# Patient Record
Sex: MALE | Race: WHITE | NOT HISPANIC OR LATINO | Employment: FULL TIME | URBAN - METROPOLITAN AREA
[De-identification: names, ages, dates, MRNs, and addresses within clinical notes are randomized per-mention and may not be internally consistent; named-entity substitution may affect disease eponyms.]

---

## 2017-05-05 ENCOUNTER — ALLSCRIPTS OFFICE VISIT (OUTPATIENT)
Dept: OTHER | Facility: OTHER | Age: 47
End: 2017-05-05

## 2017-12-19 ENCOUNTER — ALLSCRIPTS OFFICE VISIT (OUTPATIENT)
Dept: OTHER | Facility: OTHER | Age: 47
End: 2017-12-19

## 2017-12-20 NOTE — PROGRESS NOTES
Assessment  1  Umbilical hernia without obstruction and without gangrene (553 1) (G06 6)    Plan  Umbilical hernia without obstruction and without gangrene    · 1 - Ian Prado MD, Joyce Erazo  (General Surgery) Co-Management  *  Status: Active  Requestedfor: 69XOA7958  Care Summary provided  : Yes    Discussion/Summary    Discussed with patient that if he develops any pain or redness in the area he has to go the emergency room  The patient was counseled regarding diagnostic results,-- instructions for management,-- risk factor reductions,-- prognosis  Chief Complaint  Patient is here with c/o possible hernia      History of Present Illness  HPI: Patient states today concerned that he may have developed a hernia, he has noticed swelling around his umbilicus when he coughs  He denies any fevers chills, or pain in the area  Review of Systems   Constitutional: no fever or chills, feels well, no tiredness, no recent weight loss or weight gain  ENT: no complaints of earache, no loss of hearing, no nosebleeds or nasal discharge, no sore throat or hoarseness  Cardiovascular: no complaints of slow or fast heart rate, no chest pain, no palpitations, no leg claudication or lower extremity edema  Respiratory: no complaints of shortness of breath, no wheezing or cough, no dyspnea on exertion, no orthopnea or PND  Gastrointestinal: as noted in HPI  Genitourinary: no complaints of dysuria or incontinence, no hesitancy, no nocturia, no genital lesion, no inadequacy of penile erection  Musculoskeletal: no complaints of arthralgia, no myalgia, no joint swelling or stiffness, no limb pain or swelling  Active Problems  1  Abdominal pain, LLQ (left lower quadrant) (789 04) (R10 32)   2  Anxiety (300 00) (F41 9)   3  Back pain (724 5) (M54 9)   4  CAD in native artery (414 01) (I25 10)   5  Chest pain (786 50) (R07 9)   6  Conjunctivitis (372 30) (H10 9)   7  Dyspnea (786 09) (R06 00)   8  Fatigue (780 79) (R53 83)   9  Hyperlipemia (272 4) (E78 5)   10  Hypertension, benign (401 1) (I10)   11  Neuropathy of both upper extremities (356 9) (G56 93)   12  Palpitations (785 1) (R00 2)   13  RLS (restless legs syndrome) (333 94) (G25 81)   14  URI (upper respiratory infection) (465 9) (J06 9)    Past Medical History  1  History of Anxiety disorder (300 00) (F41 9)   2  History of Depression (311) (F32 9)   3  History of Hyperlipidemia (272 4) (E78 5)   4  History of Hypertension (401 9) (I10)   5  History of Obstructive sleep apnea (327 23) (G47 33)    Family History  Mother    1  Family history of coronary artery disease (V17 3) (Z82 49)   2  Family history of Hypertension (401 9) (I10)  Father    3  Family history of coronary artery disease (V17 3) (Z82 49)   4  Family history of Hypertension (401 9) (I10)  Brother    5  Family history of coronary artery disease (V17 3) (Z82 49)   6  Family history of Hypertension (401 9) (I10)    Social History   · Never smoker    Surgical History  1  History of Appendectomy   2  History of Knee Surgery    Current Meds   1  AmLODIPine Besylate 5 MG Oral Tablet; take 1 tablet by mouth once daily; Therapy: 62UIL5930 to (Evaluate:07Apr2018)  Requested for: 93GOW1984; Last Rx:59Ioe3698 Ordered   2  Aspirin 81 MG TABS; TAKE 1 TABLET DAILY; Therapy: 29SQR3473 to (Evaluate:19Mar2015); Last Rx:27Xfe1709 Ordered   3  Atorvastatin Calcium 40 MG Oral Tablet; take 1 tablet by mouth once daily; Therapy: 49DXE8563 to (Evaluate:07Apr2018)  Requested for: 09Oct2017; Last Rx:09Oct2017 Ordered   4  Azithromycin 250 MG Oral Tablet; TAKE 2 TABLETS ON DAY 1 THEN TAKE 1 TABLET A DAY FOR 4 DAYS; Therapy: 56FMH6357 to (Last Rx:65Smi7389)  Requested for: 40Zqf4305 Ordered   5  Ciprofloxacin HCl - 500 MG Oral Tablet; 1 bd; Therapy: 47PMQ3368 to (Last FA:08MJZ4674)  Requested for: 05ATS1681 Ordered   6  Metaxalone 400 MG Oral Tablet; TAKE 1 TABLET 3 TIMES DAILY as needed for back pain;  Therapy: 28XSD1491 to (Last Rx: 90ETK5045)  Requested for: 29AVO1252 Ordered   7  MetroNIDAZOLE 500 MG Oral Tablet; 1 tid; Therapy: 90TFL4234 to (Last YJ:54KCL7169)  Requested for: 19VKJ3451 Ordered   8  Venlafaxine HCl ER 75 MG Oral Capsule Extended Release 24 Hour; take 1 capsule by mouth daily; Therapy: 74AZD8847 to (Evaluate:02Jun2018)  Requested for: 20JQT1329; Last Rx:67Jtx6348 Ordered    Allergies  1  No Known Drug Allergies    Vitals   Recorded: 64JHC4754 11:55AM   Heart Rate 195   Systolic 616   Diastolic 78   Height 5 ft 8 in   Weight 228 lb    BMI Calculated 34 67   BSA Calculated 2 16   O2 Saturation 96     Physical Exam   Constitutional  General appearance: No acute distress, well appearing and well nourished  Ears, Nose, Mouth, and Throat  Oropharynx: Normal with no erythema, edema, exudate or lesions  Pulmonary  Auscultation of lungs: Clear to auscultation, equal breath sounds bilaterally, no wheezes, no rales, no rhonci  Cardiovascular  Auscultation of heart: Normal rate and rhythm, normal S1 and S2, without murmurs  Examination of extremities for edema and/or varicosities: Normal    Abdomen  Abdomen: Abnormal  -- reducible umbilical hernia          Signatures   Electronically signed by : Olayinka Freedman DO; Dec 19 2017 12:15PM EST                       (Author)

## 2017-12-26 ENCOUNTER — ALLSCRIPTS OFFICE VISIT (OUTPATIENT)
Dept: OTHER | Facility: OTHER | Age: 47
End: 2017-12-26

## 2017-12-26 DIAGNOSIS — K42.0 UMBILICAL HERNIA WITH OBSTRUCTION, WITHOUT GANGRENE: ICD-10-CM

## 2017-12-27 NOTE — CONSULTS
Assessment   1  Incarcerated umbilical hernia (881 8) (K42 0)    Plan   Incarcerated umbilical hernia    · Schedule Surgery Treatment  Procedure  Status: Hold For - Scheduling  Requested for:    17SSO4285   Ordered; For: Incarcerated umbilical hernia; Ordered By: Tyson De Jesus Performed:  Due: 59KWW7234   · (1) [place]; Status:Active; Requested for:60Ixq9125;    Perform:[facility]; Due:25Sbt6514; Ordered; For:Incarcerated umbilical hernia; Ordered By:Lebron Kumar;   · (1) CBC/ PLT (NO DIFF); Status:Active; Requested for:69Fws6192;    Perform:[facility]; Due:40Qmv1635; Ordered; For:Incarcerated umbilical hernia; Ordered By:Lebron Kumar;    Discussion/Summary   Discussion Summary:    38-year-old male with a past medical history significant for hypertension, hyperlipidemia, morbid obesity, who has an incarcerated umbilical hernia  I advised the patient to undergo laparoscopic umbilical hernia repair with mesh in the near future  I discussed the operative procedure, risks, benefits and alternatives with the patient, he understood and agreed to proceed  Medication SE Review and Pt Understands Tx: The treatment plan was reviewed with the patient/guardian  The patient/guardian understands and agrees with the treatment plan      Chief Complaint   Chief Complaint Free Text Note Form: Consult umbilical hernia      History of Present Illness   HPI: I had the pleasure of seeing Alok Morris in the office today accompanied by his wife for evaluation of umbilical hernia  The patient is a pleasant 38-year-old male started complaining of moderate pain from the bulging umbilicus the last couple weeks because of coughing spell secondary to a bad cold  He also noted that the bulge has been increasing in size  He noted the bulge for many years  He did not seek any medical attention until now  Review of Systems   Complete-Male:      Constitutional: no fever-- and-- no chills  Eyes: no eye pain-- and-- no purulent discharge from the eyes  ENT: no earache,-- no nosebleeds-- and-- no sore throat  Cardiovascular: no chest pain-- and-- no palpitations  Respiratory: no shortness of breath,-- no cough-- and-- no wheezing  Gastrointestinal: no abdominal pain,-- no nausea,-- no vomiting,-- no constipation-- and-- no diarrhea  Genitourinary: no dysuria-- and-- no incontinence  Musculoskeletal: no arthralgias-- and-- no myalgias  Integumentary: no rashes-- and-- no skin lesions  Neurological: no headache-- and-- no convulsions  Psychiatric: no anxiety-- and-- no depression  Hematologic/Lymphatic: no swollen glands-- and-- no tendency for easy bleeding  ROS Reviewed:    ROS reviewed  Active Problems   1  Abdominal pain, LLQ (left lower quadrant) (789 04) (R10 32)   2  Anxiety (300 00) (F41 9)   3  Back pain (724 5) (M54 9)   4  CAD in native artery (414 01) (I25 10)   5  Chest pain (786 50) (R07 9)   6  Conjunctivitis (372 30) (H10 9)   7  Dyspnea (786 09) (R06 00)   8  Fatigue (780 79) (R53 83)   9  Hyperlipemia (272 4) (E78 5)   10  Hypertension, benign (401 1) (I10)   11  Neuropathy of both upper extremities (356 9) (G56 93)   12  Palpitations (785 1) (R00 2)   13  RLS (restless legs syndrome) (333 94) (G25 81)   14  Umbilical hernia without obstruction and without gangrene (553 1) (K42 9)   15  URI (upper respiratory infection) (465 9) (J06 9)    Past Medical History   1  History of Anxiety disorder (300 00) (F41 9)   2  History of Depression (311) (F32 9)   3  History of Hyperlipidemia (272 4) (E78 5)   4  History of Hypertension (401 9) (I10)   5  History of Obstructive sleep apnea (327 23) (G47 33)  Active Problems And Past Medical History Reviewed: The active problems and past medical history were reviewed and updated today  Surgical History   1  History of Appendectomy   2   History of Knee Surgery  Surgical History Reviewed: The surgical history was reviewed and updated today  Family History   Mother    1  Family history of coronary artery disease (V17 3) (Z82 49)   2  Family history of Hypertension (401 9) (I10)  Father    3  Family history of coronary artery disease (V17 3) (Z82 49)   4  Family history of Hypertension (401 9) (I10)  Brother    5  Family history of coronary artery disease (V17 3) (Z82 49)   6  Family history of Hypertension (401 9) (I10)  Family History Reviewed: The family history was reviewed and updated today  Social History    · Never smoker  Social History Reviewed: The social history was reviewed and updated today  The social history was reviewed and is unchanged  Current Meds    1  AmLODIPine Besylate 5 MG Oral Tablet; take 1 tablet by mouth once daily; Therapy: 57SZS9865 to (Evaluate:07Apr2018)  Requested for: 79QXY1598; Last     Rx:52Xkl3514 Ordered   2  Aspirin 81 MG TABS; TAKE 1 TABLET DAILY; Therapy: 74TIL8661 to (Evaluate:19Mar2015); Last Rx:22Pjw2767 Ordered   3  Atorvastatin Calcium 40 MG Oral Tablet; take 1 tablet by mouth once daily; Therapy: 78BNY5125 to (Evaluate:07Apr2018)  Requested for: 09Oct2017; Last     Rx:73Wxw2481 Ordered   4  Azithromycin 250 MG Oral Tablet; TAKE 2 TABLETS ON DAY 1 THEN TAKE 1 TABLET A     DAY FOR 4 DAYS; Therapy: 75ALU3164 to (Last Rx:64Ynj6968)  Requested for: 82Jos7425 Ordered   5  Ciprofloxacin HCl - 500 MG Oral Tablet; 1 bd; Therapy: 12BFM9364 to (Last AE:56LKM5318)  Requested for: 03MQO2798 Ordered   6  Metaxalone 400 MG Oral Tablet; TAKE 1 TABLET 3 TIMES DAILY as needed for back pain; Therapy: 40UNS5953 to (Last Rx:42Hxb7777)  Requested for: 64Cod7937 Ordered   7  MetroNIDAZOLE 500 MG Oral Tablet; 1 tid; Therapy: 32RUI9020 to (Last LJ:43THH6602)  Requested for: 02UCW6609 Ordered   8  Venlafaxine HCl ER 75 MG Oral Capsule Extended Release 24 Hour; take 1 capsule by     mouth daily;      Therapy: 26SHE3241 to (ULKHVURO:99DBA9288)  Requested for: 09OJU1984; Last     Rx:53Rgn7668 Ordered  Medication List Reviewed: The medication list was reviewed and updated today  Allergies   1  No Known Drug Allergies    Vitals   Vital Signs    Recorded: 82RWV8044 11:28AM   Temperature 11 7 F, Oral   Systolic 635, RUE, Sitting   Diastolic 96, RUE, Sitting   Height 5 ft 8 in   Weight 228 lb 2 oz   BMI Calculated 34 69   BSA Calculated 2 16     Physical Exam        Constitutional      General appearance: No acute distress, well appearing and well nourished  Eyes      Conjunctiva and lids: No swelling, erythema, or discharge  Pupils and irises: Equal, round and reactive to light  Sclera non-icteric  Ears, Nose, Mouth, and Throat      External inspection of ears and nose: Normal        Oropharynx: Normal with no erythema, edema, exudate or lesions  Neck      Supple, symmetric, trachea midline, no masses      Pulmonary      Respiratory effort: No increased work of breathing or signs of respiratory distress  Auscultation of lungs: Clear to auscultation, equal breath sounds bilaterally, no wheezes, no rales, no rhonci  Cardiovascular      Auscultation of heart: Normal rate and rhythm, normal S1 and S2, without murmurs  Abdomen      Abdomen: Non-tender, no masses  Liver and spleen: No hepatomegaly or splenomegaly  -- There is an incarcerated umbilical hernia with a defect measured approximately 2 cm  Lymphatic      Palpation of lymph nodes in neck: No lymphadenopathy  Skin      Skin and subcutaneous tissue: Normal without rashes or lesions  Neurologic      Cranial nerves: Cranial nerves 2-12 intact  Sensation: Motor and sensory grossly intact         Psychiatric      Orientation to person, place and time: Normal        Mood and affect: Normal        Signatures    Electronically signed by : Jean-Paul Carter MD; Dec 26 2017 11:46AM EST                       (Author)

## 2018-01-04 RX ORDER — AMLODIPINE BESYLATE 5 MG/1
5 TABLET ORAL DAILY
COMMUNITY
Start: 2015-03-11 | End: 2018-05-02 | Stop reason: SDUPTHER

## 2018-01-04 RX ORDER — VENLAFAXINE HYDROCHLORIDE 75 MG/1
75 CAPSULE, EXTENDED RELEASE ORAL DAILY
COMMUNITY
Start: 2015-06-15 | End: 2018-06-02 | Stop reason: SDUPTHER

## 2018-01-04 RX ORDER — ATORVASTATIN CALCIUM 40 MG/1
40 TABLET, FILM COATED ORAL DAILY
COMMUNITY
Start: 2015-03-11 | End: 2018-05-02 | Stop reason: SDUPTHER

## 2018-01-04 NOTE — PRE-PROCEDURE INSTRUCTIONS
Pre-Surgery Instructions:   Medication Instructions    amLODIPine (NORVASC) 5 mg tablet Patient was instructed by Physician and understands   aspirin 81 MG tablet Patient was instructed by Physician and understands   atorvastatin (LIPITOR) 40 mg tablet Patient was instructed by Physician and understands   venlafaxine (EFFEXOR-XR) 75 mg 24 hr capsule Patient was instructed by Physician and understands

## 2018-01-08 ENCOUNTER — TRANSCRIBE ORDERS (OUTPATIENT)
Dept: ADMINISTRATIVE | Facility: HOSPITAL | Age: 48
End: 2018-01-08

## 2018-01-08 ENCOUNTER — APPOINTMENT (OUTPATIENT)
Dept: LAB | Facility: HOSPITAL | Age: 48
End: 2018-01-08
Attending: SURGERY
Payer: COMMERCIAL

## 2018-01-08 DIAGNOSIS — K42.0 UMBILICAL HERNIA WITH OBSTRUCTION, WITHOUT GANGRENE: ICD-10-CM

## 2018-01-08 LAB
ANION GAP SERPL CALCULATED.3IONS-SCNC: 7 MMOL/L (ref 4–13)
BUN SERPL-MCNC: 15 MG/DL (ref 5–25)
CALCIUM SERPL-MCNC: 8.9 MG/DL (ref 8.3–10.1)
CHLORIDE SERPL-SCNC: 104 MMOL/L (ref 100–108)
CO2 SERPL-SCNC: 27 MMOL/L (ref 21–32)
CREAT SERPL-MCNC: 1.09 MG/DL (ref 0.6–1.3)
ERYTHROCYTE [DISTWIDTH] IN BLOOD BY AUTOMATED COUNT: 12.6 % (ref 11.6–15.1)
GFR SERPL CREATININE-BSD FRML MDRD: 79 ML/MIN/1.73SQ M
GLUCOSE SERPL-MCNC: 110 MG/DL (ref 65–140)
HCT VFR BLD AUTO: 42.5 % (ref 36.5–49.3)
HGB BLD-MCNC: 14 G/DL (ref 12–17)
MCH RBC QN AUTO: 27.3 PG (ref 26.8–34.3)
MCHC RBC AUTO-ENTMCNC: 32.9 G/DL (ref 31.4–37.4)
MCV RBC AUTO: 83 FL (ref 82–98)
PLATELET # BLD AUTO: 235 THOUSANDS/UL (ref 149–390)
PMV BLD AUTO: 9.1 FL (ref 8.9–12.7)
POTASSIUM SERPL-SCNC: 4 MMOL/L (ref 3.5–5.3)
RBC # BLD AUTO: 5.13 MILLION/UL (ref 3.88–5.62)
SODIUM SERPL-SCNC: 138 MMOL/L (ref 136–145)
WBC # BLD AUTO: 5.55 THOUSAND/UL (ref 4.31–10.16)

## 2018-01-08 PROCEDURE — 80048 BASIC METABOLIC PNL TOTAL CA: CPT

## 2018-01-08 PROCEDURE — 36415 COLL VENOUS BLD VENIPUNCTURE: CPT

## 2018-01-08 PROCEDURE — 85027 COMPLETE CBC AUTOMATED: CPT

## 2018-01-09 ENCOUNTER — ANESTHESIA EVENT (OUTPATIENT)
Dept: PERIOP | Facility: HOSPITAL | Age: 48
End: 2018-01-09
Payer: COMMERCIAL

## 2018-01-09 NOTE — ANESTHESIA PREPROCEDURE EVALUATION
Review of Systems/Medical History  Patient summary reviewed  Chart reviewed  No history of anesthetic complications     Cardiovascular  Exercise tolerance: good,  Hyperlipidemia, Hypertension ,    Pulmonary  Sleep apnea CPAP, ,   Comment: Flu 3 weeks ago, all sx resolved     GI/Hepatic    GERD (no meds) ,        Negative  ROS        Endo/Other  Negative endo/other ROS      GYN       Hematology  Negative hematology ROS      Musculoskeletal  Obesity ,        Neurology      Comment: Restless leg syndrome  Peripheral neuropathy BL upper extremities Psychology   Negative psychology ROS        NPO before MN, sips of water with effexor this AM  Physical Exam    Airway    Mallampati score: III  TM Distance: >3 FB  Neck ROM: full     Dental   No notable dental hx     Cardiovascular  Cardiovascular exam normal    Pulmonary  Pulmonary exam normal     Other Findings        Anesthesia Plan  ASA Score- 2     Anesthesia Type- general with ASA Monitors  Additional Monitors:   Airway Plan: ETT  Plan Factors-    Induction- intravenous  Postoperative Plan-     Informed Consent- Anesthetic plan and risks discussed with patient

## 2018-01-10 ENCOUNTER — HOSPITAL ENCOUNTER (OUTPATIENT)
Facility: HOSPITAL | Age: 48
Setting detail: OUTPATIENT SURGERY
Discharge: HOME | End: 2018-01-10
Attending: SURGERY | Admitting: SURGERY
Payer: COMMERCIAL

## 2018-01-10 ENCOUNTER — ANESTHESIA (OUTPATIENT)
Dept: PERIOP | Facility: HOSPITAL | Age: 48
End: 2018-01-10
Payer: COMMERCIAL

## 2018-01-10 VITALS
OXYGEN SATURATION: 96 % | WEIGHT: 232 LBS | RESPIRATION RATE: 17 BRPM | SYSTOLIC BLOOD PRESSURE: 119 MMHG | DIASTOLIC BLOOD PRESSURE: 62 MMHG | BODY MASS INDEX: 34.36 KG/M2 | HEIGHT: 69 IN | HEART RATE: 65 BPM | TEMPERATURE: 97.5 F

## 2018-01-10 PROBLEM — K42.0 INCARCERATED UMBILICAL HERNIA: Chronic | Status: ACTIVE | Noted: 2018-01-10

## 2018-01-10 PROCEDURE — C1781 MESH (IMPLANTABLE): HCPCS | Performed by: SURGERY

## 2018-01-10 DEVICE — VENTRALIGHT ST MESH
Type: IMPLANTABLE DEVICE | Site: UMBILICAL | Status: FUNCTIONAL
Brand: VENTRALIGHT ST

## 2018-01-10 RX ORDER — ACETAMINOPHEN AND CODEINE PHOSPHATE 300; 30 MG/1; MG/1
1 TABLET ORAL EVERY 4 HOURS PRN
Qty: 20 TABLET | Refills: 0 | Status: SHIPPED | OUTPATIENT
Start: 2018-01-10 | End: 2018-01-20

## 2018-01-10 RX ORDER — MAGNESIUM HYDROXIDE 1200 MG/15ML
LIQUID ORAL AS NEEDED
Status: DISCONTINUED | OUTPATIENT
Start: 2018-01-10 | End: 2018-01-10 | Stop reason: HOSPADM

## 2018-01-10 RX ORDER — ONDANSETRON 2 MG/ML
INJECTION INTRAMUSCULAR; INTRAVENOUS AS NEEDED
Status: DISCONTINUED | OUTPATIENT
Start: 2018-01-10 | End: 2018-01-10 | Stop reason: SURG

## 2018-01-10 RX ORDER — ROCURONIUM BROMIDE 10 MG/ML
INJECTION, SOLUTION INTRAVENOUS AS NEEDED
Status: DISCONTINUED | OUTPATIENT
Start: 2018-01-10 | End: 2018-01-10 | Stop reason: SURG

## 2018-01-10 RX ORDER — METOCLOPRAMIDE HYDROCHLORIDE 5 MG/ML
10 INJECTION INTRAMUSCULAR; INTRAVENOUS ONCE AS NEEDED
Status: DISCONTINUED | OUTPATIENT
Start: 2018-01-10 | End: 2018-01-10 | Stop reason: HOSPADM

## 2018-01-10 RX ORDER — BUPIVACAINE HYDROCHLORIDE 2.5 MG/ML
INJECTION, SOLUTION EPIDURAL; INFILTRATION; INTRACAUDAL AS NEEDED
Status: DISCONTINUED | OUTPATIENT
Start: 2018-01-10 | End: 2018-01-10 | Stop reason: HOSPADM

## 2018-01-10 RX ORDER — KETOROLAC TROMETHAMINE 30 MG/ML
INJECTION, SOLUTION INTRAMUSCULAR; INTRAVENOUS AS NEEDED
Status: DISCONTINUED | OUTPATIENT
Start: 2018-01-10 | End: 2018-01-10 | Stop reason: SURG

## 2018-01-10 RX ORDER — DIPHENHYDRAMINE HYDROCHLORIDE 50 MG/ML
12.5 INJECTION INTRAMUSCULAR; INTRAVENOUS ONCE AS NEEDED
Status: DISCONTINUED | OUTPATIENT
Start: 2018-01-10 | End: 2018-01-10 | Stop reason: HOSPADM

## 2018-01-10 RX ORDER — SODIUM CHLORIDE, SODIUM LACTATE, POTASSIUM CHLORIDE, CALCIUM CHLORIDE 600; 310; 30; 20 MG/100ML; MG/100ML; MG/100ML; MG/100ML
INJECTION, SOLUTION INTRAVENOUS CONTINUOUS PRN
Status: DISCONTINUED | OUTPATIENT
Start: 2018-01-10 | End: 2018-01-10 | Stop reason: SURG

## 2018-01-10 RX ORDER — FENTANYL CITRATE/PF 50 MCG/ML
50 SYRINGE (ML) INJECTION
Status: COMPLETED | OUTPATIENT
Start: 2018-01-10 | End: 2018-01-10

## 2018-01-10 RX ORDER — MORPHINE SULFATE 2 MG/ML
2 INJECTION, SOLUTION INTRAMUSCULAR; INTRAVENOUS EVERY 2 HOUR PRN
Status: DISCONTINUED | OUTPATIENT
Start: 2018-01-10 | End: 2018-01-10 | Stop reason: HOSPADM

## 2018-01-10 RX ORDER — FENTANYL CITRATE 50 UG/ML
INJECTION, SOLUTION INTRAMUSCULAR; INTRAVENOUS AS NEEDED
Status: DISCONTINUED | OUTPATIENT
Start: 2018-01-10 | End: 2018-01-10 | Stop reason: SURG

## 2018-01-10 RX ORDER — ONDANSETRON 2 MG/ML
4 INJECTION INTRAMUSCULAR; INTRAVENOUS EVERY 6 HOURS PRN
Status: DISCONTINUED | OUTPATIENT
Start: 2018-01-10 | End: 2018-01-10 | Stop reason: HOSPADM

## 2018-01-10 RX ORDER — PROPOFOL 10 MG/ML
INJECTION, EMULSION INTRAVENOUS AS NEEDED
Status: DISCONTINUED | OUTPATIENT
Start: 2018-01-10 | End: 2018-01-10 | Stop reason: SURG

## 2018-01-10 RX ORDER — OXYCODONE HYDROCHLORIDE AND ACETAMINOPHEN 5; 325 MG/1; MG/1
1 TABLET ORAL EVERY 4 HOURS PRN
Status: DISCONTINUED | OUTPATIENT
Start: 2018-01-10 | End: 2018-01-10 | Stop reason: HOSPADM

## 2018-01-10 RX ORDER — MIDAZOLAM HYDROCHLORIDE 1 MG/ML
INJECTION INTRAMUSCULAR; INTRAVENOUS AS NEEDED
Status: DISCONTINUED | OUTPATIENT
Start: 2018-01-10 | End: 2018-01-10 | Stop reason: SURG

## 2018-01-10 RX ORDER — GLYCOPYRROLATE 0.2 MG/ML
INJECTION INTRAMUSCULAR; INTRAVENOUS AS NEEDED
Status: DISCONTINUED | OUTPATIENT
Start: 2018-01-10 | End: 2018-01-10 | Stop reason: SURG

## 2018-01-10 RX ORDER — ONDANSETRON 2 MG/ML
4 INJECTION INTRAMUSCULAR; INTRAVENOUS ONCE AS NEEDED
Status: DISCONTINUED | OUTPATIENT
Start: 2018-01-10 | End: 2018-01-10 | Stop reason: HOSPADM

## 2018-01-10 RX ORDER — EPHEDRINE SULFATE 50 MG/ML
INJECTION, SOLUTION INTRAVENOUS AS NEEDED
Status: DISCONTINUED | OUTPATIENT
Start: 2018-01-10 | End: 2018-01-10 | Stop reason: SURG

## 2018-01-10 RX ADMIN — DEXAMETHASONE SODIUM PHOSPHATE 4 MG: 10 INJECTION INTRAMUSCULAR; INTRAVENOUS at 08:15

## 2018-01-10 RX ADMIN — OXYCODONE HYDROCHLORIDE AND ACETAMINOPHEN 1 TABLET: 5; 325 TABLET ORAL at 10:13

## 2018-01-10 RX ADMIN — FENTANYL CITRATE 50 MCG: 50 INJECTION INTRAMUSCULAR; INTRAVENOUS at 09:15

## 2018-01-10 RX ADMIN — KETOROLAC TROMETHAMINE 30 MG: 30 INJECTION, SOLUTION INTRAMUSCULAR at 08:41

## 2018-01-10 RX ADMIN — ROCURONIUM BROMIDE 40 MG: 10 INJECTION INTRAVENOUS at 08:00

## 2018-01-10 RX ADMIN — ENOXAPARIN SODIUM 40 MG: 40 INJECTION SUBCUTANEOUS at 08:07

## 2018-01-10 RX ADMIN — PROPOFOL 200 MG: 10 INJECTION, EMULSION INTRAVENOUS at 08:00

## 2018-01-10 RX ADMIN — ONDANSETRON 4 MG: 2 INJECTION INTRAMUSCULAR; INTRAVENOUS at 08:35

## 2018-01-10 RX ADMIN — MIDAZOLAM 2 MG: 1 INJECTION INTRAMUSCULAR; INTRAVENOUS at 07:55

## 2018-01-10 RX ADMIN — EPHEDRINE SULFATE 5 MG: 50 INJECTION, SOLUTION INTRAMUSCULAR; INTRAVENOUS; SUBCUTANEOUS at 08:36

## 2018-01-10 RX ADMIN — LIDOCAINE HYDROCHLORIDE 100 MG: 20 INJECTION, SOLUTION INTRAVENOUS at 08:00

## 2018-01-10 RX ADMIN — FENTANYL CITRATE 50 MCG: 50 INJECTION INTRAMUSCULAR; INTRAVENOUS at 09:24

## 2018-01-10 RX ADMIN — HYDROMORPHONE HYDROCHLORIDE 0.5 MG: 1 INJECTION, SOLUTION INTRAMUSCULAR; INTRAVENOUS; SUBCUTANEOUS at 08:14

## 2018-01-10 RX ADMIN — FENTANYL CITRATE 100 MCG: 50 INJECTION, SOLUTION INTRAMUSCULAR; INTRAVENOUS at 08:00

## 2018-01-10 RX ADMIN — CEFAZOLIN SODIUM 2000 MG: 2 SOLUTION INTRAVENOUS at 08:09

## 2018-01-10 RX ADMIN — SODIUM CHLORIDE, SODIUM LACTATE, POTASSIUM CHLORIDE, AND CALCIUM CHLORIDE: .6; .31; .03; .02 INJECTION, SOLUTION INTRAVENOUS at 07:35

## 2018-01-10 RX ADMIN — EPHEDRINE SULFATE 5 MG: 50 INJECTION, SOLUTION INTRAMUSCULAR; INTRAVENOUS; SUBCUTANEOUS at 08:34

## 2018-01-10 RX ADMIN — PROPOFOL 100 MG: 10 INJECTION, EMULSION INTRAVENOUS at 08:02

## 2018-01-10 RX ADMIN — NEOSTIGMINE METHYLSULFATE 3 MG: 1 INJECTION, SOLUTION INTRAMUSCULAR; INTRAVENOUS; SUBCUTANEOUS at 08:40

## 2018-01-10 RX ADMIN — GLYCOPYRROLATE 0.4 MG: 0.2 INJECTION, SOLUTION INTRAMUSCULAR; INTRAVENOUS at 08:40

## 2018-01-10 NOTE — OP NOTE
OPERATIVE REPORT  PATIENT NAME: Eda Glass    :  1970  MRN: [id number]  Pt Location: [facility] OR ROOM 04    SURGERY DATE: 1/10/2018    Surgeon(s) and Role:     Ke Mondragon MD - Primary    Preop Diagnosis:  Incarcerated umbilical hernia [E64 9]    Post-Op Diagnosis Codes:     * Incarcerated umbilical hernia [D45 8]    Procedure(s) (LRB):  LAPAROSCOPIC UMBILICAL HERNIA REPAIR WITH MESH (N/A)    Specimen(s):  * No specimens in log *    Estimated Blood Loss:   Minimal    Drains:       Anesthesia Type:   General    Operative Indications:  Incarcerated umbilical hernia [E48 2]  [de-identified] year male presents to my office complaining of a painful bulge in the umbilicus  On examination the patient had an incarcerated umbilical hernia  The patient was advised to undergo laparoscopic umbilical hernia repair with mesh  Operative Findings:  The patient had an incarceration of omentum within the hernia sac and preperitoneal fat  The defect measured approximately 2 5 cm  The rest of the abdominal cavity showed no evidence of inflammatory neoplastic process  Complications:   None    Procedure and Technique:  The patient was identified and then he was taken to the operating room and placed in the operating table in a supine position  After adequate anesthesia induction and satisfactory endotracheal intubation the abdomen was prepped and draped under sterile usual fashion with Chloraprep  Timeout was called the patient was identified as well as the surgical site  The abdominal wall was elevated with towel clips, the Veress needle was introduced through the umbilicus after the omentum was reduced  The abdomen was insufflated with C02  After obtaining adequate pneumoperitoneum 5 mm trocar was placed on the left upper quadrant and the scope was advanced  Exploration of the abdomen was performed with the above findings  11 mm trocar was placed on the left lower quadrant under direct vision   5 mm trocar was placed on the left side of abdomen under direct vision  At this point I proceeded to dissect the hernia sac with the hook and cautery  11 cm circular ventral light mesh was placed and tacked to the anterior abdominal wall with 0 Nurolon using transfascial U stitch fashion at the 6 and 12:00 position  The rest of the mesh edge was tacked up to the anterior abdominal wall with SorbaFix  The ports were removed under direct vision without evidence of bleeding from the abdominal wall  The subcutaneous tissue was infiltrated with 0 25% of Marcaine and the skin was closed with 4-0 Vicryl in a continuous subcuticular fashion  Sterile dressings were applied  At the end of the case instrument, needles and sponges counts were correct  The patient tolerated the procedure well and then he was transferred to recovery room in a stable conditions  The physician assistant was crucial for the entrance into the abdominal cavity, dissection of the hernia sac, placement of a mesh and closure of the abdominal wall     I was present for the entire procedure and A qualified resident physician was not available    Patient Disposition:  PACU     SIGNATURE: Alba Cardoso MD  DATE: January 10, 2018  TIME: 8:39 AM

## 2018-01-10 NOTE — ANESTHESIA POSTPROCEDURE EVALUATION
Post-Op Assessment Note      CV Status:  Stable    Mental Status:  Awake    Hydration Status:  Stable    PONV Controlled:  None    Airway Patency:  Patent    Post Op Vitals Reviewed: Yes          Staff: CRNA           BP   119/67   Temp  98 3   Pulse  69   Resp   17   SpO2   99% on 6LFM   Postop VS in PACU noted above, SV non-obstructed

## 2018-01-10 NOTE — DISCHARGE INSTRUCTIONS
Umbilical Hernia   WHAT YOU NEED TO KNOW:   An umbilical hernia is a bulge through the abdominal muscles in the area of the navel (belly button)  The hernia may contain tissue from the abdomen, part of an organ (such as the intestine), or fluid  Umbilical hernias usually happen because of a hole or a weak area in the muscles of the abdominal wall  DISCHARGE INSTRUCTIONS:   Medicines:   · Ibuprofen or acetaminophen:  These medicines decrease pain  They are available without a doctor's order  Ask your healthcare provider which medicine is right for you  Ask how much to take and how often to take it  Follow directions  These medicines can cause stomach bleeding if not taken correctly  Ibuprofen can cause kidney damage  Do not take ibuprofen if you have kidney disease, an ulcer, or allergies to aspirin  Acetaminophen can cause liver damage  Do not drink alcohol if you take acetaminophen  · Take your medicine as directed  Contact your healthcare provider if you think your medicine is not helping or if you have side effects  Tell him or her if you are allergic to any medicine  Keep a list of the medicines, vitamins, and herbs you take  Include the amounts, and when and why you take them  Bring the list or the pill bottles to follow-up visits  Carry your medicine list with you in case of an emergency  Follow up with your healthcare provider as directed:  Write down your questions so you remember to ask them during your visits  Self care:   · Activity:  Avoid lifting, bending, and straining  Try not to stand for long periods of time  If you have to cough, try to cough gently  Support the hernia by holding your hand or a pillow over it when coughing  Ask your if it is okay for you to have sexual intercourse  · Prevent constipation:  Straining to have a bowel movement may make your hernia worse  Eat foods that are high in fiber and drink at least 8 glasses of water a day   A high-fiber diet includes whole grains, bran, cereals, and uncooked fruit and vegetables  Walking or other exercise can also help  Your healthcare provider may give you fiber medicine or a stool softener to help make your bowel movements softer and more regular  Do not use an enema or a laxative unless your healthcare provider says it is okay  · What to wear:  Do not wear anything tight over your hernia  Ask your healthcare provider if you should wear a support belt or girdle to keep the hernia in place  · Ask your healthcare provider how to reduce your hernia:  Sometimes your hernia will slip back into your abdomen if you lie flat for a while  Ask your healthcare provider if you should try to gently push your hernia back into place if lying flat does not work  If your hernia does not slide back into your abdomen easily, stop pushing on it and call your healthcare provider  Do not try to push the hernia back into place if it is painful or tender  Contact your healthcare provider if:   · You have nausea or vomiting  · You cannot gently push your hernia back into your abdomen  (Do this only if your healthcare provider has shown you how to do it )     · You are constipated or have blood in your bowel movements  · Your hernia is getting bigger  · You have questions or concerns about your condition or care  Seek care immediately or call 911 if:   · You have a fever  · Your hernia is stuck outside your abdomen and is painful, swollen, or feels hard  · You completely stop having bowel movements and stop passing gas  · Your abdominal pain is bad or getting worse  © 2017 [employer] Information is for End User's use only and may not be sold, redistributed or otherwise used for commercial purposes  All illustrations and images included in CareNotes® are the copyrighted property of [employer] , [employer]  or Erick [employer]fer  The above information is an  only   It is not intended as medical advice for individual conditions or treatments  Talk to your doctor, nurse or pharmacist before following any medical regimen to see if it is safe and effective for you    No diet restriction for this surgery  May shower every day  Remove dressings in 3 days  Remove strips in 7 days  Call office to make an appointment in 2 weeks  Call office with any issues regarding the surgery  No driving, heavy lifting or strenuous exercise for one week  Resume home medications  Apply ice to the incisions  May take Tylenol 3, regular Tylenol or ibuprofen for pain

## 2018-01-10 NOTE — PROGRESS NOTES
After ambulating to BR pt had scant amount bloody drainage note from upper midline puncture site around steri strip  Minimal pressure applied and oozing noted to have stopped  DSD applied to this site for ride home   Pt instructed he can removed afterwards

## 2018-01-13 VITALS
WEIGHT: 224 LBS | HEIGHT: 68 IN | OXYGEN SATURATION: 94 % | TEMPERATURE: 99 F | DIASTOLIC BLOOD PRESSURE: 84 MMHG | BODY MASS INDEX: 33.95 KG/M2 | HEART RATE: 100 BPM | SYSTOLIC BLOOD PRESSURE: 134 MMHG

## 2018-01-22 VITALS
SYSTOLIC BLOOD PRESSURE: 128 MMHG | BODY MASS INDEX: 34.56 KG/M2 | HEIGHT: 68 IN | HEART RATE: 108 BPM | DIASTOLIC BLOOD PRESSURE: 78 MMHG | WEIGHT: 228 LBS | OXYGEN SATURATION: 96 %

## 2018-01-23 VITALS
WEIGHT: 228.13 LBS | DIASTOLIC BLOOD PRESSURE: 96 MMHG | BODY MASS INDEX: 34.58 KG/M2 | HEIGHT: 68 IN | TEMPERATURE: 97.8 F | SYSTOLIC BLOOD PRESSURE: 124 MMHG

## 2018-05-02 DIAGNOSIS — I10 ESSENTIAL HYPERTENSION: Primary | ICD-10-CM

## 2018-05-02 DIAGNOSIS — E78.2 MIXED HYPERLIPIDEMIA: ICD-10-CM

## 2018-05-02 RX ORDER — AMLODIPINE BESYLATE 5 MG/1
TABLET ORAL
Qty: 30 TABLET | Refills: 5 | Status: SHIPPED | OUTPATIENT
Start: 2018-05-02 | End: 2018-11-19 | Stop reason: SDUPTHER

## 2018-05-02 RX ORDER — ATORVASTATIN CALCIUM 40 MG/1
TABLET, FILM COATED ORAL
Qty: 30 TABLET | Refills: 5 | Status: SHIPPED | OUTPATIENT
Start: 2018-05-02 | End: 2018-11-02 | Stop reason: SDUPTHER

## 2018-06-02 DIAGNOSIS — F41.9 ANXIETY: Primary | ICD-10-CM

## 2018-06-04 RX ORDER — VENLAFAXINE HYDROCHLORIDE 75 MG/1
CAPSULE, EXTENDED RELEASE ORAL
Qty: 30 CAPSULE | Refills: 5 | Status: SHIPPED | OUTPATIENT
Start: 2018-06-04 | End: 2018-11-19 | Stop reason: SDUPTHER

## 2018-06-13 ENCOUNTER — TELEPHONE (OUTPATIENT)
Dept: FAMILY MEDICINE CLINIC | Facility: CLINIC | Age: 48
End: 2018-06-13

## 2018-06-13 DIAGNOSIS — H10.32 ACUTE BACTERIAL CONJUNCTIVITIS OF LEFT EYE: Primary | ICD-10-CM

## 2018-06-13 RX ORDER — TOBRAMYCIN 3 MG/ML
1 SOLUTION/ DROPS OPHTHALMIC
Qty: 5 ML | Refills: 0 | Status: SHIPPED | OUTPATIENT
Start: 2018-06-13 | End: 2018-06-18

## 2018-06-13 NOTE — TELEPHONE ENCOUNTER
Patient called asking if you can send in something for him,  He thinks he has conjunctivitis in his L eye   If so can you please send to [address] [place] [address] for him and then he would like a call back knowing that it was sent [phone]

## 2018-07-05 ENCOUNTER — TELEPHONE (OUTPATIENT)
Dept: FAMILY MEDICINE CLINIC | Facility: CLINIC | Age: 48
End: 2018-07-05

## 2018-07-05 DIAGNOSIS — J06.9 UPPER RESPIRATORY TRACT INFECTION, UNSPECIFIED TYPE: Primary | ICD-10-CM

## 2018-07-05 RX ORDER — AZITHROMYCIN 250 MG/1
TABLET, FILM COATED ORAL
Qty: 6 TABLET | Refills: 0 | Status: SHIPPED | OUTPATIENT
Start: 2018-07-05 | End: 2018-07-09

## 2018-07-05 NOTE — TELEPHONE ENCOUNTER
Pt called has been coughing and wants to know if you can call in z-pack for him  ganit  [phone]

## 2018-07-10 ENCOUNTER — OFFICE VISIT (OUTPATIENT)
Dept: FAMILY MEDICINE CLINIC | Facility: CLINIC | Age: 48
End: 2018-07-10
Payer: COMMERCIAL

## 2018-07-10 VITALS
TEMPERATURE: 98.8 F | OXYGEN SATURATION: 95 % | BODY MASS INDEX: 34.51 KG/M2 | DIASTOLIC BLOOD PRESSURE: 86 MMHG | HEART RATE: 98 BPM | RESPIRATION RATE: 18 BRPM | WEIGHT: 233 LBS | HEIGHT: 69 IN | SYSTOLIC BLOOD PRESSURE: 134 MMHG

## 2018-07-10 DIAGNOSIS — J06.9 UPPER RESPIRATORY TRACT INFECTION, UNSPECIFIED TYPE: Primary | ICD-10-CM

## 2018-07-10 DIAGNOSIS — R21 RASH: ICD-10-CM

## 2018-07-10 PROCEDURE — 3008F BODY MASS INDEX DOCD: CPT | Performed by: FAMILY MEDICINE

## 2018-07-10 PROCEDURE — 99213 OFFICE O/P EST LOW 20 MIN: CPT | Performed by: FAMILY MEDICINE

## 2018-07-10 PROCEDURE — 1036F TOBACCO NON-USER: CPT | Performed by: FAMILY MEDICINE

## 2018-07-10 RX ORDER — HYDROCODONE POLISTIREX AND CHLORPHENIRAMINE POLISTIREX 10; 8 MG/5ML; MG/5ML
5 SUSPENSION, EXTENDED RELEASE ORAL EVERY 12 HOURS PRN
Qty: 240 ML | Refills: 0 | Status: SHIPPED | OUTPATIENT
Start: 2018-07-10 | End: 2020-05-28

## 2018-07-10 RX ORDER — MOMETASONE FUROATE 1 MG/G
CREAM TOPICAL DAILY
Qty: 45 G | Refills: 0 | Status: SHIPPED | OUTPATIENT
Start: 2018-07-10 | End: 2020-05-28

## 2018-07-10 RX ORDER — ALBUTEROL SULFATE 90 UG/1
2 AEROSOL, METERED RESPIRATORY (INHALATION) EVERY 6 HOURS PRN
Qty: 1 INHALER | Refills: 0 | Status: SHIPPED | OUTPATIENT
Start: 2018-07-10 | End: 2021-07-17

## 2018-07-10 RX ORDER — AMOXICILLIN AND CLAVULANATE POTASSIUM 875; 125 MG/1; MG/1
1 TABLET, FILM COATED ORAL EVERY 12 HOURS SCHEDULED
Qty: 20 TABLET | Refills: 0 | Status: SHIPPED | OUTPATIENT
Start: 2018-07-10 | End: 2018-07-20

## 2018-07-10 NOTE — PATIENT INSTRUCTIONS
I suspect her symptoms are viral but will give a script for Augmentin to be picked up if no better or any worse by Monday  I suspect that the cough medicine and the albuterol inhaler will help the most   The cough medicine is very sedating so do not drive when you take it  If your we will use it during the day only take a half a tsp in the morning half a tsp in the afternoon and take a full tsp at night  Plenty of liquids tea with honey is a great decongestant  Use the mometasone to the rash and if not better f/u here

## 2018-07-10 NOTE — PROGRESS NOTES
Assessment/Plan:     Chronic Problems:  No problem-specific Assessment & Plan notes found for this encounter  Visit Diagnosis:  Diagnoses and all orders for this visit:    Upper respiratory tract infection, unspecified type  Comments:  Suspect viral but will give script for abx if not better or any worse over the weekend  Orders:  -     amoxicillin-clavulanate (AUGMENTIN) 875-125 mg per tablet; Take 1 tablet by mouth every 12 (twelve) hours for 10 days  -     albuterol (VENTOLIN HFA) 90 mcg/act inhaler; Inhale 2 puffs every 6 (six) hours as needed for wheezing  -     hydrocodone-chlorpheniramine polistirex (TUSSIONEX) 10-8 mg/5 mL ER suspension; Take 5 mL by mouth every 12 (twelve) hours as needed for cough Max Daily Amount: 10 mL    Rash  Comments: Will rx mometasone  Orders:  -     mometasone (ELOCON) 0 1 % cream; Apply topically daily          Subjective:    Patient ID: Dana Caceres is a 50 y o  male  Sick with increased mucous in the chest since last Tuesday  Called Dr Susi Nails and was given a 5 days zpak which did nothing  Having coughing jags and can't catch his breath  H/o allergies to dust etc, but this is different  Feels it in his chest  Head is draining and coughing up increased mucous  Wheezing now from the phlegm in his chest  Has not had an inhaler in years  Felt feverish on Tuesday and Wednesday  No one else at home is sick  Sore throat last night  Takes all other meds as directed  No side effects noted  The following portions of the patient's history were reviewed and updated as appropriate: allergies, current medications, past family history, past medical history, past social history, past surgical history and problem list     Review of Systems   Constitutional: Positive for diaphoresis (but resolving  ) and fever (earlier on, but seems to have resolved  )  Negative for chills and fatigue     HENT: Positive for congestion, ear pain (right ear feels like he has pressure in the ear), sinus pain, sinus pressure and sore throat  Negative for postnasal drip and sneezing  Respiratory: Positive for cough, shortness of breath and wheezing  Cardiovascular: Negative for chest pain and palpitations  Gastrointestinal: Negative  Genitourinary: Negative  Musculoskeletal: Positive for arthralgias  Had muscle aches early on  Skin: Positive for rash (on the left huip and lateral buttock area  Very itchy  )  Neurological: Positive for headaches  Negative for dizziness and light-headedness  Psychiatric/Behavioral: Positive for dysphoric mood  The patient is nervous/anxious  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  /86   Pulse 98   Temp 98 8 °F (37 1 °C)   Resp 18   Ht 5' 9" (1 753 m)   Wt 106 kg (233 lb)   SpO2 95%   BMI 34 41 kg/m²   Social History     Social History    Marital status: /Civil Union     Spouse name: N/A    Number of children: N/A    Years of education: N/A     Occupational History    Not on file       Social History Main Topics    Smoking status: Never Smoker    Smokeless tobacco: Never Used    Alcohol use Yes      Comment: rarely    Drug use: No    Sexual activity: Not on file     Other Topics Concern    Not on file     Social History Narrative    No narrative on file     Past Medical History:   Diagnosis Date    CPAP (continuous positive airway pressure) dependence     Hyperlipidemia     Hypertension     Sleep apnea      Family History   Problem Relation Age of Onset    Hypertension Mother     Hypertension Father     Heart disease Father      Past Surgical History:   Procedure Laterality Date    APPENDECTOMY      KNEE SURGERY Left     [place] LAP, VENTRAL HERNIA REPAIR,REDUCIBLE N/A 1/10/2018    Procedure: LAPAROSCOPIC UMBILICAL HERNIA REPAIR WITH MESH;  Surgeon: Lyndon Vargas MD;  Location: [facility] OR;  Service: General       Current Outpatient Prescriptions:     amLODIPine (NORVASC) 5 mg tablet, take 1 tablet by mouth once daily, Disp: 30 tablet, Rfl: 5    aspirin 81 MG tablet, Take 81 tablets by mouth daily, Disp: , Rfl:     atorvastatin (LIPITOR) 40 mg tablet, take 1 tablet by mouth once daily, Disp: 30 tablet, Rfl: 5    venlafaxine (EFFEXOR-XR) 75 mg 24 hr capsule, take 1 capsule by mouth daily, Disp: 30 capsule, Rfl: 5    albuterol (VENTOLIN HFA) 90 mcg/act inhaler, Inhale 2 puffs every 6 (six) hours as needed for wheezing, Disp: 1 Inhaler, Rfl: 0    amoxicillin-clavulanate (AUGMENTIN) 875-125 mg per tablet, Take 1 tablet by mouth every 12 (twelve) hours for 10 days, Disp: 20 tablet, Rfl: 0    hydrocodone-chlorpheniramine polistirex (TUSSIONEX) 10-8 mg/5 mL ER suspension, Take 5 mL by mouth every 12 (twelve) hours as needed for cough Max Daily Amount: 10 mL, Disp: 240 mL, Rfl: 0    mometasone (ELOCON) 0 1 % cream, Apply topically daily, Disp: 45 g, Rfl: 0    Allergies   Allergen Reactions    Betadine [Povidone Iodine] Rash          Lab Review   No visits with results within 2 Month(s) from this visit  Latest known visit with results is:   Appointment on 01/08/2018   Component Date Value    Sodium 01/08/2018 138     Potassium 01/08/2018 4 0     Chloride 01/08/2018 104     CO2 01/08/2018 27     Anion Gap 01/08/2018 7     BUN 01/08/2018 15     Creatinine 01/08/2018 1 09     Glucose 01/08/2018 110     Calcium 01/08/2018 8 9     eGFR 01/08/2018 79     WBC 01/08/2018 5 55     RBC 01/08/2018 5 13     Hemoglobin 01/08/2018 14 0     Hematocrit 01/08/2018 42 5     MCV 01/08/2018 83     MCH 01/08/2018 27 3     MCHC 01/08/2018 32 9     RDW 01/08/2018 12 6     Platelets 09/25/8119 235     MPV 01/08/2018 9 1         Imaging: No results found  Objective:     Physical Exam   Constitutional: He is oriented to person, place, and time  He appears well-developed and well-nourished  No distress  HENT:   Head: Normocephalic and atraumatic     Right Ear: External ear normal    Left Ear: External ear normal    Eyes: Conjunctivae and EOM are normal  Pupils are equal, round, and reactive to light  Right eye exhibits no discharge  Left eye exhibits no discharge  Neck: Normal range of motion  Neck supple  No thyromegaly present  Cardiovascular: Normal rate, regular rhythm and normal heart sounds  Pulmonary/Chest: Effort normal and breath sounds normal  No respiratory distress  He has no wheezes  He has no rales  Musculoskeletal: He exhibits no edema or tenderness  Lymphadenopathy:     He has no cervical adenopathy  Neurological: He is alert and oriented to person, place, and time  Skin: Skin is warm and dry  He is not diaphoretic  Too long linear erythematous areas noticed over left hip to upper thigh  Lateral to that are too wider raised red areas  No dermatographism  Psychiatric: He has a normal mood and affect  His behavior is normal  Judgment and thought content normal          Patient Instructions   I suspect her symptoms are viral but will give a script for Augmentin to be picked up if no better or any worse by Monday  I suspect that the cough medicine and the albuterol inhaler will help the most   The cough medicine is very sedating so do not drive when you take it  If your we will use it during the day only take a half a tsp in the morning half a tsp in the afternoon and take a full tsp at night  Plenty of liquids tea with honey is a great decongestant  Use the mometasone to the rash and if not better f/u here         FATOUMATA Colmenares

## 2018-09-19 ENCOUNTER — TELEPHONE (OUTPATIENT)
Dept: FAMILY MEDICINE CLINIC | Facility: CLINIC | Age: 48
End: 2018-09-19

## 2018-09-19 DIAGNOSIS — J06.9 UPPER RESPIRATORY TRACT INFECTION, UNSPECIFIED TYPE: Primary | ICD-10-CM

## 2018-09-19 RX ORDER — AZITHROMYCIN 250 MG/1
TABLET, FILM COATED ORAL
Qty: 6 TABLET | Refills: 0 | Status: SHIPPED | OUTPATIENT
Start: 2018-09-19 | End: 2018-09-23

## 2018-09-19 NOTE — TELEPHONE ENCOUNTER
Pt called and has cold congestion and wants to know if you can call in a z-pack for him  [phone]

## 2018-09-26 ENCOUNTER — TELEPHONE (OUTPATIENT)
Dept: FAMILY MEDICINE CLINIC | Facility: CLINIC | Age: 48
End: 2018-09-26

## 2018-09-26 NOTE — TELEPHONE ENCOUNTER
Pt called and is still not better and ask for the last prescription priyanka gave him,   [phone]

## 2018-09-27 DIAGNOSIS — J32.9 RHINOSINUSITIS: Primary | ICD-10-CM

## 2018-09-27 DIAGNOSIS — J31.0 RHINOSINUSITIS: Primary | ICD-10-CM

## 2018-09-27 RX ORDER — AMOXICILLIN AND CLAVULANATE POTASSIUM 875; 125 MG/1; MG/1
1 TABLET, FILM COATED ORAL EVERY 12 HOURS SCHEDULED
Qty: 20 TABLET | Refills: 0 | Status: SHIPPED | OUTPATIENT
Start: 2018-09-27 | End: 2018-10-07

## 2018-11-02 DIAGNOSIS — E78.2 MIXED HYPERLIPIDEMIA: ICD-10-CM

## 2018-11-02 RX ORDER — ATORVASTATIN CALCIUM 40 MG/1
40 TABLET, FILM COATED ORAL DAILY
Qty: 30 TABLET | Refills: 5 | Status: SHIPPED | OUTPATIENT
Start: 2018-11-02 | End: 2019-04-24 | Stop reason: SDUPTHER

## 2018-11-02 NOTE — TELEPHONE ENCOUNTER
6/4/2018         RE: Stephanie Kathleen  [address]  [place] [facility] [place] [address]        Dear Colleague,    Thank you for referring your patient, Stephanie Kathleen, to the [facility]. Please see a copy of my visit note below.    Stephanie Kathleen is a 69 year old year old female patient here today for evaluation and managment of basal cell carcinoma on forehead.  Patient reports the following previous treatments none.  Patient reports the following modifying factors none.  Associated symptoms: none.  Patient has no other skin complaints today.  Remainder of the HPI, Meds, PMH, Allergies, FH, and SH was reviewed in chart.      Past Medical History:   Diagnosis Date     Arthritis      Basal cell carcinoma      COPD (chronic obstructive pulmonary disease) (H)      Depression      Depressive disorder     MS related     HTN      Hyperlipidemia      Lung cancer (H)      MS (multiple sclerosis) (H)      Squamous cell carcinoma      TIA (transient ischaemic attack)        Past Surgical History:   Procedure Laterality Date     ABDOMEN SURGERY  1973,1975    C-sections     BIOPSY       C APPENDECTOMY       C TOTAL KNEE ARTHROPLASTY  01/2007    left knee     C TOTAL KNEE ARTHROPLASTY  1/10    right knee     C/SECTION, LOW TRANSVERSE      x 2     CARPAL TUNNEL RELEASE RT/LT      bilateral     CHOLECYSTECTOMY  2010     COLONOSCOPY  7/1/2013    Procedure: COLONOSCOPY;  colonoscopy       LOBECTOMY  02/2008    lower lobe        Family History   Problem Relation Age of Onset     CANCER Mother      ovarian     Hypertension Father      CANCER Father      Cardiovascular Father      HEART DISEASE Father      Depression Father      Arthritis Sister      CANCER Sister      ovarian and breast     Obesity Sister      Hypertension Sister      Alcohol/Drug Daughter      Depression Daughter      Arthritis Sister      CANCER Sister      Alcohol/Drug Daughter      Depression Daughter      CANCER Paternal Grandfather      lung      rf Atorvastatin 40 mg  #30 5rf  RA SHON SUSHIL Obesity Paternal Grandfather      Obesity Paternal Grandmother      Breast Cancer Sister      Melanoma No family hx of        Social History     Social History     Marital status:      Spouse name: N/A     Number of children: N/A     Years of education: N/A     Occupational History      Retired     Social History Main Topics     Smoking status: Former Smoker     Packs/day: 1.00     Years: 15.00     Types: Cigarettes     Quit date: 5/12/1999     Smokeless tobacco: Never Used      Comment: quit in 1999     Alcohol use No     Drug use: No     Sexual activity: No     Other Topics Concern     Parent/Sibling W/ Cabg, Mi Or Angioplasty Before 65f 55m? No     Social History Narrative       Outpatient Encounter Prescriptions as of 6/4/2018   Medication Sig Dispense Refill     Cholecalciferol 1000 UNT/0.03ML LIQD Take 5 drops every day by oral route.       fluocinonide (LIDEX) 0.05 % cream Apply sparingly to affected area twice daily as needed.  Do not apply to face. 120 g 3     fluocinonide (LIDEX) 0.05 % solution Apply sparingly to affected area twice daily as needed on scalp .  Do not apply to face. 120 mL 3     losartan-hydrochlorothiazide (HYZAAR) 100-25 MG per tablet Take 1 tablet by mouth daily 30 tablet 0     PAZEO 0.7 % SOLN PLACE 1 DROP IN BOTH EYES D  3     pravastatin (PRAVACHOL) 10 MG tablet Take 1 tablet every 3rd day 30 tablet 3     sertraline (ZOLOFT) 50 MG tablet Take 1 tablet (50 mg) by mouth daily 30 tablet 0     No facility-administered encounter medications on file as of 6/4/2018.              Review Of Systems  Skin: As above  Eyes: negative  Ears/Nose/Throat: negative  Respiratory: No shortness of breath, dyspnea on exertion, cough, or hemoptysis  Cardiovascular: negative  Gastrointestinal: negative  Genitourinary: negative  Musculoskeletal: negative  Neurologic: negative  Psychiatric: negative  Hematologic/Lymphatic/Immunologic: negative  Endocrine: negative      O:   NAD, WDWN, Alert &  Oriented, Mood & Affect wnl, Vitals stable   Here today alone   /71  Pulse 70  SpO2 93%   General appearance normal   Vitals stable   Alert, oriented and in no acute distress     Forehead 1cm pink pearly papule       Eyes: Conjunctivae/lids:Normal     ENT: Lips, buccal mucosa, tongue: normal    MSK:Normal    Cardiovascular: peripheral edema none    Pulm: Breathing Normal    Neuro/Psych: Orientation:Normal; Mood/Affect:Normal      A/P:  1/ R forehead basal cell carcinoma  MOHS:   Location    After PGACAC discussed with patient, decision for Mohs surgery was made. Indication for Mohs was Location. Patient confirmed the site with Dr. Hickman.  After anesthesia with LEC, the tumor was excised using standard Mohs technique in 1 stages(s).  CLEAR MARGINS OBTAINED and Final defect size was 1.7 cm.       REPAIR COMPLEX: Because of the tightness of the surrounding skin and Because of the size and full thickness nature of the defect, a complex closure was planned. After LEC anesthesia and prep, Burow's triangles were excised in the relaxed skin tension lines. The wound edges were widely undermined by dissection in the subcutaneous plane until adequate tissue mobility was obtained. Hemostasis was obtained. The wound edges were closed in a layered fashion using Vicryl and Fast Absorbing Plain Gut sutures. Postoperative length was 4.2 cm.   EBL minimal; complications none; wound care routine.  The patient was discharged in good condition and will return in one week for wound evaluation.    BENIGN LESIONS DISCUSSED WITH PATIENT:  I discussed the specifics of tumor, prognosis, and genetics of benign lesions.  I explained that treatment of these lesions would be purely cosmetic and not medically neccessary.  I discussed with patient different removal options including excision, cautery and /or laser.      Nature and genetics of benign skin lesions dicussed with patient.  Signs and Symptoms of skin cancer discussed with  patient.  Patient to follow up with Primary Care provider regarding elevated blood pressure.  Patient encouraged to perform monthly skin exams.  UV precautions reviewed with patient.  Patient to follow up with Primary Care provider regarding elevated blood pressure.  Skin care regimen reviewed with patient: Eliminate harsh soaps, i.e. Dial, zest, irsih spring; Mild soaps such as Cetaphil or Dove sensitive skin, avoid hot or cold showers, aggressive use of emollients including vanicream, cetaphil or cerave discussed with patient.    Risks of non-melanoma skin cancer discussed with patient   Return to clinic 6 monthds      Again, thank you for allowing me to participate in the care of your patient.        Sincerely,        Carlos Hickman MD

## 2018-11-19 DIAGNOSIS — F41.9 ANXIETY: ICD-10-CM

## 2018-11-19 DIAGNOSIS — I10 ESSENTIAL HYPERTENSION: ICD-10-CM

## 2018-11-20 RX ORDER — VENLAFAXINE HYDROCHLORIDE 75 MG/1
75 CAPSULE, EXTENDED RELEASE ORAL DAILY
Qty: 30 CAPSULE | Refills: 3 | Status: SHIPPED | OUTPATIENT
Start: 2018-11-20 | End: 2018-12-10 | Stop reason: SDUPTHER

## 2018-11-20 RX ORDER — AMLODIPINE BESYLATE 5 MG/1
5 TABLET ORAL DAILY
Qty: 30 TABLET | Refills: 3 | Status: SHIPPED | OUTPATIENT
Start: 2018-11-20 | End: 2019-03-30 | Stop reason: SDUPTHER

## 2018-12-10 ENCOUNTER — TELEPHONE (OUTPATIENT)
Dept: FAMILY MEDICINE CLINIC | Facility: CLINIC | Age: 48
End: 2018-12-10

## 2018-12-10 DIAGNOSIS — F41.9 ANXIETY: ICD-10-CM

## 2018-12-10 RX ORDER — VENLAFAXINE HYDROCHLORIDE 75 MG/1
75 CAPSULE, EXTENDED RELEASE ORAL DAILY
Qty: 30 CAPSULE | Refills: 0 | Status: SHIPPED | OUTPATIENT
Start: 2018-12-10 | End: 2019-09-11 | Stop reason: SDUPTHER

## 2018-12-10 NOTE — TELEPHONE ENCOUNTER
It was sent November 20th, I re-sent it today  Yes, is very common to have withdrawal like symptoms if not taking it

## 2018-12-10 NOTE — TELEPHONE ENCOUNTER
Pt is asking for a rf of his Venlafaxine - he said the pharmcy was supposed to call and never did- [address]  Also he is asking since he hasn't had it , he has all kinds of side effects (coming off of it ) asking if this is normal?

## 2019-03-30 DIAGNOSIS — I10 ESSENTIAL HYPERTENSION: ICD-10-CM

## 2019-04-01 RX ORDER — AMLODIPINE BESYLATE 5 MG/1
TABLET ORAL
Qty: 30 TABLET | Refills: 3 | Status: SHIPPED | OUTPATIENT
Start: 2019-04-01 | End: 2019-07-30 | Stop reason: SDUPTHER

## 2019-04-24 DIAGNOSIS — E78.2 MIXED HYPERLIPIDEMIA: ICD-10-CM

## 2019-04-24 DIAGNOSIS — F41.9 ANXIETY: ICD-10-CM

## 2019-04-24 RX ORDER — ATORVASTATIN CALCIUM 40 MG/1
TABLET, FILM COATED ORAL
Qty: 30 TABLET | Refills: 5 | Status: SHIPPED | OUTPATIENT
Start: 2019-04-24 | End: 2019-11-04 | Stop reason: SDUPTHER

## 2019-04-24 RX ORDER — VENLAFAXINE HYDROCHLORIDE 75 MG/1
CAPSULE, EXTENDED RELEASE ORAL
Qty: 30 CAPSULE | Refills: 3 | Status: SHIPPED | OUTPATIENT
Start: 2019-04-24 | End: 2019-09-11 | Stop reason: SDUPTHER

## 2019-07-30 DIAGNOSIS — I10 ESSENTIAL HYPERTENSION: ICD-10-CM

## 2019-08-01 RX ORDER — AMLODIPINE BESYLATE 5 MG/1
TABLET ORAL
Qty: 30 TABLET | Refills: 3 | Status: SHIPPED | OUTPATIENT
Start: 2019-08-01 | End: 2019-12-01 | Stop reason: SDUPTHER

## 2019-08-28 DIAGNOSIS — F41.9 ANXIETY: ICD-10-CM

## 2019-08-28 RX ORDER — VENLAFAXINE HYDROCHLORIDE 75 MG/1
CAPSULE, EXTENDED RELEASE ORAL
Qty: 30 CAPSULE | Refills: 3 | OUTPATIENT
Start: 2019-08-28

## 2019-09-10 ENCOUNTER — TELEPHONE (OUTPATIENT)
Dept: FAMILY MEDICINE CLINIC | Facility: CLINIC | Age: 49
End: 2019-09-10

## 2019-09-10 NOTE — TELEPHONE ENCOUNTER
Pt left a message in our med line requesting a refill on Effexor  It looks like it was refused by Dr Isabella Meckel- he said pt needs an appt  LM for pt RCB to notify and schedule appt

## 2019-09-11 ENCOUNTER — OFFICE VISIT (OUTPATIENT)
Dept: FAMILY MEDICINE CLINIC | Facility: CLINIC | Age: 49
End: 2019-09-11
Payer: COMMERCIAL

## 2019-09-11 VITALS
BODY MASS INDEX: 34.56 KG/M2 | WEIGHT: 234 LBS | HEART RATE: 84 BPM | SYSTOLIC BLOOD PRESSURE: 146 MMHG | DIASTOLIC BLOOD PRESSURE: 92 MMHG | OXYGEN SATURATION: 96 % | TEMPERATURE: 98 F

## 2019-09-11 DIAGNOSIS — R20.2 NUMBNESS AND TINGLING IN BOTH HANDS: ICD-10-CM

## 2019-09-11 DIAGNOSIS — I10 ESSENTIAL HYPERTENSION: ICD-10-CM

## 2019-09-11 DIAGNOSIS — Z82.49 FAMILY HISTORY OF EARLY CAD: ICD-10-CM

## 2019-09-11 DIAGNOSIS — Z00.00 ANNUAL PHYSICAL EXAM: Primary | ICD-10-CM

## 2019-09-11 DIAGNOSIS — F41.9 ANXIETY: ICD-10-CM

## 2019-09-11 DIAGNOSIS — Z12.12 SCREENING FOR COLORECTAL CANCER: ICD-10-CM

## 2019-09-11 DIAGNOSIS — Z13.1 SCREENING FOR DIABETES MELLITUS: ICD-10-CM

## 2019-09-11 DIAGNOSIS — R20.0 NUMBNESS AND TINGLING IN BOTH HANDS: ICD-10-CM

## 2019-09-11 DIAGNOSIS — E78.2 MIXED HYPERLIPIDEMIA: ICD-10-CM

## 2019-09-11 DIAGNOSIS — Z12.11 SCREENING FOR COLORECTAL CANCER: ICD-10-CM

## 2019-09-11 PROBLEM — G56.93 NEUROPATHY OF BOTH UPPER EXTREMITIES: Status: ACTIVE | Noted: 2017-05-05

## 2019-09-11 PROCEDURE — 99396 PREV VISIT EST AGE 40-64: CPT | Performed by: PHYSICIAN ASSISTANT

## 2019-09-11 RX ORDER — VENLAFAXINE HYDROCHLORIDE 75 MG/1
75 CAPSULE, EXTENDED RELEASE ORAL DAILY
Qty: 30 CAPSULE | Refills: 5 | Status: SHIPPED | OUTPATIENT
Start: 2019-09-11 | End: 2020-02-27

## 2019-09-11 NOTE — PROGRESS NOTES
[address]    NAME: Sudhir Blake  AGE: 44 y o  SEX: male  : 1970     DATE: 2019     Assessment and Plan:     Problem List Items Addressed This Visit        Cardiovascular and Mediastinum    Essential hypertension    Relevant Orders    CBC and Platelet    Comprehensive metabolic panel    Microalbumin / creatinine urine ratio    Stress test only, exercise       Other    Hyperlipemia    Relevant Orders    Lipid panel    CBC and Platelet    Comprehensive metabolic panel    Microalbumin / creatinine urine ratio    Stress test only, exercise    Anxiety    Relevant Medications    venlafaxine (EFFEXOR-XR) 75 mg 24 hr capsule    Screening for diabetes mellitus - Primary    Numbness and tingling in both hands    Relevant Orders    XR spine cervical complete 4 or 5 vw non injury    Family history of early CAD    Relevant Orders    Stress test only, exercise          Immunizations and preventive care screenings were discussed with patient today  Appropriate education was printed on patient's after visit summary  Counseling:  · Exercise: the importance of regular exercise/physical activity was discussed  Recommend exercise 3-5 times per week for at least 30 minutes  No follow-ups on file  Chief Complaint:     Chief Complaint   Patient presents with    Well Check     medication check    Medication Refill     venlafaxine to local pharmacy    Immunizations     please discuss with patient      History of Present Illness:     Adult Annual Physical   Patient here for a comprehensive physical exam  The patient reports problems - both arms going numb and tingling more frequently  Pt also notes he has decreased strength with his  in his hands  Diet and Physical Activity  · Diet/Nutrition: poor diet  · Exercise: no formal exercise        Depression Screening  PHQ-9 Depression Screening PHQ-9:    Frequency of the following problems over the past two weeks:       Little interest or pleasure in doing things:  0 - not at all  Feeling down, depressed, or hopeless:  0 - not at all  PHQ-2 Score:  0       General Health  · Sleep: sleeps well  · Hearing: normal - bilateral   · Vision: goes for regular eye exams  · Dental: regular dental visits   Health  · Symptoms include: none     Review of Systems:     Review of Systems   Constitutional: Negative for appetite change, fatigue, fever and unexpected weight change  HENT: Negative for dental problem, ear pain, hearing loss, mouth sores, nosebleeds, rhinorrhea, tinnitus, trouble swallowing and voice change  Eyes: Negative for photophobia, pain, discharge and visual disturbance  Respiratory: Negative for cough, chest tightness, shortness of breath and wheezing  Cardiovascular: Negative for chest pain and palpitations  Gastrointestinal: Negative for abdominal pain, blood in stool, constipation, diarrhea, nausea, rectal pain and vomiting  Endocrine: Negative for cold intolerance, polydipsia, polyphagia and polyuria  Genitourinary: Negative for decreased urine volume, difficulty urinating, dysuria, frequency and urgency  Musculoskeletal: Negative for arthralgias, back pain, gait problem, joint swelling, myalgias, neck pain and neck stiffness  Skin: Negative for color change and rash  Allergic/Immunologic: Negative for environmental allergies, food allergies and immunocompromised state  Neurological: Positive for numbness  Negative for dizziness, seizures, speech difficulty, light-headedness and headaches  Hematological: Negative for adenopathy  Does not bruise/bleed easily  Psychiatric/Behavioral: Negative for behavioral problems, confusion, decreased concentration, self-injury and sleep disturbance  The patient is not nervous/anxious and is not hyperactive         Past Medical History:     Past Medical History:   Diagnosis Date    Anxiety     CPAP (continuous positive airway pressure) dependence     Depression     Hyperlipidemia     Hypertension     Sleep apnea       Past Surgical History:     Past Surgical History:   Procedure Laterality Date    APPENDECTOMY      KNEE SURGERY Left     [place] LAP, VENTRAL HERNIA REPAIR,REDUCIBLE N/A 1/10/2018    Procedure: LAPAROSCOPIC UMBILICAL HERNIA REPAIR WITH MESH;  Surgeon: Sheryl Painter MD;  Location: [facility];  Service: General      Family History:     Family History   Problem Relation Age of Onset    Hypertension Mother     Coronary artery disease Mother     Hypertension Father     Heart disease Father     Coronary artery disease Father     Coronary artery disease Brother     Hypertension Brother       Social History:     Social History     Socioeconomic History    Marital status: /Civil Union     Spouse name: Not on file    Number of children: Not on file    Years of education: Not on file    Highest education level: Not on file   Occupational History    Not on file   Social Needs    Financial resource strain: Not on file    Food insecurity:     Worry: Not on file     Inability: Not on file    Transportation needs:     Medical: Not on file     Non-medical: Not on file   Tobacco Use    Smoking status: Never Smoker    Smokeless tobacco: Never Used   Substance and Sexual Activity    Alcohol use: Yes     Comment: rarely    Drug use: No    Sexual activity: Not on file   Lifestyle    Physical activity:     Days per week: Not on file     Minutes per session: Not on file    Stress: Not on file   Relationships    Social connections:     Talks on phone: Not on file     Gets together: Not on file     Attends Muslim service: Not on file     Active member of club or organization: Not on file     Attends meetings of clubs or organizations: Not on file     Relationship status: Not on file    Intimate partner violence:     Fear of current or ex partner: Not on file Emotionally abused: Not on file     Physically abused: Not on file     Forced sexual activity: Not on file   Other Topics Concern    Not on file   Social History Narrative    Not on file      Current Medications:     Current Outpatient Medications   Medication Sig Dispense Refill    albuterol (VENTOLIN HFA) 90 mcg/act inhaler Inhale 2 puffs every 6 (six) hours as needed for wheezing 1 Inhaler 0    amLODIPine (NORVASC) 5 mg tablet take 1 tablet by mouth once daily 30 tablet 3    aspirin 81 MG tablet Take 81 tablets by mouth daily      atorvastatin (LIPITOR) 40 mg tablet take 1 tablet by mouth once daily 30 tablet 5    venlafaxine (EFFEXOR-XR) 75 mg 24 hr capsule Take 1 capsule (75 mg total) by mouth daily 30 capsule 5    hydrocodone-chlorpheniramine polistirex (TUSSIONEX) 10-8 mg/5 mL ER suspension Take 5 mL by mouth every 12 (twelve) hours as needed for cough Max Daily Amount: 10 mL (Patient not taking: Reported on 9/11/2019) 240 mL 0    mometasone (ELOCON) 0 1 % cream Apply topically daily (Patient not taking: Reported on 9/11/2019) 45 g 0     No current facility-administered medications for this visit  Allergies: Allergies   Allergen Reactions    Betadine [Povidone Iodine] Rash      Physical Exam:     /92   Pulse 84   Temp 98 °F (36 7 °C)   Wt 106 kg (234 lb)   SpO2 96%   BMI 34 56 kg/m²     Physical Exam   Constitutional: He is oriented to person, place, and time  He appears well-developed and well-nourished  HENT:   Head: Normocephalic  Right Ear: External ear normal    Left Ear: External ear normal    Mouth/Throat: Oropharynx is clear and moist    Eyes: Pupils are equal, round, and reactive to light  Conjunctivae and EOM are normal    Neck: Normal range of motion  Carotid bruit is not present  No thyromegaly present  Cardiovascular: Normal rate, regular rhythm, normal heart sounds and intact distal pulses     Pulmonary/Chest: Effort normal and breath sounds normal  Abdominal: Soft  Bowel sounds are normal  He exhibits no mass  There is no tenderness  Musculoskeletal: Normal range of motion  He exhibits no edema  Lymphadenopathy:     He has no cervical adenopathy  Neurological: He is alert and oriented to person, place, and time  He has normal reflexes  He exhibits normal muscle tone  Coordination normal    Skin: Skin is dry  Psychiatric: He has a normal mood and affect  His behavior is normal  Judgment and thought content normal    Nursing note and vitals reviewed        NGUY[address]N Stahl [id number]

## 2019-09-11 NOTE — PATIENT INSTRUCTIONS
Wellness Visit for Adults   AMBULATORY CARE:   A wellness visit  is when you see your healthcare provider to get screened for health problems  You can also get advice on how to stay healthy  Write down your questions so you remember to ask them  Ask your healthcare provider how often you should have a wellness visit  What happens at a wellness visit:  Your healthcare provider will ask about your health, and your family history of health problems  This includes high blood pressure, heart disease, and cancer  He or she will ask if you have symptoms that concern you, if you smoke, and about your mood  You may also be asked about your intake of medicines, supplements, food, and alcohol  Any of the following may be done:  · Your weight  will be checked  Your height may also be checked so your body mass index (BMI) can be calculated  Your BMI shows if you are at a healthy weight  · Your blood pressure  and heart rate will be checked  Your temperature may also be checked  · Blood and urine tests  may be done  Blood tests may be done to check your cholesterol levels  Abnormal cholesterol levels increase your risk for heart disease and stroke  You may also need a blood or urine test to check for diabetes if you are at increased risk  Urine tests may be done to look for signs of an infection or kidney disease  · A physical exam  includes checking your heartbeat and lungs with a stethoscope  Your healthcare provider may also check your skin to look for sun damage  · Screening tests  may be recommended  A screening test is done to check for diseases that may not cause symptoms  The screening tests you may need depend on your age, gender, family history, and lifestyle habits  For example, colorectal screening may be recommended if you are 48years old or older  Screening tests you need if you are a woman:   · A Pap smear  is used to screen for cervical cancer   Pap smears are usually done every 3 to 5 years depending on your age  You may need them more often if you have had abnormal Pap smear test results in the past  Ask your healthcare provider how often you should have a Pap smear  · A mammogram  is an x-ray of your breasts to screen for breast cancer  Experts recommend mammograms every 2 years starting at age 48 years  You may need a mammogram at age 52 years or younger if you have an increased risk for breast cancer  Talk to your healthcare provider about when you should start having mammograms and how often you need them  Vaccines you may need:   · Get an influenza vaccine  every year  The influenza vaccine protects you from the flu  Several types of viruses cause the flu  The viruses change over time, so new vaccines are made each year  · Get a tetanus-diphtheria (Td) booster vaccine  every 10 years  This vaccine protects you against tetanus and diphtheria  Tetanus is a severe infection that may cause painful muscle spasms and lockjaw  Diphtheria is a severe bacterial infection that causes a thick covering in the back of your mouth and throat  · Get a human papillomavirus (HPV) vaccine  if you are female and aged 23 to 32 or male 23 to 24 and never received it  This vaccine protects you from HPV infection  HPV is the most common infection spread by sexual contact  HPV may also cause vaginal, penile, and anal cancers  · Get a pneumococcal vaccine  if you are aged 72 years or older  The pneumococcal vaccine is an injection given to protect you from pneumococcal disease  Pneumococcal disease is an infection caused by pneumococcal bacteria  The infection may cause pneumonia, meningitis, or an ear infection  · Get a shingles vaccine  if you are aged 61 or older, even if you have had shingles before  The shingles vaccine is an injection to protect you from the varicella-zoster virus  This is the same virus that causes chickenpox   Shingles is a painful rash that develops in people who had chickenpox or have been exposed to the virus  How to eat healthy:  My Plate is a model for planning healthy meals  It shows the types and amounts of foods that should go on your plate  Fruits and vegetables make up about half of your plate, and grains and protein make up the other half  A serving of dairy is included on the side of your plate  The amount of calories and serving sizes you need depends on your age, gender, weight, and height  Examples of healthy foods are listed below:  · Eat a variety of vegetables  such as dark green, red, and orange vegetables  You can also include canned vegetables low in sodium (salt) and frozen vegetables without added butter or sauces  · Eat a variety of fresh fruits , canned fruit in 100% juice, frozen fruit, and dried fruit  · Include whole grains  At least half of the grains you eat should be whole grains  Examples include whole-wheat bread, wheat pasta, brown rice, and whole-grain cereals such as oatmeal     · Eat a variety of protein foods such as seafood (fish and shellfish), lean meat, and poultry without skin (turkey and chicken)  Examples of lean meats include pork leg, shoulder, or tenderloin, and beef round, sirloin, tenderloin, and extra lean ground beef  Other protein foods include eggs and egg substitutes, beans, peas, soy products, nuts, and seeds  · Choose low-fat dairy products such as skim or 1% milk or low-fat yogurt, cheese, and cottage cheese  · Limit unhealthy fats  such as butter, hard margarine, and shortening  Exercise:  Exercise at least 30 minutes per day on most days of the week  Some examples of exercise include walking, biking, dancing, and swimming  You can also fit in more physical activity by taking the stairs instead of the elevator or parking farther away from stores  Include muscle strengthening activities 2 days each week  Regular exercise provides many health benefits   It helps you manage your weight, and decreases your risk for type 2 diabetes, heart disease, stroke, and high blood pressure  Exercise can also help improve your mood  Ask your healthcare provider about the best exercise plan for you  General health and safety guidelines:   · Do not smoke  Nicotine and other chemicals in cigarettes and cigars can cause lung damage  Ask your healthcare provider for information if you currently smoke and need help to quit  E-cigarettes or smokeless tobacco still contain nicotine  Talk to your healthcare provider before you use these products  · Limit alcohol  A drink of alcohol is 12 ounces of beer, 5 ounces of wine, or 1½ ounces of liquor  · Lose weight, if needed  Being overweight increases your risk of certain health conditions  These include heart disease, high blood pressure, type 2 diabetes, and certain types of cancer  · Protect your skin  Do not sunbathe or use tanning beds  Use sunscreen with a SPF 15 or higher  Apply sunscreen at least 15 minutes before you go outside  Reapply sunscreen every 2 hours  Wear protective clothing, hats, and sunglasses when you are outside  · Drive safely  Always wear your seatbelt  Make sure everyone in your car wears a seatbelt  A seatbelt can save your life if you are in an accident  Do not use your cell phone when you are driving  This could distract you and cause an accident  Pull over if you need to make a call or send a text message  · Practice safe sex  Use latex condoms if are sexually active and have more than one partner  Your healthcare provider may recommend screening tests for sexually transmitted infections (STIs)  · Wear helmets, lifejackets, and protective gear  Always wear a helmet when you ride a bike or motorcycle, go skiing, or play sports that could cause a head injury  Wear protective equipment when you play sports  Wear a lifejacket when you are on a boat or doing water sports    © 2017 [employer] Information is for End User's use only and may not be sold, redistributed or otherwise used for commercial purposes  All illustrations and images included in CareNotes® are the copyrighted property of [employer]  or [employer]  The above information is an  only  It is not intended as medical advice for individual conditions or treatments  Talk to your doctor, nurse or pharmacist before following any medical regimen to see if it is safe and effective for you  Cholesterol and Your Health   AMBULATORY CARE:   Cholesterol  is a waxy, fat-like substance  Cholesterol is made by your body, but also comes from certain foods you eat  Your body uses cholesterol to make hormones and new cells  Your body also uses cholesterol to protect nerves  Cholesterol comes from foods such as meat and dairy products  Your total cholesterol level is made up by LDL cholesterol, HDL cholesterol, and triglycerides:  · LDL cholesterol  is called bad cholesterol  because it forms plaque in your arteries  As plaque builds up, your arteries become narrow, and less blood flows through  When plaque decreases blood flow to your heart, you may have chest pain  If plaque completely blocks an artery that bring blood to your heart, you may have a heart attack  Plaque can break off and form blood clots  Blood clots may block arteries in your brain and cause a stroke  · HDL cholesterol  is called good cholesterol  because it helps remove LDL cholesterol from your arteries  It does this by attaching to LDL cholesterol and carrying it to your liver  Your liver breaks down LDL cholesterol so your body can get rid of it  High levels of HDL cholesterol can help prevent a heart attack and stroke  Low levels of HDL cholesterol can increase your risk for heart disease, heart attack, and stroke  · Triglycerides  are a type of fat that store energy from foods you eat  High levels of triglycerides also cause plaque buildup   This can increase your risk for a heart attack or stroke  If your triglyceride level is high, your LDL cholesterol level may also be high  How food affects your cholesterol levels:   · Unhealthy fats  increase LDL cholesterol and triglyceride levels in your blood  They are found in foods high in cholesterol, saturated fat, and trans fat:     ¨ Cholesterol  is found in eggs, dairy, and meat  ¨ Saturated fat  is found in butter, cheese, ice cream, whole milk, and coconut oil  Saturated fat is also found in meat, such as sausage, hot dogs, and bologna  ¨ Trans fat  is found in liquid oils and is used in fried and baked foods  Foods that contain trans fats include chips, crackers, muffins, sweet rolls, microwave popcorn, and cookies  · Healthy fats,  also called unsaturated fats, help lower LDL cholesterol and triglyceride levels  Healthy fats include the following:     ¨ Monounsaturated fats  are found in foods such as olive oil, canola oil, avocado, nuts, and olives  ¨ Polyunsaturated fats,  such as omega 3 fats, are found in fish, such as salmon, trout, and tuna  They can also be found in plant foods such as flaxseed, walnuts, and soybeans  Other things that affect your cholesterol levels:   · Smoking cigarettes    · Being overweight or obese     · Drinking large amounts of alcohol    · Not enough exercise or no exercise    · Certain genes passed from your parents to you  What you need to know about having your cholesterol levels checked: Adults 21to 39years of age should have their cholesterol levels checked every 4 to 6 years  Adults 45 years and older should have their cholesterol checked every 1 to 2 years  You may need your cholesterol checked more often, or at a younger age, if you have risk factors for heart disease  You may also need to have your cholesterol checked more often if you have other health conditions, such as diabetes  Blood tests are used to check cholesterol levels   Blood tests measure your levels of triglycerides, LDL cholesterol, and HDL cholesterol  Cholesterol level goals: Your cholesterol level goal may depend on your risk for heart disease  It may also depend on your age and other health conditions  Ask your healthcare provider if the following goals are right for you:  · Your total cholesterol level  should be less than 200 mg/dL  This number may also depend on your HDL and LDL cholesterol goals  · Your LDL cholesterol level  should be less than 130 mg/dL  · Your HDL cholesterol level  should be 60 mg/dL or higher  · Your triglyceride level  should be less than 150 mg/dL  Treatment for high cholesterol:  Treatment for high cholesterol will also decrease your risk of heart disease, heart attack, and stroke  Treatment may include any of the following:  · Medicines  may be given to lower your LDL cholesterol, triglyceride levels, or total cholesterol level  You may need medicines to lower your cholesterol if any of the following is true:     ¨ You have a history of stroke, TIA, unstable angina, or a heart attack    ¨ Your LDL cholesterol level is 190 mg/dL or higher    ¨ You are age 36to 76years of age, have diabetes, and your LDL cholesterol is 70 mg/dL or higher    ¨ You are age 36to 76years of age, have risk factors for heart disease, and your LDL cholesterol is 70 mg/dL or higher    · Lifestyle changes  include changes to your diet, exercise, weight loss, and quitting smoking  It also includes decreasing the amount of alcohol you drink  · Supplements  include fish oil, red yeast rice, and garlic  Fish oil may help lower your triglyceride and LDL cholesterol levels  It may also increase your HDL cholesterol level  Red yeast rice may help decrease your total cholesterol level and LDL cholesterol level  Garlic may help lower your total cholesterol level  Do not take these supplements without talking to your healthcare provider     Nutrition to help lower your cholesterol levels:  A registered dietitian can help you create a healthy eating plan  Read food labels and choose foods low in saturated fat, trans fats, and cholesterol  · Decrease the total amount of fat you eat  Choose lean meats, fat-free or 1% fat milk, and low-fat dairy products, such as yogurt and cheese  Try to limit or avoid red meats  Limit or do not eat fried foods or baked goods such as cookies  · Replace unhealthy fats with healthy fats  Cook foods in olive oil or canola oil  Choose soft margarines that are low in saturated fat and trans fat  Seeds, nuts, and avocados are other examples of healthy fats  · Eat foods with omega-3 fats  Examples include salmon, tuna, mackerel, walnuts, and flaxseed  Eat fish 2 times per week  Children and pregnant women should not eat fish that have high levels of mercury, such as shark, swordfish, and paul mackerel  · Increase the amount of plant-based foods you eat  Plant-based foods are low in cholesterol and fat  Eating more of these foods may help lower your cholesterol and help you lose weight  Examples of plant-based foods includes fruits, vegetables, legumes, and whole grains  Replace milk that contains dairy with almond, soy, or coconut milk  Eat beans and foods with soy for protein instead of meat  Ask your healthcare provider or dietitian for more information on plant-based foods  · Increase the amount of fiber you eat  High-fiber foods can help lower your LDL cholesterol  You should eat between 20 and 30 grams of fiber each day  Eat at least 5 servings of fruits and vegetables each day  Other examples of high-fiber foods include whole-grain or whole-wheat breads, pastas, or cereals, and brown rice  Eat 3 ounces of whole-grain foods each day  Increase fiber slowly  You may have abdominal discomfort, bloating, and gas if you add fiber to your diet too quickly  Lifestyle changes you can make to help lower your cholesterol levels:   · Maintain a healthy weight  Ask your healthcare provider how much you should weigh  Ask him or her to help you create a weight loss plan if you are overweight  Weight loss can decrease your total cholesterol and triglyceride levels  · Exercise regularly  Exercise can help lower your total cholesterol level and maintain a healthy weight  Exercise can also help increase your HDL cholesterol level  Work with your healthcare provider to create an exercise program that is right for you  Get at least 30 minutes of moderate exercise most days of the week  Examples of exercise include brisk walking, swimming, or biking  · Do not smoke  Nicotine and other chemicals in cigarettes and cigars can damage your lungs, heart, and blood vessels  They can also raise your triglyceride levels  Ask your healthcare provider for information if you currently smoke and need help to quit  E-cigarettes or smokeless tobacco still contain nicotine  Talk to your healthcare provider before you use these products  · Limit or do not drink alcohol  Alcohol can increase your triglyceride levels  Ask your healthcare provider if it is safe for you to drink alcohol  Also ask how much is safe for you to drink each day  © 2017 [facility] Information is for End User's use only and may not be sold, redistributed or otherwise used for commercial purposes  All illustrations and images included in CareNotes® are the copyrighted property of [employer]  or [employer]rick Schafer  The above information is an  only  It is not intended as medical advice for individual conditions or treatments  Talk to your doctor, nurse or pharmacist before following any medical regimen to see if it is safe and effective for you

## 2019-11-04 DIAGNOSIS — E78.2 MIXED HYPERLIPIDEMIA: ICD-10-CM

## 2019-11-04 RX ORDER — ATORVASTATIN CALCIUM 40 MG/1
40 TABLET, FILM COATED ORAL DAILY
Qty: 30 TABLET | Refills: 5 | Status: SHIPPED | OUTPATIENT
Start: 2019-11-04 | End: 2020-04-08

## 2019-12-01 DIAGNOSIS — I10 ESSENTIAL HYPERTENSION: ICD-10-CM

## 2019-12-03 RX ORDER — AMLODIPINE BESYLATE 5 MG/1
TABLET ORAL
Qty: 30 TABLET | Refills: 3 | Status: SHIPPED | OUTPATIENT
Start: 2019-12-03 | End: 2020-03-09 | Stop reason: SDUPTHER

## 2020-02-18 ENCOUNTER — OFFICE VISIT (OUTPATIENT)
Dept: FAMILY MEDICINE CLINIC | Facility: CLINIC | Age: 50
End: 2020-02-18
Payer: COMMERCIAL

## 2020-02-18 VITALS
OXYGEN SATURATION: 97 % | HEIGHT: 70 IN | SYSTOLIC BLOOD PRESSURE: 135 MMHG | BODY MASS INDEX: 33.13 KG/M2 | WEIGHT: 231.4 LBS | TEMPERATURE: 97.3 F | RESPIRATION RATE: 14 BRPM | DIASTOLIC BLOOD PRESSURE: 95 MMHG | HEART RATE: 83 BPM

## 2020-02-18 DIAGNOSIS — H10.31 ACUTE BACTERIAL CONJUNCTIVITIS OF RIGHT EYE: Primary | ICD-10-CM

## 2020-02-18 PROCEDURE — 1036F TOBACCO NON-USER: CPT | Performed by: FAMILY MEDICINE

## 2020-02-18 PROCEDURE — 99213 OFFICE O/P EST LOW 20 MIN: CPT | Performed by: FAMILY MEDICINE

## 2020-02-18 PROCEDURE — 3008F BODY MASS INDEX DOCD: CPT | Performed by: FAMILY MEDICINE

## 2020-02-18 PROCEDURE — 3075F SYST BP GE 130 - 139MM HG: CPT | Performed by: FAMILY MEDICINE

## 2020-02-18 PROCEDURE — 3080F DIAST BP >= 90 MM HG: CPT | Performed by: FAMILY MEDICINE

## 2020-02-18 RX ORDER — TOBRAMYCIN 3 MG/ML
1 SOLUTION/ DROPS OPHTHALMIC
Qty: 1 BOTTLE | Refills: 0 | Status: SHIPPED | OUTPATIENT
Start: 2020-02-18 | End: 2020-05-28

## 2020-02-18 NOTE — PROGRESS NOTES
BMI Counseling: Body mass index is 33 2 kg/m²  The BMI is above normal  Nutrition recommendations include decreasing portion sizes, decreasing fast food intake, limiting drinks that contain sugar, moderation in carbohydrate intake and reducing intake of saturated and trans fat  Exercise recommendations include moderate physical activity 150 minutes/week  No pharmacotherapy was ordered  Assessment/Plan:     Chronic Problems:  No problem-specific Assessment & Plan notes found for this encounter  Visit Diagnosis:  Diagnoses and all orders for this visit:    Acute bacterial conjunctivitis of right eye  -     tobramycin (TOBREX) 0 3 % SOLN; Administer 1 drop into the left eye every 4 (four) hours while awake     discussed plan care, advise to its contact lenses out x,  Instructed on eye drops dosing timing warm moist soaks, call for any changes failure to improve      Subjective:    Patient ID: Jesusita Dhillon is a 44 y o  male  right eye conjunctivitis   Started this morning redness irritation to right eye, mucus strands  + contact lenses   negative loss of vision, negative pain   no involvement of left eye this time   denies sinus pressure pain negative cough, negative sore throat    no known ill contacts      The following portions of the patient's history were reviewed and updated as appropriate: allergies, current medications, past family history, past medical history, past social history, past surgical history and problem list     Review of Systems   Eyes: Positive for discharge and redness  Negative for photophobia, pain and visual disturbance  All other systems reviewed and are negative          /95 (BP Location: Left arm, Patient Position: Sitting, Cuff Size: Adult)   Pulse 83   Temp (!) 97 3 °F (36 3 °C)   Resp 14   Ht 5' 10" (1 778 m)   Wt 105 kg (231 lb 6 4 oz)   SpO2 97%   BMI 33 20 kg/m²   Social History     Socioeconomic History    Marital status: /Civil Union Spouse name: Not on file    Number of children: Not on file    Years of education: Not on file    Highest education level: Not on file   Occupational History    Not on file   Social Needs    Financial resource strain: Not on file    Food insecurity:     Worry: Not on file     Inability: Not on file    Transportation needs:     Medical: Not on file     Non-medical: Not on file   Tobacco Use    Smoking status: Never Smoker    Smokeless tobacco: Never Used   Substance and Sexual Activity    Alcohol use: Yes     Comment: rarely    Drug use: No    Sexual activity: Not on file   Lifestyle    Physical activity:     Days per week: Not on file     Minutes per session: Not on file    Stress: Not on file   Relationships    Social connections:     Talks on phone: Not on file     Gets together: Not on file     Attends Gnosticism service: Not on file     Active member of club or organization: Not on file     Attends meetings of clubs or organizations: Not on file     Relationship status: Not on file    Intimate partner violence:     Fear of current or ex partner: Not on file     Emotionally abused: Not on file     Physically abused: Not on file     Forced sexual activity: Not on file   Other Topics Concern    Not on file   Social History Narrative    Not on file     Past Medical History:   Diagnosis Date    Anxiety     CPAP (continuous positive airway pressure) dependence     Depression     Hyperlipidemia     Hypertension     Sleep apnea      Family History   Problem Relation Age of Onset    Hypertension Mother     Coronary artery disease Mother     Hypertension Father     Heart disease Father     Coronary artery disease Father     Coronary artery disease Brother     Hypertension Brother      Past Surgical History:   Procedure Laterality Date    APPENDECTOMY      KNEE SURGERY Left     MT LAP, VENTRAL HERNIA REPAIR,REDUCIBLE N/A 1/10/2018    Procedure: LAPAROSCOPIC UMBILICAL HERNIA REPAIR WITH MESH;  Surgeon: Rhianna Monson MD;  Location: [facility];  Service: General       Current Outpatient Medications:     albuterol (VENTOLIN HFA) 90 mcg/act inhaler, Inhale 2 puffs every 6 (six) hours as needed for wheezing, Disp: 1 Inhaler, Rfl: 0    amLODIPine (NORVASC) 5 mg tablet, take 1 tablet by mouth once daily, Disp: 30 tablet, Rfl: 3    aspirin 81 MG tablet, Take 81 tablets by mouth daily, Disp: , Rfl:     atorvastatin (LIPITOR) 40 mg tablet, Take 1 tablet (40 mg total) by mouth daily, Disp: 30 tablet, Rfl: 5    hydrocodone-chlorpheniramine polistirex (TUSSIONEX) 10-8 mg/5 mL ER suspension, Take 5 mL by mouth every 12 (twelve) hours as needed for cough Max Daily Amount: 10 mL (Patient not taking: Reported on 9/11/2019), Disp: 240 mL, Rfl: 0    mometasone (ELOCON) 0 1 % cream, Apply topically daily (Patient not taking: Reported on 9/11/2019), Disp: 45 g, Rfl: 0    tobramycin (TOBREX) 0 3 % SOLN, Administer 1 drop into the left eye every 4 (four) hours while awake, Disp: 1 Bottle, Rfl: 0    venlafaxine (EFFEXOR-XR) 75 mg 24 hr capsule, Take 1 capsule (75 mg total) by mouth daily, Disp: 30 capsule, Rfl: 5    Allergies   Allergen Reactions    Betadine [Povidone Iodine] Rash          Lab Review   not applicable     Imaging: No results found  Objective:     Physical Exam   Constitutional: He appears well-developed and well-nourished  No distress  HENT:   Head: Normocephalic and atraumatic  Eyes: EOM and lids are normal  Right conjunctiva is injected ( mucus strands)  No scleral icterus  Cardiovascular: Normal rate  Pulmonary/Chest: Effort normal          There are no Patient Instructions on file for this visit  FATOUMATA Menendez    Portions of the record may have been created with voice recognition software  Occasional wrong word or "sound a like" substitutions may have occurred due to the inherent limitations of voice recognition software    Read the chart carefully and recognize, using context, where substitutions have occurred

## 2020-02-27 DIAGNOSIS — F41.9 ANXIETY: ICD-10-CM

## 2020-02-27 RX ORDER — VENLAFAXINE HYDROCHLORIDE 75 MG/1
CAPSULE, EXTENDED RELEASE ORAL
Qty: 30 CAPSULE | Refills: 1 | Status: SHIPPED | OUTPATIENT
Start: 2020-02-27 | End: 2020-04-08

## 2020-03-09 DIAGNOSIS — I10 ESSENTIAL HYPERTENSION: ICD-10-CM

## 2020-03-09 RX ORDER — AMLODIPINE BESYLATE 5 MG/1
5 TABLET ORAL DAILY
Qty: 30 TABLET | Refills: 3 | Status: SHIPPED | OUTPATIENT
Start: 2020-03-09 | End: 2020-07-27

## 2020-04-08 DIAGNOSIS — F41.9 ANXIETY: ICD-10-CM

## 2020-04-08 DIAGNOSIS — E78.2 MIXED HYPERLIPIDEMIA: ICD-10-CM

## 2020-04-08 RX ORDER — ATORVASTATIN CALCIUM 40 MG/1
TABLET, FILM COATED ORAL
Qty: 30 TABLET | Refills: 5 | Status: SHIPPED | OUTPATIENT
Start: 2020-04-08 | End: 2020-05-01

## 2020-04-08 RX ORDER — VENLAFAXINE HYDROCHLORIDE 75 MG/1
CAPSULE, EXTENDED RELEASE ORAL
Qty: 30 CAPSULE | Refills: 1 | Status: SHIPPED | OUTPATIENT
Start: 2020-04-08 | End: 2020-05-01

## 2020-04-13 ENCOUNTER — TELEMEDICINE (OUTPATIENT)
Dept: FAMILY MEDICINE CLINIC | Facility: CLINIC | Age: 50
End: 2020-04-13
Payer: COMMERCIAL

## 2020-04-13 DIAGNOSIS — M25.512 ACUTE PAIN OF LEFT SHOULDER: Primary | ICD-10-CM

## 2020-04-13 PROCEDURE — 99213 OFFICE O/P EST LOW 20 MIN: CPT | Performed by: FAMILY MEDICINE

## 2020-04-13 RX ORDER — CELECOXIB 200 MG/1
200 CAPSULE ORAL DAILY
Qty: 30 CAPSULE | Refills: 1 | Status: SHIPPED | OUTPATIENT
Start: 2020-04-13 | End: 2020-06-12

## 2020-05-01 ENCOUNTER — TELEPHONE (OUTPATIENT)
Dept: FAMILY MEDICINE CLINIC | Facility: CLINIC | Age: 50
End: 2020-05-01

## 2020-05-01 DIAGNOSIS — E78.2 MIXED HYPERLIPIDEMIA: ICD-10-CM

## 2020-05-01 DIAGNOSIS — F41.9 ANXIETY: ICD-10-CM

## 2020-05-01 RX ORDER — VENLAFAXINE HYDROCHLORIDE 75 MG/1
CAPSULE, EXTENDED RELEASE ORAL
Qty: 30 CAPSULE | Refills: 1 | Status: SHIPPED | OUTPATIENT
Start: 2020-05-01 | End: 2020-08-31

## 2020-05-01 RX ORDER — ATORVASTATIN CALCIUM 40 MG/1
TABLET, FILM COATED ORAL
Qty: 30 TABLET | Refills: 5 | Status: SHIPPED | OUTPATIENT
Start: 2020-05-01 | End: 2021-04-26

## 2020-05-04 ENCOUNTER — OFFICE VISIT (OUTPATIENT)
Dept: FAMILY MEDICINE CLINIC | Facility: CLINIC | Age: 50
End: 2020-05-04
Payer: COMMERCIAL

## 2020-05-04 DIAGNOSIS — M25.512 ACUTE PAIN OF LEFT SHOULDER: Primary | ICD-10-CM

## 2020-05-04 PROCEDURE — 3075F SYST BP GE 130 - 139MM HG: CPT | Performed by: FAMILY MEDICINE

## 2020-05-04 PROCEDURE — 1036F TOBACCO NON-USER: CPT | Performed by: FAMILY MEDICINE

## 2020-05-04 PROCEDURE — 99213 OFFICE O/P EST LOW 20 MIN: CPT | Performed by: FAMILY MEDICINE

## 2020-05-04 PROCEDURE — 3080F DIAST BP >= 90 MM HG: CPT | Performed by: FAMILY MEDICINE

## 2020-05-14 ENCOUNTER — HOSPITAL ENCOUNTER (OUTPATIENT)
Dept: MRI IMAGING | Facility: CLINIC | Age: 50
Discharge: HOME | End: 2020-05-14
Payer: COMMERCIAL

## 2020-05-14 DIAGNOSIS — M25.512 ACUTE PAIN OF LEFT SHOULDER: ICD-10-CM

## 2020-05-14 PROCEDURE — 73221 MRI JOINT UPR EXTREM W/O DYE: CPT

## 2020-05-15 DIAGNOSIS — S46.012D TRAUMATIC COMPLETE TEAR OF LEFT ROTATOR CUFF, SUBSEQUENT ENCOUNTER: Primary | ICD-10-CM

## 2020-05-19 ENCOUNTER — TELEPHONE (OUTPATIENT)
Dept: OBGYN CLINIC | Facility: CLINIC | Age: 50
End: 2020-05-19

## 2020-05-22 ENCOUNTER — OFFICE VISIT (OUTPATIENT)
Dept: OBGYN CLINIC | Facility: CLINIC | Age: 50
End: 2020-05-22
Payer: COMMERCIAL

## 2020-05-22 VITALS
HEIGHT: 70 IN | HEART RATE: 88 BPM | DIASTOLIC BLOOD PRESSURE: 90 MMHG | WEIGHT: 234 LBS | BODY MASS INDEX: 33.5 KG/M2 | RESPIRATION RATE: 18 BRPM | SYSTOLIC BLOOD PRESSURE: 153 MMHG

## 2020-05-22 DIAGNOSIS — Z01.818 ENCOUNTER FOR PREADMISSION TESTING: Primary | ICD-10-CM

## 2020-05-22 DIAGNOSIS — M75.22 BICIPITAL TENDINITIS OF LEFT SHOULDER: ICD-10-CM

## 2020-05-22 DIAGNOSIS — S46.012S TRAUMATIC COMPLETE TEAR OF LEFT ROTATOR CUFF, SEQUELA: Primary | ICD-10-CM

## 2020-05-22 PROCEDURE — 99204 OFFICE O/P NEW MOD 45 MIN: CPT | Performed by: ORTHOPAEDIC SURGERY

## 2020-05-22 PROCEDURE — 3080F DIAST BP >= 90 MM HG: CPT | Performed by: ORTHOPAEDIC SURGERY

## 2020-05-22 PROCEDURE — 1036F TOBACCO NON-USER: CPT | Performed by: ORTHOPAEDIC SURGERY

## 2020-05-22 PROCEDURE — 3077F SYST BP >= 140 MM HG: CPT | Performed by: ORTHOPAEDIC SURGERY

## 2020-05-28 DIAGNOSIS — M75.102 TEAR OF LEFT ROTATOR CUFF, UNSPECIFIED TEAR EXTENT, UNSPECIFIED WHETHER TRAUMATIC: ICD-10-CM

## 2020-05-28 PROCEDURE — U0003 INFECTIOUS AGENT DETECTION BY NUCLEIC ACID (DNA OR RNA); SEVERE ACUTE RESPIRATORY SYNDROME CORONAVIRUS 2 (SARS-COV-2) (CORONAVIRUS DISEASE [COVID-19]), AMPLIFIED PROBE TECHNIQUE, MAKING USE OF HIGH THROUGHPUT TECHNOLOGIES AS DESCRIBED BY CMS-2020-01-R: HCPCS

## 2020-05-28 NOTE — PRE-PROCEDURE INSTRUCTIONS
Pre-Surgery Instructions:   Medication Instructions    albuterol (VENTOLIN HFA) 90 mcg/act inhaler Instructed patient per Anesthesia Guidelines   amLODIPine (NORVASC) 5 mg tablet Advised pt to take morning of surgery with small sip of water   aspirin 81 MG tablet Advised patient to hold 1 week prior to surgery   atorvastatin (LIPITOR) 40 mg tablet Instructed patient per Anesthesia Guidelines   celecoxib (CeleBREX) 200 mg capsule Instructed patient per Anesthesia Guidelines   venlafaxine (EFFEXOR-XR) 75 mg 24 hr capsule Advised pt to take morning of surgery with small sip of water

## 2020-05-30 LAB — SARS-COV-2 RNA SPEC QL NAA+PROBE: NOT DETECTED

## 2020-06-03 ENCOUNTER — ANESTHESIA EVENT (OUTPATIENT)
Dept: PERIOP | Facility: HOSPITAL | Age: 50
End: 2020-06-03
Payer: COMMERCIAL

## 2020-06-03 NOTE — H&P (VIEW-ONLY)
HPI:  Patient is a 44y o  year old male who presents with chief complaint of left shoulder pain and weakness  He injured the shoulder in a fall while skiing  He was seen in an urgent care where x-rays were negative for fracture  He then Followed up with  Dr Melbourne Dubin who ordered an MRI when patient's symptoms did not improve  MRI discussed below  Patient is referred here for consultation  He continues to have significant shoulder pain and is unable to FF or ABD shoulder overhead  His IR is also limited  He denies neck pain and denies radicular symptoms down the arm        ROS:   General: No fever, no chills, no weight loss, no weight gain  HEENT:  No loss of hearing, no nose bleeds, no sore throat  Eyes:  No eye pain, no red eyes, no visual disturbance  Respiratory:  No cough, no shortness of breath, no wheezing  Cardiovascular:  No chest pain, no palpitations, no edema  GI: No abdominal pain, no nausea, no vomiting  Endocrine: No frequent urination, no excessive thirst  Urinary:  No dysuria, no hematuria, no incontinence  Musculoskeletal: see HPI and PE  Skin:  No rash, no wounds  Neurological:  No dizziness, no headache, no numbness  Psychiatric:  No difficulty concentrating, no depression, no suicide thoughts, no anxiety  Review of all other systems is negative    PMH:  Past Medical History:   Diagnosis Date    Anxiety     CPAP (continuous positive airway pressure) dependence     Depression     Hyperlipidemia     Hypertension     Sleep apnea        PSH:  Past Surgical History:   Procedure Laterality Date    APPENDECTOMY      KNEE SURGERY Left     [place] LAP, VENTRAL HERNIA REPAIR,REDUCIBLE N/A 1/10/2018    Procedure: LAPAROSCOPIC UMBILICAL HERNIA REPAIR WITH MESH;  Surgeon: Jaden Mehta MD;  Location: [facility];  Service: General       Medications:  Current Outpatient Medications   Medication Sig Dispense Refill    albuterol (VENTOLIN HFA) 90 mcg/act inhaler Inhale 2 puffs every 6 (six) hours as needed for wheezing 1 Inhaler 0    amLODIPine (NORVASC) 5 mg tablet Take 1 tablet (5 mg total) by mouth daily 30 tablet 3    aspirin 81 MG tablet Take 81 tablets by mouth daily      atorvastatin (LIPITOR) 40 mg tablet take 1 tablet by mouth daily 30 tablet 5    celecoxib (CeleBREX) 200 mg capsule Take 1 capsule (200 mg total) by mouth daily 30 capsule 1    venlafaxine (EFFEXOR-XR) 75 mg 24 hr capsule take 1 capsule by mouth once daily 30 capsule 1     No current facility-administered medications for this visit  Allergies: Allergies   Allergen Reactions    Betadine [Povidone Iodine] Rash       Family History:  Family History   Problem Relation Age of Onset    Hypertension Mother     Coronary artery disease Mother     Hypertension Father     Heart disease Father     Coronary artery disease Father     Coronary artery disease Brother     Hypertension Brother        Social History:  Social History     Occupational History    Not on file   Tobacco Use    Smoking status: Never Smoker    Smokeless tobacco: Never Used   Substance and Sexual Activity    Alcohol use: Yes     Frequency: 2-3 times a week     Comment: rarely    Drug use: No    Sexual activity: Not on file       Physical Exam:  General :  Alert, cooperative, no distress, appears stated age  Blood pressure 153/90, pulse 88, resp  rate 18, height 5' 10" (1 778 m), weight 106 kg (234 lb)  Head:  Normocephalic, without obvious abnormality, atraumatic   Eyes:  Conjunctiva/corneas clear, EOM's intact,   Ears: Both ears normal appearance, no hearing deficits      Nose: Nares normal, septum midline, no drainage    Neck: Supple,  trachea midline, no adenopathy, no tenderness, no mass   Back:   Symmetric, no curvature, ROM normal, no tenderness   Lungs:   Respirations unlabored, clear to auscultation   Cardiac:  Regular rate and rhythm   Extremities: Extremities normal, atraumatic, no cyanosis or edema      Pulses: 2+ and symmetric   Skin: Skin color, texture, turgor normal, no rashes or lesions      Neurologic: Normal           Left Shoulder Exam     Tenderness   The patient is experiencing tenderness in the acromion (lateral and anterior shoulder)  Range of Motion   Active abduction: 60   Passive abduction: 160   Forward flexion: 70   Internal rotation 0 degrees: L5     Muscle Strength   Abduction: 4/5   Internal rotation: 4/5   External rotation: 4/5     Tests   Apprehension: negative  Easton test: positive  Impingement: positive  Drop arm: positive  Sulcus: absent    Other   Erythema: absent  Scars: absent  Sensation: normal  Pulse: present     Comments:  Load shift test negative  Speeds equivocal  Yergson's negative            Imaging Studies: The following imaging studies were reviewed in office today  My findings are noted  MRI left shoulder 5/14/2020 Large RCT involving Supra and infraspinatus with retraction  No significant muscle atrophy  Biceps tendinosis  Partial tearing of subscapularis  AC joint arthrosis  Mild degenerative change of GH joint  Assessment  Large RCT involving Supra and infraspinatus with retraction without significant muscle atrophy  Biceps tendinosis  Partial tearing of subscapularis      Plan:  Patient has a large multi tendon rotator cuff tear  He is very symptomatic despite several months of shoulder exercises  Risks and benefits of rotator cuff repair discussed in detail with the patient and he wished to proceed  He understood risks of recurrent tear and possibility of only partial repair would be obtained  Our efforts will focus on repair of infraspinatus supraspinatus tear  We will also evaluate intraoperatively the extent of subscapularis pathology and biceps tendon pathology in consider addressing that as well  Surgery will be scheduled in the near future

## 2020-06-04 ENCOUNTER — TELEPHONE (OUTPATIENT)
Dept: OTHER | Facility: OTHER | Age: 50
End: 2020-06-04

## 2020-06-04 ENCOUNTER — ANESTHESIA (OUTPATIENT)
Dept: PERIOP | Facility: HOSPITAL | Age: 50
End: 2020-06-04
Payer: COMMERCIAL

## 2020-06-04 ENCOUNTER — HOSPITAL ENCOUNTER (OUTPATIENT)
Facility: HOSPITAL | Age: 50
Setting detail: OUTPATIENT SURGERY
Discharge: HOME | End: 2020-06-04
Attending: ORTHOPAEDIC SURGERY | Admitting: ORTHOPAEDIC SURGERY
Payer: COMMERCIAL

## 2020-06-04 VITALS
HEIGHT: 70 IN | SYSTOLIC BLOOD PRESSURE: 120 MMHG | WEIGHT: 229.06 LBS | DIASTOLIC BLOOD PRESSURE: 68 MMHG | RESPIRATION RATE: 14 BRPM | BODY MASS INDEX: 32.79 KG/M2 | OXYGEN SATURATION: 95 % | TEMPERATURE: 97.4 F | HEART RATE: 76 BPM

## 2020-06-04 DIAGNOSIS — Z98.890 STATUS POST ROTATOR CUFF SURGERY: Primary | ICD-10-CM

## 2020-06-04 DIAGNOSIS — M75.122 COMPLETE TEAR OF LEFT ROTATOR CUFF, UNSPECIFIED WHETHER TRAUMATIC: ICD-10-CM

## 2020-06-04 DIAGNOSIS — M75.122 COMPLETE TEAR OF LEFT ROTATOR CUFF, UNSPECIFIED WHETHER TRAUMATIC: Primary | ICD-10-CM

## 2020-06-04 PROBLEM — M75.102 TEAR OF LEFT ROTATOR CUFF: Status: ACTIVE | Noted: 2020-06-04

## 2020-06-04 PROCEDURE — 29827 SHO ARTHRS SRG RT8TR CUF RPR: CPT | Performed by: PHYSICIAN ASSISTANT

## 2020-06-04 PROCEDURE — C9290 INJ, BUPIVACAINE LIPOSOME: HCPCS | Performed by: STUDENT IN AN ORGANIZED HEALTH CARE EDUCATION/TRAINING PROGRAM

## 2020-06-04 PROCEDURE — 29827 SHO ARTHRS SRG RT8TR CUF RPR: CPT | Performed by: ORTHOPAEDIC SURGERY

## 2020-06-04 PROCEDURE — C1713 ANCHOR/SCREW BN/BN,TIS/BN: HCPCS | Performed by: ORTHOPAEDIC SURGERY

## 2020-06-04 DEVICE — SPEEDBRG IMP SYS W/BIO-COMP SWVLK
Type: IMPLANTABLE DEVICE | Status: FUNCTIONAL
Brand: ARTHREX®

## 2020-06-04 RX ORDER — GLYCOPYRROLATE 0.2 MG/ML
INJECTION INTRAMUSCULAR; INTRAVENOUS AS NEEDED
Status: DISCONTINUED | OUTPATIENT
Start: 2020-06-04 | End: 2020-06-04 | Stop reason: SURG

## 2020-06-04 RX ORDER — PROPOFOL 10 MG/ML
INJECTION, EMULSION INTRAVENOUS AS NEEDED
Status: DISCONTINUED | OUTPATIENT
Start: 2020-06-04 | End: 2020-06-04 | Stop reason: SURG

## 2020-06-04 RX ORDER — SUCCINYLCHOLINE/SOD CL,ISO/PF 100 MG/5ML
SYRINGE (ML) INTRAVENOUS AS NEEDED
Status: DISCONTINUED | OUTPATIENT
Start: 2020-06-04 | End: 2020-06-04 | Stop reason: SURG

## 2020-06-04 RX ORDER — OXYCODONE HYDROCHLORIDE 5 MG/1
5 TABLET ORAL EVERY 4 HOURS PRN
Qty: 35 TABLET | Refills: 0 | Status: SHIPPED | OUTPATIENT
Start: 2020-06-04 | End: 2020-06-04 | Stop reason: SDUPTHER

## 2020-06-04 RX ORDER — EPHEDRINE SULFATE 50 MG/ML
INJECTION INTRAVENOUS AS NEEDED
Status: DISCONTINUED | OUTPATIENT
Start: 2020-06-04 | End: 2020-06-04 | Stop reason: SURG

## 2020-06-04 RX ORDER — SENNOSIDES 8.6 MG
650 CAPSULE ORAL EVERY 8 HOURS PRN
Qty: 30 TABLET | Refills: 0 | Status: SHIPPED | OUTPATIENT
Start: 2020-06-04 | End: 2021-07-17

## 2020-06-04 RX ORDER — FENTANYL CITRATE/PF 50 MCG/ML
25 SYRINGE (ML) INJECTION
Status: DISCONTINUED | OUTPATIENT
Start: 2020-06-04 | End: 2020-06-04 | Stop reason: HOSPADM

## 2020-06-04 RX ORDER — CHLORHEXIDINE GLUCONATE 0.12 MG/ML
15 RINSE ORAL ONCE
Status: COMPLETED | OUTPATIENT
Start: 2020-06-04 | End: 2020-06-04

## 2020-06-04 RX ORDER — CEPHALEXIN 500 MG/1
500 CAPSULE ORAL 4 TIMES DAILY
Qty: 4 CAPSULE | Refills: 0 | Status: SHIPPED | OUTPATIENT
Start: 2020-06-04 | End: 2020-06-04

## 2020-06-04 RX ORDER — SODIUM CHLORIDE, SODIUM LACTATE, POTASSIUM CHLORIDE, CALCIUM CHLORIDE 600; 310; 30; 20 MG/100ML; MG/100ML; MG/100ML; MG/100ML
INJECTION, SOLUTION INTRAVENOUS CONTINUOUS PRN
Status: DISCONTINUED | OUTPATIENT
Start: 2020-06-04 | End: 2020-06-04 | Stop reason: SURG

## 2020-06-04 RX ORDER — LIDOCAINE HYDROCHLORIDE 10 MG/ML
INJECTION, SOLUTION EPIDURAL; INFILTRATION; INTRACAUDAL; PERINEURAL AS NEEDED
Status: DISCONTINUED | OUTPATIENT
Start: 2020-06-04 | End: 2020-06-04 | Stop reason: SURG

## 2020-06-04 RX ORDER — CEFAZOLIN SODIUM 2 G/50ML
2000 SOLUTION INTRAVENOUS ONCE
Status: COMPLETED | OUTPATIENT
Start: 2020-06-04 | End: 2020-06-04

## 2020-06-04 RX ORDER — HYDROMORPHONE HCL/PF 1 MG/ML
0.5 SYRINGE (ML) INJECTION
Status: DISCONTINUED | OUTPATIENT
Start: 2020-06-04 | End: 2020-06-04 | Stop reason: HOSPADM

## 2020-06-04 RX ORDER — NICOTINE 21 MG/24HR
14 PATCH, TRANSDERMAL 24 HOURS TRANSDERMAL DAILY
Status: DISCONTINUED | OUTPATIENT
Start: 2020-06-05 | End: 2020-06-04 | Stop reason: HOSPADM

## 2020-06-04 RX ORDER — ONDANSETRON 2 MG/ML
INJECTION INTRAMUSCULAR; INTRAVENOUS AS NEEDED
Status: DISCONTINUED | OUTPATIENT
Start: 2020-06-04 | End: 2020-06-04 | Stop reason: SURG

## 2020-06-04 RX ORDER — NEOSTIGMINE METHYLSULFATE 1 MG/ML
INJECTION INTRAVENOUS AS NEEDED
Status: DISCONTINUED | OUTPATIENT
Start: 2020-06-04 | End: 2020-06-04 | Stop reason: SURG

## 2020-06-04 RX ORDER — OXYCODONE HYDROCHLORIDE 5 MG/1
5 TABLET ORAL EVERY 4 HOURS PRN
Qty: 35 TABLET | Refills: 0 | Status: SHIPPED | OUTPATIENT
Start: 2020-06-04 | End: 2020-06-04 | Stop reason: HOSPADM

## 2020-06-04 RX ORDER — MIDAZOLAM HYDROCHLORIDE 2 MG/2ML
INJECTION, SOLUTION INTRAMUSCULAR; INTRAVENOUS
Status: COMPLETED | OUTPATIENT
Start: 2020-06-04 | End: 2020-06-04

## 2020-06-04 RX ORDER — FENTANYL CITRATE 50 UG/ML
INJECTION, SOLUTION INTRAMUSCULAR; INTRAVENOUS
Status: COMPLETED | OUTPATIENT
Start: 2020-06-04 | End: 2020-06-04

## 2020-06-04 RX ORDER — ONDANSETRON 2 MG/ML
4 INJECTION INTRAMUSCULAR; INTRAVENOUS ONCE AS NEEDED
Status: DISCONTINUED | OUTPATIENT
Start: 2020-06-04 | End: 2020-06-04 | Stop reason: HOSPADM

## 2020-06-04 RX ORDER — LIDOCAINE HYDROCHLORIDE 10 MG/ML
INJECTION, SOLUTION EPIDURAL; INFILTRATION; INTRACAUDAL; PERINEURAL
Status: COMPLETED | OUTPATIENT
Start: 2020-06-04 | End: 2020-06-04

## 2020-06-04 RX ORDER — OXYCODONE HYDROCHLORIDE 5 MG/1
5 TABLET ORAL EVERY 4 HOURS PRN
Qty: 30 TABLET | Refills: 0 | Status: SHIPPED | OUTPATIENT
Start: 2020-06-04 | End: 2020-06-09

## 2020-06-04 RX ORDER — DEXAMETHASONE SODIUM PHOSPHATE 4 MG/ML
INJECTION, SOLUTION INTRA-ARTICULAR; INTRALESIONAL; INTRAMUSCULAR; INTRAVENOUS; SOFT TISSUE AS NEEDED
Status: DISCONTINUED | OUTPATIENT
Start: 2020-06-04 | End: 2020-06-04 | Stop reason: SURG

## 2020-06-04 RX ORDER — BUPIVACAINE HYDROCHLORIDE 5 MG/ML
INJECTION, SOLUTION PERINEURAL
Status: COMPLETED | OUTPATIENT
Start: 2020-06-04 | End: 2020-06-04

## 2020-06-04 RX ORDER — CEPHALEXIN 500 MG/1
500 CAPSULE ORAL EVERY 6 HOURS SCHEDULED
Qty: 4 CAPSULE | Refills: 0 | Status: SHIPPED | OUTPATIENT
Start: 2020-06-04 | End: 2020-06-05 | Stop reason: SDUPTHER

## 2020-06-04 RX ORDER — SODIUM CHLORIDE, SODIUM LACTATE, POTASSIUM CHLORIDE, CALCIUM CHLORIDE 600; 310; 30; 20 MG/100ML; MG/100ML; MG/100ML; MG/100ML
100 INJECTION, SOLUTION INTRAVENOUS CONTINUOUS
Status: DISCONTINUED | OUTPATIENT
Start: 2020-06-04 | End: 2020-06-04 | Stop reason: HOSPADM

## 2020-06-04 RX ORDER — GABAPENTIN 100 MG/1
100 CAPSULE ORAL 3 TIMES DAILY
Qty: 60 CAPSULE | Refills: 0 | Status: SHIPPED | OUTPATIENT
Start: 2020-06-04 | End: 2021-07-17

## 2020-06-04 RX ORDER — ROCURONIUM BROMIDE 10 MG/ML
INJECTION, SOLUTION INTRAVENOUS AS NEEDED
Status: DISCONTINUED | OUTPATIENT
Start: 2020-06-04 | End: 2020-06-04 | Stop reason: SURG

## 2020-06-04 RX ADMIN — EPHEDRINE SULFATE 10 MG: 50 INJECTION INTRAVENOUS at 15:47

## 2020-06-04 RX ADMIN — CEFAZOLIN SODIUM 2000 MG: 2 SOLUTION INTRAVENOUS at 13:56

## 2020-06-04 RX ADMIN — NEOSTIGMINE METHYLSULFATE 3 MG: 1 INJECTION, SOLUTION INTRAVENOUS at 16:16

## 2020-06-04 RX ADMIN — FENTANYL CITRATE 25 MCG: 50 INJECTION, SOLUTION INTRAMUSCULAR; INTRAVENOUS at 15:47

## 2020-06-04 RX ADMIN — DEXAMETHASONE SODIUM PHOSPHATE 4 MG: 4 INJECTION, SOLUTION INTRAMUSCULAR; INTRAVENOUS at 13:54

## 2020-06-04 RX ADMIN — PROPOFOL 200 MG: 10 INJECTION, EMULSION INTRAVENOUS at 13:54

## 2020-06-04 RX ADMIN — GLYCOPYRROLATE 0.1 MG: 0.2 INJECTION, SOLUTION INTRAMUSCULAR; INTRAVENOUS at 13:53

## 2020-06-04 RX ADMIN — LIDOCAINE HYDROCHLORIDE 50 MG: 10 INJECTION, SOLUTION EPIDURAL; INFILTRATION; INTRACAUDAL; PERINEURAL at 13:53

## 2020-06-04 RX ADMIN — ONDANSETRON 4 MG: 2 INJECTION INTRAMUSCULAR; INTRAVENOUS at 14:13

## 2020-06-04 RX ADMIN — PHENYLEPHRINE HYDROCHLORIDE 100 MCG: 10 INJECTION INTRAVENOUS at 14:14

## 2020-06-04 RX ADMIN — PHENYLEPHRINE HYDROCHLORIDE 20 MCG/MIN: 10 INJECTION INTRAVENOUS at 14:11

## 2020-06-04 RX ADMIN — FENTANYL CITRATE 50 MCG: 50 INJECTION, SOLUTION INTRAMUSCULAR; INTRAVENOUS at 13:25

## 2020-06-04 RX ADMIN — FENTANYL CITRATE 25 MCG: 50 INJECTION, SOLUTION INTRAMUSCULAR; INTRAVENOUS at 16:06

## 2020-06-04 RX ADMIN — BUPIVACAINE HYDROCHLORIDE 10 ML: 5 INJECTION, SOLUTION PERINEURAL at 13:25

## 2020-06-04 RX ADMIN — Medication 200 MG: at 13:55

## 2020-06-04 RX ADMIN — ROCURONIUM BROMIDE 10 MG: 10 SOLUTION INTRAVENOUS at 13:54

## 2020-06-04 RX ADMIN — GLYCOPYRROLATE 0.4 MG: 0.2 INJECTION, SOLUTION INTRAMUSCULAR; INTRAVENOUS at 16:16

## 2020-06-04 RX ADMIN — BUPIVACAINE 20 ML: 13.3 INJECTION, SUSPENSION, LIPOSOMAL INFILTRATION at 13:40

## 2020-06-04 RX ADMIN — CHLORHEXIDINE GLUCONATE 0.12% ORAL RINSE 15 ML: 1.2 LIQUID ORAL at 12:27

## 2020-06-04 RX ADMIN — FENTANYL CITRATE 25 MCG: 50 INJECTION, SOLUTION INTRAMUSCULAR; INTRAVENOUS at 16:24

## 2020-06-04 RX ADMIN — LIDOCAINE HYDROCHLORIDE 5 ML: 10 INJECTION, SOLUTION EPIDURAL; INFILTRATION; INTRACAUDAL; PERINEURAL at 13:25

## 2020-06-04 RX ADMIN — FENTANYL CITRATE 50 MCG: 50 INJECTION, SOLUTION INTRAMUSCULAR; INTRAVENOUS at 13:53

## 2020-06-04 RX ADMIN — SODIUM CHLORIDE, SODIUM LACTATE, POTASSIUM CHLORIDE, AND CALCIUM CHLORIDE: .6; .31; .03; .02 INJECTION, SOLUTION INTRAVENOUS at 13:45

## 2020-06-04 RX ADMIN — MIDAZOLAM HYDROCHLORIDE 2 MG: 1 INJECTION, SOLUTION INTRAMUSCULAR; INTRAVENOUS at 13:25

## 2020-06-04 NOTE — INTERVAL H&P NOTE
H&P reviewed  After examining the patient I find no changes in the patients condition since the H&P had been written      Vitals:    06/04/20 1213   BP: 163/98   Pulse: 85   Resp: 20   Temp: 97 8 °F (36 6 °C)   SpO2: 95%

## 2020-06-04 NOTE — DISCHARGE INSTRUCTIONS
Postoperative Instructions Rotator Cuff Repair    Wound care  Please leave dressing on for 3 days  You may shower in 3 days after removal of dressing  Please cover incisions with band aid after shower  Therapy  No physical therapy will be started at this point  We will discuss this at your first postoperative follow-up  For now no active range of motion of the shoulder  Active range of motion of elbow, wrist and hand is allowed and encouraged     Wear your shoulder Immobilizer at all times except to shower    Weight-bearing status  No weight bearing on the left upper extremity  No lifting with left upper extremity  Medications  You may start taking your home medications as regularly directed  Narcotic medication is prescribed for you  Use the narcotic medication as needed  Do not drive while taking narcotic medications  Try to reduce your use of narcotic pain medication as soon as your comfort allows  If the pain does not require narcotic medication, you may take Tylenol and/or Celebrex and/or Gabapentin to help if you are allowed to take these medications    Emergency instructions  Give the office a call at [phone] if you experience any of the following:  o Fever greater than 101 5  o Increased drainage  o Increased pain    Ice  You should apply a bag of  ice to the right shoulder for 30 minutes at a time approximately 4 times a day until follow up     Follow-up  A follow-up appointment should have been made while scheduling surgery  If one was not scheduled, please call [phone] and schedule an appointment in 1 week with Dr Lidia Cummings

## 2020-06-05 ENCOUNTER — TELEPHONE (OUTPATIENT)
Dept: OBGYN CLINIC | Facility: HOSPITAL | Age: 50
End: 2020-06-05

## 2020-06-05 DIAGNOSIS — M75.122 COMPLETE TEAR OF LEFT ROTATOR CUFF, UNSPECIFIED WHETHER TRAUMATIC: ICD-10-CM

## 2020-06-05 RX ORDER — CEPHALEXIN 500 MG/1
500 CAPSULE ORAL EVERY 6 HOURS SCHEDULED
Qty: 4 CAPSULE | Refills: 0 | Status: SHIPPED | OUTPATIENT
Start: 2020-06-05 | End: 2020-06-06

## 2020-06-08 ENCOUNTER — TELEPHONE (OUTPATIENT)
Dept: OBGYN CLINIC | Facility: CLINIC | Age: 50
End: 2020-06-08

## 2020-06-08 NOTE — TELEPHONE ENCOUNTER
Discussed with Dr Vargas Hammond and called patient to discuss rash symptoms  Advised to take oral OTC benadryl as directed on bottle, watch for shortness of breath, difficulty breathing, throat/eye symptoms; report to ED if any previous symptoms occur  Patient allowed to take down dressings as shower being cautions to sutures and operative shoulder  Place bandaid over incisions  Patient and wife understand

## 2020-06-08 NOTE — TELEPHONE ENCOUNTER
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

## 2020-06-10 ENCOUNTER — OFFICE VISIT (OUTPATIENT)
Dept: FAMILY MEDICINE CLINIC | Facility: CLINIC | Age: 50
End: 2020-06-10
Payer: COMMERCIAL

## 2020-06-10 VITALS
HEIGHT: 70 IN | SYSTOLIC BLOOD PRESSURE: 145 MMHG | TEMPERATURE: 98.2 F | OXYGEN SATURATION: 96 % | WEIGHT: 229 LBS | HEART RATE: 96 BPM | DIASTOLIC BLOOD PRESSURE: 90 MMHG | BODY MASS INDEX: 32.78 KG/M2 | RESPIRATION RATE: 15 BRPM

## 2020-06-10 DIAGNOSIS — M75.122 COMPLETE TEAR OF LEFT ROTATOR CUFF, UNSPECIFIED WHETHER TRAUMATIC: ICD-10-CM

## 2020-06-10 DIAGNOSIS — R30.0 DYSURIA: Primary | ICD-10-CM

## 2020-06-10 DIAGNOSIS — R35.1 NOCTURIA: ICD-10-CM

## 2020-06-10 DIAGNOSIS — I10 ESSENTIAL HYPERTENSION: ICD-10-CM

## 2020-06-10 LAB
SL AMB  POCT GLUCOSE, UA: NEGATIVE
SL AMB LEUKOCYTE ESTERASE,UA: NEGATIVE
SL AMB POCT BILIRUBIN,UA: NEGATIVE
SL AMB POCT BLOOD,UA: ABNORMAL
SL AMB POCT CLARITY,UA: CLEAR
SL AMB POCT COLOR,UA: ABNORMAL
SL AMB POCT KETONES,UA: NEGATIVE
SL AMB POCT NITRITE,UA: NEGATIVE
SL AMB POCT PH,UA: 6.5
SL AMB POCT SPECIFIC GRAVITY,UA: 1.01
SL AMB POCT URINE PROTEIN: 15
SL AMB POCT UROBILINOGEN: 0.2

## 2020-06-10 PROCEDURE — 99214 OFFICE O/P EST MOD 30 MIN: CPT | Performed by: FAMILY MEDICINE

## 2020-06-10 PROCEDURE — 4004F PT TOBACCO SCREEN RCVD TLK: CPT | Performed by: FAMILY MEDICINE

## 2020-06-10 PROCEDURE — 3080F DIAST BP >= 90 MM HG: CPT | Performed by: FAMILY MEDICINE

## 2020-06-10 PROCEDURE — 81002 URINALYSIS NONAUTO W/O SCOPE: CPT | Performed by: FAMILY MEDICINE

## 2020-06-10 PROCEDURE — 3008F BODY MASS INDEX DOCD: CPT | Performed by: FAMILY MEDICINE

## 2020-06-10 PROCEDURE — 3077F SYST BP >= 140 MM HG: CPT | Performed by: FAMILY MEDICINE

## 2020-06-10 RX ORDER — TAMSULOSIN HYDROCHLORIDE 0.4 MG/1
0.4 CAPSULE ORAL
Qty: 30 CAPSULE | Refills: 5 | Status: SHIPPED | OUTPATIENT
Start: 2020-06-10 | End: 2021-07-17

## 2020-06-10 RX ORDER — CIPROFLOXACIN 500 MG/1
500 TABLET, FILM COATED ORAL EVERY 12 HOURS SCHEDULED
Qty: 14 TABLET | Refills: 0 | Status: SHIPPED | OUTPATIENT
Start: 2020-06-10 | End: 2020-06-17

## 2020-06-10 RX ORDER — DIPHENHYDRAMINE HCL 25 MG
25 TABLET ORAL EVERY 6 HOURS PRN
COMMUNITY
End: 2021-07-17

## 2020-06-12 ENCOUNTER — OFFICE VISIT (OUTPATIENT)
Dept: OBGYN CLINIC | Facility: CLINIC | Age: 50
End: 2020-06-12

## 2020-06-12 VITALS
RESPIRATION RATE: 18 BRPM | BODY MASS INDEX: 30.06 KG/M2 | DIASTOLIC BLOOD PRESSURE: 83 MMHG | WEIGHT: 210 LBS | HEART RATE: 93 BPM | SYSTOLIC BLOOD PRESSURE: 127 MMHG | HEIGHT: 70 IN

## 2020-06-12 DIAGNOSIS — S46.012S TRAUMATIC COMPLETE TEAR OF LEFT ROTATOR CUFF, SEQUELA: ICD-10-CM

## 2020-06-12 DIAGNOSIS — Z48.89 AFTERCARE FOLLOWING SURGERY: Primary | ICD-10-CM

## 2020-06-12 PROCEDURE — 99024 POSTOP FOLLOW-UP VISIT: CPT | Performed by: ORTHOPAEDIC SURGERY

## 2020-06-12 PROCEDURE — 3074F SYST BP LT 130 MM HG: CPT | Performed by: ORTHOPAEDIC SURGERY

## 2020-06-12 PROCEDURE — 3079F DIAST BP 80-89 MM HG: CPT | Performed by: ORTHOPAEDIC SURGERY

## 2020-06-17 ENCOUNTER — TELEPHONE (OUTPATIENT)
Dept: OBGYN CLINIC | Facility: HOSPITAL | Age: 50
End: 2020-06-17

## 2020-06-18 ENCOUNTER — EVALUATION (OUTPATIENT)
Dept: PHYSICAL THERAPY | Facility: CLINIC | Age: 50
End: 2020-06-18
Payer: COMMERCIAL

## 2020-06-18 DIAGNOSIS — Z48.89 AFTERCARE FOLLOWING SURGERY: ICD-10-CM

## 2020-06-18 DIAGNOSIS — S46.012S TRAUMATIC COMPLETE TEAR OF LEFT ROTATOR CUFF, SEQUELA: Primary | ICD-10-CM

## 2020-06-18 PROCEDURE — 97110 THERAPEUTIC EXERCISES: CPT

## 2020-06-18 PROCEDURE — 97161 PT EVAL LOW COMPLEX 20 MIN: CPT

## 2020-06-26 ENCOUNTER — OFFICE VISIT (OUTPATIENT)
Dept: PHYSICAL THERAPY | Facility: CLINIC | Age: 50
End: 2020-06-26
Payer: COMMERCIAL

## 2020-06-26 DIAGNOSIS — S46.012S TRAUMATIC COMPLETE TEAR OF LEFT ROTATOR CUFF, SEQUELA: Primary | ICD-10-CM

## 2020-06-26 DIAGNOSIS — Z48.89 AFTERCARE FOLLOWING SURGERY: ICD-10-CM

## 2020-06-26 PROCEDURE — 97140 MANUAL THERAPY 1/> REGIONS: CPT

## 2020-06-26 PROCEDURE — 97110 THERAPEUTIC EXERCISES: CPT

## 2020-07-02 ENCOUNTER — OFFICE VISIT (OUTPATIENT)
Dept: PHYSICAL THERAPY | Facility: CLINIC | Age: 50
End: 2020-07-02
Payer: COMMERCIAL

## 2020-07-02 DIAGNOSIS — S46.012S TRAUMATIC COMPLETE TEAR OF LEFT ROTATOR CUFF, SEQUELA: Primary | ICD-10-CM

## 2020-07-02 DIAGNOSIS — Z48.89 AFTERCARE FOLLOWING SURGERY: ICD-10-CM

## 2020-07-02 PROCEDURE — 97140 MANUAL THERAPY 1/> REGIONS: CPT

## 2020-07-02 PROCEDURE — 97110 THERAPEUTIC EXERCISES: CPT

## 2020-07-02 NOTE — PROGRESS NOTES
Daily Note     Today's date: 2020  Patient name: Grey Ba  : 1970  MRN: [id number]  Referring provider: Sloane Stallings MD  Dx:   Encounter Diagnosis     ICD-10-CM    1  Traumatic complete tear of left rotator cuff, sequela S46 012S    2  Aftercare following surgery Z48 89        Start Time: 173  Stop Time: 1546  Total time in clinic (min): 48 minutes    Subjective: Jose Carlos Quezada says that his shoulder "felt tweeky" yesterday with muscle spasms  He said that today it is better  He is not sure why as he does not think he did much more than normal        Objective: See treatment diary below      Assessment: Augustin's shoulder ROM remains significantly restricted in all planes of motion  His flexion is continues to be shy of 90 degrees with pain at end-range  Low grade inferior mobilizations were initiated today with no change in range or pain following  He will begin to increase his frequency of table slides both flexion and abduction as well as the hold at end range, aiming for at least 10 every hour, to address his ROM restriction more regularly in attempts to gain motion back  Treatment will continue to progress ROM as tolerated and per protocol  Plan: Continue per plan of care        Precautions: RTC repair protocol      Manuals           Shoulder PROM  20' 20'          Inferior glide   Gr I/II                                    Neuro Re-Ed                                                                                                        Ther Ex             Table slides flex x10 3x10 3" hold 5x10 8" hold (flex/abd)          Pendulums (AP, ML) 10 sec 30" x2 ea 30" x2 ea                                                                                        Ther Activity                                       Gait Training                                       Modalities

## 2020-07-08 ENCOUNTER — OFFICE VISIT (OUTPATIENT)
Dept: PHYSICAL THERAPY | Facility: CLINIC | Age: 50
End: 2020-07-08
Payer: COMMERCIAL

## 2020-07-08 DIAGNOSIS — Z48.89 AFTERCARE FOLLOWING SURGERY: ICD-10-CM

## 2020-07-08 DIAGNOSIS — S46.012S TRAUMATIC COMPLETE TEAR OF LEFT ROTATOR CUFF, SEQUELA: Primary | ICD-10-CM

## 2020-07-08 PROCEDURE — 97110 THERAPEUTIC EXERCISES: CPT

## 2020-07-08 PROCEDURE — 97140 MANUAL THERAPY 1/> REGIONS: CPT

## 2020-07-08 NOTE — PROGRESS NOTES
Daily Note     Today's date: 2020  Patient name: Lazara Salamanca  : 1970  MRN: [id number]  Referring provider: Mane Zendejas MD  Dx:   Encounter Diagnosis     ICD-10-CM    1  Traumatic complete tear of left rotator cuff, sequela S46 012S    2  Aftercare following surgery Z48 89        Start Time: 1149  Stop Time: 1235  Total time in clinic (min): 46 minutes    Subjective: Shavonne Francis says his shoulder feels like it is loosening up with less "tweeking " He has been doing his exercises more often, which he thinks is helping  Objective: See treatment diary below      Assessment: Ghazala L shoulder PROM is gradually improving as the restriction to flexion and abduction is further into the range  His flexion ROM was measured at 100 degrees today with abduction reaching 90 degrees  He continues to experience pain at end-range with little improvement following low grade mobilizations  Table slides was performed on a physioball today to increase the load and stretch to progress his ROM  Treatment will continue to progress ROM as tolerated per protocol  Plan: Continue per plan of care  Progress treament per protocol        Precautions: RTC repair protocol      Manuals          Shoulder PROM  20' 20' 15'         Inferior glide   Gr I/II Gr I/II                                   Neuro Re-Ed                                                                                                        Ther Ex             Table slides flex x10 3x10 3" hold 5x10 8" hold (flex/abd) On PB x15 10" hold (flx,scap, abd)         Pendulums (AP, ML) 10 sec 30" x2 ea 30" x2 ea 30" x2 ea         Pulleys     4' passive         Cane ER    x20 5" hold                                                             Ther Activity                                       Gait Training                                       Modalities

## 2020-07-10 ENCOUNTER — OFFICE VISIT (OUTPATIENT)
Dept: OBGYN CLINIC | Facility: CLINIC | Age: 50
End: 2020-07-10

## 2020-07-10 VITALS
HEART RATE: 80 BPM | DIASTOLIC BLOOD PRESSURE: 91 MMHG | SYSTOLIC BLOOD PRESSURE: 150 MMHG | BODY MASS INDEX: 30.06 KG/M2 | WEIGHT: 210 LBS | HEIGHT: 70 IN

## 2020-07-10 DIAGNOSIS — Z48.89 AFTERCARE FOLLOWING SURGERY: Primary | ICD-10-CM

## 2020-07-10 PROCEDURE — 3077F SYST BP >= 140 MM HG: CPT | Performed by: ORTHOPAEDIC SURGERY

## 2020-07-10 PROCEDURE — 99024 POSTOP FOLLOW-UP VISIT: CPT | Performed by: ORTHOPAEDIC SURGERY

## 2020-07-10 PROCEDURE — 3080F DIAST BP >= 90 MM HG: CPT | Performed by: ORTHOPAEDIC SURGERY

## 2020-07-10 NOTE — OP NOTE
OPERATIVE REPORT    Patient Name: Ace Hood    :  1970  MRN: [id number]  Pt Location: [facility] 03    Surgery Date:   2020    Surgeon(s) and Role:   Jenetta Shone, MD - Primary   Robb Sandoval PA-C - [address] [place] [address] [facility] - [facility]    Preop Diagnosis:  Large Left Rotator Cuff Tear with Retraction  Possible Biceps Suibluxation    Post-Op Diagnosis:  Large Tears of Suprapinatus and Infraspinatus with retraction to near Glenoid  No biceps Subluxation  Degenerative partial thickness tears of superior Subscapularis but insertion intact      Procedure(s) (LRB):  Left shoulder arthroscopic rotator cuff repair    Specimen(s):ll  None    Estimated Blood Loss:   Minimal    Drains:  None    Implants:   Implant Name  Lot No  LRB No  Used   SYSTEM IMPLANT Ryan Menard W/4 [address] [facility] - [id number] [facility] 14 [id number] Left 1       Anesthesia Type:   General and Regional    Operative Indications:  17-year-old male status post fall skiing onto the left shoulder in early March early February  Unable to lift shoulder overhead since that time  MRI revealed large infraspinatus and supraspinatus tears with retraction  Grade 2 fat atrophy  MRI also revealed possible subscapularis tear and biceps subluxation  Risks and benefits of left shoulder arthroscopic surgery with rotator cuff repair and possible biceps tenodesis discussed in detail with patient and he wished to proceed  Operative Findings:  Large rotator cuff tears of supraspinatus and infraspinatus with retraction to glenoid  No biceps subluxation  Some thinning and degenerative change in biceps  Mild degenerative tearing of superior fibers of supraspinatus but insertion intact  Mild glenohumeral arthritis  Degenerative changes of anterior superior and posterior labrum  Complications:   None    Procedure and Technique:  Patient's left shoulder marked in the preop area    Risks and benefits reviewed with patient and he wished to proceed  Anesthesia administered regional interscalene block for the left upper extremity  Patient taken the operating room  He received 2 g prophylactic IV Ancef  He was placed on the OR table in the supine position  General anesthesia administered  He was then placed into the beach chair position  Pad under the legs  Head in well-padded head trujillo  All pressure points checked  Left shoulder free  The left shoulder and left upper extremity were prepped and draped in the usual sterile fashion  Time-out performed  Exam under anesthesia revealed full passive range of motion and negative load shift testing anterior and posterior  Sulcus sign negative  Posterior portal established and scope introduced into the glenohumeral joint  Anterior portal established and probe introduced  Diagnostic arthroscopic findings as follows:  Large rotator cuff tears of supraspinatus and infraspinatus with retraction to glenoid  No biceps subluxation  Some thinning and degenerative change in biceps  Mild degenerative tearing of superior fibers of supraspinatus but insertion intact  Mild glenohumeral arthritis  Degenerative changes of anterior superior and posterior labrum  Normal axillary pouch  No loose bodies  Probe removed  Arthroscope repositioned into the subacromial space via a posterior lateral portal   Lateral portal status and subacromial bursectomy performed using shaver and arthro Wand  Hemostasis maintained with arthro Wand  The rotator cuff tear was large and retracted  Anteriorly there was a disconnect of the cuff from coracohumeral ligament and cable fibers were not attached to the tuberosity  Cuff was retracted to near the level of the glenoid  Rotator cuff grasper was utilized to check mobility of the cuff  Initially we could only advance the cuff to the medial aspect of the tuberosity    However after spending time working on release of adhesions with elevator and shaver on both bursal and joint side of the cuff of we were able to mobilize the cuff and be added to a point where we had significant coverage of the tuberosity that allowed for speed bridge technique double row fixation without excess tension     The tuberosity was prepared using a full-radius resector and light burring  Our 1st medial ankle over was placed percutaneously with a punch physician at the anterior medial aspect of the tuberosity near the biceps  Scorpion suture Passer was utilized to place the tapes through or just lateral to the cable fibers  Tape ends were pulled through an anterior lateral portal   A 2nd medial anchor was placed by 1 5 cm posterior to the 1st anchor at the posterior medial aspect of the tuberosity  Scorpion suture Passer utilized to place tapes from this anchor through the superior part of the infraspinatus and posterior part of the supraspinatus lateral to the cable fibers  Next 1 tape limb from each medial anchor was grasped and pulled through a cannula placed in the lateral portal   We also placed a Fiber loop through a the anterior aspect of the cuff to prevent dog-ear and brought that through the can as well  These sutures were passed through an anchor  An anterior lateral anchor position established and punch utilized to make a hole for SwiveLock  Full lock was then placed into this hole and sutures in tapes tightened and anchor screwed into bone  A 2nd  anchor placed posterior laterally  The other 2 limbs of the medial anchors were pulled to this anchor and tensioned and the anchor fully seated  Good repair of the cuff was noted and good coverage of the tuberosity noted  Shoulder drained and portals closed with nylon stitches  Dry sterile dressing applied  Patient tolerated procedure well  Shoulder immobilizer applied  No complications  Blood loss minimal   Patient went to recovery in stable condition      Luanne Hdz PA-C and Clementine Leon PA-C assisted throughout the case with arm position and exposure  I was present for the entire procedure    Patient Disposition:  PACU     SIGNATURE: Mai Alas MD  DATE: July 10, 2020  TIME: 7:43 AM      Portions of the record may have been created with voice recognition software   Occasional wrong word or "sound a like" substitutions may have occurred due to the inherent limitations of voice recognition software   Read the chart carefully and recognize, using context, where substitutions have occurred

## 2020-07-10 NOTE — PROGRESS NOTES
SUBJECTIVE  Patient is a 57-year-old male here for a follow-up after left rotator cuff repair performed 06/04/2020  Pt  States that he has a retained suture  Pt has been going to therapy and doing HEP   Pt states he has continued pain with motion  Pt has been taking Advil for pain        ROS:   General: No fever, no chills, no weight loss, no weight gain  HEENT:  No loss of hearing, no nose bleeds, no sore throat  Eyes:  No eye pain, no red eyes, no visual disturbance  Respiratory:  No cough, no shortness of breath, no wheezing  Cardiovascular:  No chest pain, no palpitations, no edema  GI: No abdominal pain, no nausea, no vomiting  Endocrine: No frequent urination, no excessive thirst  Urinary:  No dysuria, no hematuria, no incontinence  Musculoskeletal: see HPI and PE  Skin:  No rash, no wounds  Neurological:  No dizziness, no headache, no numbness  Psychiatric:  No difficulty concentrating, no depression, no suicide thoughts, no anxiety  Review of all other systems is negative    PMH:  Past Medical History:   Diagnosis Date    Anxiety     CPAP (continuous positive airway pressure) dependence     Depression     Hyperlipidemia     Hypertension     Sleep apnea        PSH:  Past Surgical History:   Procedure Laterality Date    APPENDECTOMY      CARDIAC CATHETERIZATION      KNEE SURGERY Left     [place] LAP, VENTRAL HERNIA REPAIR,REDUCIBLE N/A 1/10/2018    Procedure: LAPAROSCOPIC UMBILICAL HERNIA REPAIR WITH MESH;  Surgeon: Krista Joyner MD;  Location: [facility];  Service: General    [place] SHLDR ARTHROSCOP,SURG,W/ROTAT CUFF REPR Left 6/4/2020    Procedure: Left shoulder arthroscopic rotator cuff repair, possible biceps tenodesis vs biceps tenodesis open;  Surgeon: Regino Wood MD;  Location: [facility];  Service: Orthopedics       Medications:  Current Outpatient Medications   Medication Sig Dispense Refill    acetaminophen (TYLENOL) 650 mg CR tablet Take 1 tablet (650 mg total) by mouth every 8 (eight) hours as needed for mild pain 30 tablet 0    albuterol (VENTOLIN HFA) 90 mcg/act inhaler Inhale 2 puffs every 6 (six) hours as needed for wheezing 1 Inhaler 0    amLODIPine (NORVASC) 5 mg tablet Take 1 tablet (5 mg total) by mouth daily 30 tablet 3    aspirin 81 MG tablet Take 81 tablets by mouth daily      atorvastatin (LIPITOR) 40 mg tablet take 1 tablet by mouth daily 30 tablet 5    diphenhydrAMINE (BENADRYL) 25 mg tablet Take 25 mg by mouth every 6 (six) hours as needed for itching      gabapentin (NEURONTIN) 100 mg capsule Take 1 capsule (100 mg total) by mouth 3 (three) times a day 60 capsule 0    tamsulosin (FLOMAX) 0 4 mg Take 1 capsule (0 4 mg total) by mouth daily with dinner 30 capsule 5    venlafaxine (EFFEXOR-XR) 75 mg 24 hr capsule take 1 capsule by mouth once daily 30 capsule 1     No current facility-administered medications for this visit  Allergies: Allergies   Allergen Reactions    Betadine [Povidone Iodine] Rash       Family History:  Family History   Problem Relation Age of Onset    Hypertension Mother     Coronary artery disease Mother     Hypertension Father     Heart disease Father     Coronary artery disease Father     Coronary artery disease Brother     Hypertension Brother        Social History:  Social History     Occupational History    Not on file   Tobacco Use    Smoking status: Current Every Day Smoker     Types: Cigars    Smokeless tobacco: Never Used    Tobacco comment: rare   Substance and Sexual Activity    Alcohol use: Yes     Frequency: 2-3 times a week     Comment: rarely    Drug use: No    Sexual activity: Not on file       Physical Exam:  General :  Alert, cooperative, no distress, appears stated age  Blood pressure 150/91, pulse 80, height 5' 10" (1 778 m), weight 95 3 kg (210 lb)  Head:  Normocephalic, without obvious abnormality, atraumatic   Eyes:  Conjunctiva/corneas clear, EOM's intact,   Ears:   Both ears normal appearance, no hearing deficits  Nose: Nares normal, septum midline, no drainage    Neck: Supple,  trachea midline, no adenopathy, no tenderness, no mass   Back:   Symmetric, no curvature, ROM normal, no tenderness   Lungs:   Respirations unlabored   Chest Wall:  No tenderness or deformity   Extremities: Extremities normal, atraumatic, no cyanosis or edema      Pulses: 2+ and symmetric   Skin: Skin color, texture, turgor normal, no rashes or lesions      Neurologic: Normal           Left Shoulder Exam     Range of Motion   Passive abduction: 90   Forward flexion: 100     Comments:  Incisions clean and dry            Imaging Studies: The following imaging studies were reviewed in office today  My findings are noted  No new images reviewed    Assessment  Encounter Diagnosis   Name Primary?  Aftercare following surgery Yes         Plan:     Pt was advised that he needs to work more agressivly on ROM at therapy to help break up adhesions  Pt was advised that his motion should be greater at this time  Pt was advised that therapy can begin active assisted motion at therapy and we will progress to active motion in 2 weeks  Pt was advised to take the Advil prior to therapy to help with pain  Pt was advised to continue sling for protection  Pt will follow up in the 4 weeks

## 2020-07-13 ENCOUNTER — OFFICE VISIT (OUTPATIENT)
Dept: PHYSICAL THERAPY | Facility: CLINIC | Age: 50
End: 2020-07-13
Payer: COMMERCIAL

## 2020-07-13 DIAGNOSIS — S46.012S TRAUMATIC COMPLETE TEAR OF LEFT ROTATOR CUFF, SEQUELA: Primary | ICD-10-CM

## 2020-07-13 DIAGNOSIS — Z48.89 AFTERCARE FOLLOWING SURGERY: ICD-10-CM

## 2020-07-13 PROCEDURE — 97110 THERAPEUTIC EXERCISES: CPT

## 2020-07-13 PROCEDURE — 97140 MANUAL THERAPY 1/> REGIONS: CPT

## 2020-07-13 NOTE — PROGRESS NOTES
Daily Note     Today's date: 2020  Patient name: Leatha Reid  : 1970  MRN: [id number]  Referring provider: Dom Monson MD  Dx:   Encounter Diagnosis     ICD-10-CM    1  Traumatic complete tear of left rotator cuff, sequela S46 012S    2  Aftercare following surgery Z48 89        Start Time: 70  Stop Time: 1536  Total time in clinic (min): 50 minutes    Subjective: Nereyda Mayer says that his shoulder is stiff today  He reports that it felt looser yesterday  He states that he has been performing his PROM exercises regularly  He did not take Advil prior to his session today as recommended by his MD       Objective: See treatment diary below      Assessment: Nereyda Mayer continues to have significant pain at his limited end-range  He tolerated AAROM exercises for flexion, abduction, and ER as well as he did when performing it passively  Added self-overpressure to PROM via bridging during flexion and lumbar rotation during abduction ER  He will add cane active assisted exercises at home to increase the amount of stretch at end range to continue to address his ROM deficit  Plan: Continue per plan of care        Precautions: RTC repair protocol      Manuals         Shoulder PROM  20' 20' 15' 15' (flex w/ bridge, abd w/ LTR)        Inferior glide   Gr I/II Gr I/II Gr I/II        Long axis distraction     Gr I/II                     Neuro Re-Ed                                                                                                        Ther Ex             Table slides flex x10 3x10 3" hold 5x10 8" hold (flex/abd) On PB x15 10" hold (flx,scap, abd)         Pendulums (AP, ML) 10 sec 30" x2 ea 30" x2 ea 30" x2 ea         Pulleys     4' passive 8'        Cane ER    x20 5" hold x20 5" hold        Cane AAROM flex/abd     supine 2x10 5" hold                                               Ther Activity                                       Gait Training Modalities

## 2020-07-15 ENCOUNTER — OFFICE VISIT (OUTPATIENT)
Dept: PHYSICAL THERAPY | Facility: CLINIC | Age: 50
End: 2020-07-15
Payer: COMMERCIAL

## 2020-07-15 DIAGNOSIS — S46.012S TRAUMATIC COMPLETE TEAR OF LEFT ROTATOR CUFF, SEQUELA: Primary | ICD-10-CM

## 2020-07-15 DIAGNOSIS — Z48.89 AFTERCARE FOLLOWING SURGERY: ICD-10-CM

## 2020-07-15 PROCEDURE — 97140 MANUAL THERAPY 1/> REGIONS: CPT

## 2020-07-15 PROCEDURE — 97110 THERAPEUTIC EXERCISES: CPT

## 2020-07-15 NOTE — PROGRESS NOTES
Daily Note     Today's date: 7/15/2020  Patient name: Betzaida Grissom  : 1970  MRN: [id number]  Referring provider: Marry Velazquez MD  Dx:   Encounter Diagnosis     ICD-10-CM    1  Traumatic complete tear of left rotator cuff, sequela S46 012S    2  Aftercare following surgery Z48 89                   Subjective: Pt arrives to PT today with no sling  Pt states he continues to feel very stiff and sore  Objective: See treatment diary below      Assessment:  Pt was educated on his protocol  Pt continues to be limited with PROM seen with manual therapy  Pt would continue to benefit from PT  Plan: Continue per plan of care        Precautions: RTC repair protocol      Manuals 6/18 6/26 7/2 7/8 7/13 7/15       Shoulder PROM  20' 20' 15' 15' (flex w/ bridge, abd w/ LTR) 15'        Inferior glide   Gr I/II Gr I/II Gr I/II np       Long axis distraction     Gr I/II np                    Neuro Re-Ed                                                                                                        Ther Ex             Table slides flex x10 3x10 3" hold 5x10 8" hold (flex/abd) On PB x15 10" hold (flx,scap, abd)  On PB x 15 10"  Hold flx, sc, ap, abd        Pendulums (AP, ML) 10 sec 30" x2 ea 30" x2 ea 30" x2 ea  30" x 2        Pulleys     4' passive 8' 8'        Cane ER    x20 5" hold x20 5" hold x20 5" hold       Cane AAROM flex/abd     supine 2x10 5" hold supine 2 x 10 5" hold                                              Ther Activity                                       Gait Training                                       Modalities

## 2020-07-20 ENCOUNTER — EVALUATION (OUTPATIENT)
Dept: PHYSICAL THERAPY | Facility: CLINIC | Age: 50
End: 2020-07-20
Payer: COMMERCIAL

## 2020-07-20 DIAGNOSIS — S46.012S TRAUMATIC COMPLETE TEAR OF LEFT ROTATOR CUFF, SEQUELA: Primary | ICD-10-CM

## 2020-07-20 DIAGNOSIS — Z48.89 AFTERCARE FOLLOWING SURGERY: ICD-10-CM

## 2020-07-20 PROCEDURE — 97110 THERAPEUTIC EXERCISES: CPT

## 2020-07-20 PROCEDURE — 97140 MANUAL THERAPY 1/> REGIONS: CPT

## 2020-07-20 NOTE — PROGRESS NOTES
Daily Note     Today's date: 2020  Patient name: Salma Zelaya  : 1970  MRN: [id number]  Referring provider: Farhana Galan MD  Dx:   Encounter Diagnosis     ICD-10-CM    1  Traumatic complete tear of left rotator cuff, sequela S46 012S    2  Aftercare following surgery Z48 89        Start Time: 1200          Subjective: Pt reports moving L shoulder is painful  Pt reports compliance HEP  Objective: See treatment diary below      Assessment: PROM limitations are present in all planes with firm end feel and pain at end range  Fair tolerance to AAROM exercises secondary to pain  Verbal and tactile cuing provided in order to facilitate proper exercise form and dosage  Plan: Continue per plan of care        Precautions: RTC repair protocol      Manuals 6/18 6/26 7/2 7/8 7/13 7/15 7/20      Shoulder PROM  20' 20' 15' 15' (flex w/ bridge, abd w/ LTR) 15'  20'      Inferior glide   Gr I/II Gr I/II Gr I/II np       Long axis distraction     Gr I/II np                    Neuro Re-Ed                                                                                                        Ther Ex             Table slides flex x10 3x10 3" hold 5x10 8" hold (flex/abd) On PB x15 10" hold (flx,scap, abd)  On PB x 15 10"  Hold flx, sc, ap, abd        Pendulums (AP, ML) 10 sec 30" x2 ea 30" x2 ea 30" x2 ea  30" x 2  30"x4      Pulleys     4' passive 8' 8'  5' x2      Cane ER    x20 5" hold x20 5" hold x20 5" hold 10"x20      Cane AAROM flex/abd     supine 2x10 5" hold supine 2 x 10 5" hold supine 10" x20      Wall slides       x20      Wall ER stretch       10"x15                   Ther Activity                                       Gait Training                                       Modalities

## 2020-07-20 NOTE — PROGRESS NOTES
PT Re-Evaluation     Today's date: 2020  Patient name: Salma Zelaya  : 1970  MRN: [id number]  Referring provider: Farhana Galan MD  Dx:   Encounter Diagnosis     ICD-10-CM    1  Traumatic complete tear of left rotator cuff, sequela S46 012S    2  Aftercare following surgery Z48 89        Start Time: 1200  Stop Time: 1310  Total time in clinic (min): 70 minutes    Assessment  Assessment details: Salma Zelaya is s/p L rotator cuff repair on 2020  He continues to present with significant ROM restriction in all planes of motion  Current PROM is 122 degrees flexion, 100 degrees abduction, and 30 degrees of ER  This ROM limitation is secondary to pain and tissue restriction  He reports being compliant with his HEP and has been pushing his range as tolerated  He was instructed on a supine flexion low-load long duration stretch using a heavy resistance theraband to increase his total end-range time to further address his ROM restriction  He verbalized understanding of stretch and importance of remaining diligent in performing his HEP to help progress his ROM  He is allowed to begin AROM at the end of this week (2020) per surgeon's last visit  At this point, he is making slow progress towards his PT goals especially with regards to his ROM  He will continue to benefit from skilled PT to provide exercises, neuromuscular reeducation, manual techniques, and modalities as deemed necessary in order to achieve his goals and return him to his prior level of function  Impairments: abnormal or restricted ROM, activity intolerance, impaired physical strength, lacks appropriate home exercise program and pain with function    Symptom irritability: moderateUnderstanding of Dx/Px/POC: good   Prognosis: good    Goals  STGs in 4 weeks  1  Patient will be independent with HEP (MET)  2  Patient will have full PROM without interference from L shoulder pain (ONGOING)    3  Patient will have strong and painless resisted tests of L shoulder for increased strength (ONGOING)  LTGs in 8 weeks  1  Patient will be able to place a 2# weight on a shelf at overhead without interference from L shoulder pain (ONGOING)  2  Patient will be able to sleep through the night without interference from L shoulder pain (ONGOING)  3  Patient will have full AROM without interference from L shoulder pain (ONGOING)  Plan  Patient would benefit from: skilled physical therapy  Planned modality interventions: cryotherapy and TENS  Planned therapy interventions: joint mobilization, manual therapy, massage, neuromuscular re-education, patient education, strengthening, stretching, therapeutic activities, therapeutic exercise and home exercise program  Frequency: 2x week  Duration in weeks: 8  Treatment plan discussed with: patient        Subjective Evaluation    History of Present Illness  Date of surgery: 2020  Mechanism of injury: Ashely Julian states that he is doing his exercises several times a day as he is able to with his busy schedule  He feels that it is gradually loosening up with the exercises and stretches, but he continues to experience significant pain with them  He reports that taking Advil before his exercises and PT sessions does slightly help    Pain  Current pain ratin  At best pain ratin  Quality: dull ache and sharp  Relieving factors: ice and medications          Objective     Tenderness     Additional Tenderness Details  Posterior shoulder and scapula are tender to palpation    Passive Range of Motion   Left Shoulder   Flexion: 122 degrees with pain  Abduction: 100 degrees with pain  External rotation 0°: 30 degrees with pain    Right Shoulder   Normal passive range of motion             Precautions: RTC repair protocol      Manuals 6/18 6/26 7/2 7/8 7/13 7/15 7/20      Shoulder PROM  20' 20' 15' 15' (flex w/ bridge, abd w/ LTR) 15'  20'      Inferior glide   Gr I/II Gr I/II Gr I/II np       Long axis distraction Gr I/II np                    Neuro Re-Ed                                                                                                        Ther Ex             Table slides flex x10 3x10 3" hold 5x10 8" hold (flex/abd) On PB x15 10" hold (flx,scap, abd)  On PB x 15 10"  Hold flx, sc, ap, abd        Pendulums (AP, ML) 10 sec 30" x2 ea 30" x2 ea 30" x2 ea  30" x 2  30"x4      Pulleys     4' passive 8' 8'  5'x2      Cane ER    x20 5" hold x20 5" hold x20 5" hold 10"x20      Cane AAROM flex/abd     supine 2x10 5" hold supine 2 x 10 5" hold supine 10"x20      Wall slides       x20      Wall ER       10"x15      Flexion TERT       supine BTBx3'      Ther Activity                                       Gait Training                                       Modalities

## 2020-07-22 ENCOUNTER — OFFICE VISIT (OUTPATIENT)
Dept: PHYSICAL THERAPY | Facility: CLINIC | Age: 50
End: 2020-07-22
Payer: COMMERCIAL

## 2020-07-22 DIAGNOSIS — S46.012S TRAUMATIC COMPLETE TEAR OF LEFT ROTATOR CUFF, SEQUELA: Primary | ICD-10-CM

## 2020-07-22 DIAGNOSIS — Z48.89 AFTERCARE FOLLOWING SURGERY: ICD-10-CM

## 2020-07-22 PROCEDURE — 97140 MANUAL THERAPY 1/> REGIONS: CPT

## 2020-07-22 PROCEDURE — 97110 THERAPEUTIC EXERCISES: CPT

## 2020-07-22 NOTE — PROGRESS NOTES
Daily Note     Today's date: 2020  Patient name: Eduardo Saeed  : 1970  MRN: [id number]  Referring provider: Marie Monteiro MD  Dx:   Encounter Diagnosis     ICD-10-CM    1  Traumatic complete tear of left rotator cuff, sequela S46 012S    2  Aftercare following surgery Z48 89        Start Time: 1211  Stop Time: 1306  Total time in clinic (min): 55 minutes    Subjective: Clau Gilliam arrived almost 30 minutes late to his appointment time due to meetings running late  He states that his exercises are going well and he feels like he is gradually getting more range  He is beginning to feel less pain at his end range  Objective: See treatment diary below      Assessment: Ghazala LANZA shoulder ROM has seemed to improve since last session as feels like the restriction is further along in his range since providing wall slides and low load long duration stretch for his HEP  He tolerates pushing his PROM well and reports less discomfort at end-range than previously reported  Treatment will continue to progress ROM, adding AROM next session  Discussed with him the importance of coming to his appointments on time to ensure he gets the care he needs for the recovery of his shoulder  Offered to try to find other appointment times if there were different times that would be better for him, but he verbalized that he will be sure to leave meetings on time to get here for the scheduled appointments  Plan: Continue per plan of care        Precautions: RTC repair protocol      Manuals 6/18 6/26 7/2 7/8 7/13 7/15 7/20 7/22     Shoulder PROM  20' 20' 15' 15' (flex w/ bridge, abd w/ LTR) 15'  20' 20' w/ flex w/ bridge, abd w/ LTR     Inferior glide   Gr I/II Gr I/II Gr I/II np  Gr I/II     Long axis distraction     Gr I/II np                    Neuro Re-Ed                                                                                                        Ther Ex             Table slides flex x10 3x10 3" hold 5x10 8" hold (flex/abd) On PB x15 10" hold (flx,scap, abd)  On PB x 15 10"  Hold flx, sc, ap, abd        Pendulums (AP, ML) 10 sec 30" x2 ea 30" x2 ea 30" x2 ea  30" x 2  30"x4 30" x4     Pulleys     4' passive 8' 8'  5' x2 8'     Cane ER    x20 5" hold x20 5" hold x20 5" hold 10"x20 10" x20     Cane AAROM flex/abd     supine 2x10 5" hold supine 2 x 10 5" hold supine 10" x20 Supine 10" x20     Wall slides       x20 x20     Wall ER stretch       10"x15 10" x20                  Ther Activity                                       Gait Training                                       Modalities

## 2020-07-25 DIAGNOSIS — I10 ESSENTIAL HYPERTENSION: ICD-10-CM

## 2020-07-27 ENCOUNTER — OFFICE VISIT (OUTPATIENT)
Dept: PHYSICAL THERAPY | Facility: CLINIC | Age: 50
End: 2020-07-27
Payer: COMMERCIAL

## 2020-07-27 DIAGNOSIS — S46.012S TRAUMATIC COMPLETE TEAR OF LEFT ROTATOR CUFF, SEQUELA: Primary | ICD-10-CM

## 2020-07-27 DIAGNOSIS — Z48.89 AFTERCARE FOLLOWING SURGERY: ICD-10-CM

## 2020-07-27 PROCEDURE — 97140 MANUAL THERAPY 1/> REGIONS: CPT

## 2020-07-27 PROCEDURE — 97110 THERAPEUTIC EXERCISES: CPT

## 2020-07-27 RX ORDER — AMLODIPINE BESYLATE 5 MG/1
TABLET ORAL
Qty: 30 TABLET | Refills: 3 | Status: SHIPPED | OUTPATIENT
Start: 2020-07-27 | End: 2020-11-30

## 2020-07-27 NOTE — PROGRESS NOTES
Daily Note     Today's date: 2020  Patient name: Oskar Akers  : 1970  MRN: [id number]  Referring provider: Dyana Garcia MD  Dx:   Encounter Diagnosis     ICD-10-CM    1  Traumatic complete tear of left rotator cuff, sequela S46 012S    2  Aftercare following surgery Z48 89        Start Time: 1143  Stop Time: 2564  Total time in clinic (min): 39 minutes    Subjective: Betzaida Bingham says he feels like his shoulder is gradually able to go further into his range and that it is becoming less painful to do  He states that he gets a little "creeped out" when he gets close to the end of available range due to feeling the tightness and the "tendons rolling "    Objective: See treatment diary below    Pt asked for his BP to be checked as it has been running high:  Vitals after pulleys: 149/96 mmHg  Vitals after 3 mins rest: 160/82 mmHg    He states he is following up with his MD regarding his BP  Assessment: Augustin's L shoulder ROM is gradually improving in all planes of motion  [place] was progressed today from a supine position to standing now that his range is increasing  Low grade mobilizations during today's session assisted in decreasing his pain with PROM of flexion and abduction to allow a stretch into a greater range  Plan to add AROM and light RTC isometrics next session to progress his ROM and begin re-activating his cuff muscles  Plan: Continue per plan of care        Precautions: RTC repair protocol      Manuals 6/18 6/26 7/2 7/8 7/13 7/15 7/20 7/22 7/27    Shoulder PROM  20' 20' 15' 15' (flex w/ bridge, abd w/ LTR) 15'  20' 20' w/ flex w/ bridge, abd w/ LTR 10'    Inferior glide   Gr I/II Gr I/II Gr I/II np  Gr I/II Gr I/II    Long axis distraction     Gr I/II np                    Neuro Re-Ed                                                                                                        Ther Ex             Table slides flex x10 3x10 3" hold 5x10 8" hold (flex/abd) On PB x15 10" hold (flx,scap, abd)  On PB x 15 10"  Hold flx, sc, ap, abd        Pendulums (AP, ML) 10 sec 30" x2 ea 30" x2 ea 30" x2 ea  30" x 2  30"x4 30" x4 4x30"    Pulleys     4' passive 8' 8'  5' x2 8' 6'    Cane ER    x20 5" hold x20 5" hold x20 5" hold 10"x20 10" x20 10"x20    Cane AAROM flex/abd     supine 2x10 5" hold supine 2 x 10 5" hold supine 10" x20 Supine 10" x20 supine 10"x20; standing abd 2x10    Wall slides       x20 x20 x20    Wall ER stretch       10"x15 10" x20     Cane ext         2x10    Ther Activity                                       Gait Training                                       Modalities

## 2020-07-29 ENCOUNTER — OFFICE VISIT (OUTPATIENT)
Dept: PHYSICAL THERAPY | Facility: CLINIC | Age: 50
End: 2020-07-29
Payer: COMMERCIAL

## 2020-07-29 DIAGNOSIS — Z48.89 AFTERCARE FOLLOWING SURGERY: ICD-10-CM

## 2020-07-29 DIAGNOSIS — S46.012S TRAUMATIC COMPLETE TEAR OF LEFT ROTATOR CUFF, SEQUELA: Primary | ICD-10-CM

## 2020-07-29 PROCEDURE — 97110 THERAPEUTIC EXERCISES: CPT

## 2020-07-29 PROCEDURE — 97140 MANUAL THERAPY 1/> REGIONS: CPT

## 2020-07-29 NOTE — PROGRESS NOTES
Daily Note     Today's date: 2020  Patient name: Thao Garcia  : 1970  MRN: [id number]  Referring provider: Bruno Molina MD  Dx:   Encounter Diagnosis     ICD-10-CM    1  Traumatic complete tear of left rotator cuff, sequela S46 012S    2  Aftercare following surgery Z48 89        Start Time: 1159  Stop Time: 1233  Total time in clinic (min): 34 minutes    Subjective: Taylor Mayorga arrived 15 minutes late for his appointment  He states that his shoulder felt tired yesterday after being in meetings all day  He thinks he may have been doing a lot of talking with his arms  Objective: See treatment diary below      Assessment: Ghazala LANZA shoulder continues to have significantly limited PROM in all planes of motion secondary to pain and restriction at end-range  Following low grade mobilizations, passive forward flexion was more tolerable as pain did not start as early in the range  Introduction of AROM and light RTC isometrics were tolerated well with minor discomfort  These exercises indicated that he has significant weakness of the involved muscles  Treatment will continue to progress ROM both actively and passively as tolerated in preparation for strengthening  Plan: Continue per plan of care        Precautions: RTC repair protocol      Manuals 6/18 6/26 7/2 7/8 7/13 7/15 7/20 7/22 7/27 7/29   Shoulder PROM  20' 20' 15' 15' (flex w/ bridge, abd w/ LTR) 15'  20' 20' w/ flex w/ bridge, abd w/ LTR 10' 10'   Inferior glide   Gr I/II Gr I/II Gr I/II np  Gr I/II Gr I/II Gr I/II   Long axis distraction     Gr I/II np                    Neuro Re-Ed             RTC carola          2x10 abd/ER/IR                                                                                 Ther Ex             Table slides flex x10 3x10 3" hold 5x10 8" hold (flex/abd) On PB x15 10" hold (flx,scap, abd)  On PB x 15 10"  Hold flx, sc, ap, abd        Pendulums (AP, ML) 10 sec 30" x2 ea 30" x2 ea 30" x2 ea  30" x 2  30"x4 30" x4 4x30" 4x30"   Pulleys     4' passive 8' 8'  5' x2 8' 6' 6'   Cane ER    x20 5" hold x20 5" hold x20 5" hold 10"x20 10" x20 10"x20    Cane AAROM flex/abd     supine 2x10 5" hold supine 2 x 10 5" hold supine 10" x20 Supine 10" x20 supine 10"x20; standing abd 2x10    Wall slides       x20 x20 x20 x20   Wall ER stretch       10"x15 10" x20     Cane ext         2x10    Seated thoracic ext          nv   Shoulder AROM          2x10 FE supine   IR BTB stretch          5x 30"                             Ther Activity                                       Gait Training                                       Modalities

## 2020-08-05 ENCOUNTER — APPOINTMENT (OUTPATIENT)
Dept: PHYSICAL THERAPY | Facility: CLINIC | Age: 50
End: 2020-08-05
Payer: COMMERCIAL

## 2020-08-06 ENCOUNTER — APPOINTMENT (OUTPATIENT)
Dept: PHYSICAL THERAPY | Facility: CLINIC | Age: 50
End: 2020-08-06
Payer: COMMERCIAL

## 2020-08-07 ENCOUNTER — OFFICE VISIT (OUTPATIENT)
Dept: PHYSICAL THERAPY | Facility: CLINIC | Age: 50
End: 2020-08-07
Payer: COMMERCIAL

## 2020-08-07 ENCOUNTER — APPOINTMENT (EMERGENCY)
Dept: CT IMAGING | Facility: HOSPITAL | Age: 50
End: 2020-08-07
Payer: COMMERCIAL

## 2020-08-07 ENCOUNTER — HOSPITAL ENCOUNTER (EMERGENCY)
Facility: HOSPITAL | Age: 50
Discharge: HOME | End: 2020-08-07
Attending: EMERGENCY MEDICINE
Payer: COMMERCIAL

## 2020-08-07 VITALS
WEIGHT: 230 LBS | RESPIRATION RATE: 17 BRPM | HEART RATE: 75 BPM | BODY MASS INDEX: 34.07 KG/M2 | TEMPERATURE: 98.2 F | SYSTOLIC BLOOD PRESSURE: 167 MMHG | DIASTOLIC BLOOD PRESSURE: 102 MMHG | HEIGHT: 69 IN | OXYGEN SATURATION: 97 %

## 2020-08-07 DIAGNOSIS — S46.012S TRAUMATIC COMPLETE TEAR OF LEFT ROTATOR CUFF, SEQUELA: Primary | ICD-10-CM

## 2020-08-07 DIAGNOSIS — K52.9 ENTERITIS: Primary | ICD-10-CM

## 2020-08-07 DIAGNOSIS — Z48.89 AFTERCARE FOLLOWING SURGERY: ICD-10-CM

## 2020-08-07 LAB
ALBUMIN SERPL BCP-MCNC: 4.3 G/DL (ref 3.5–5)
ALP SERPL-CCNC: 94 U/L (ref 46–116)
ALT SERPL W P-5'-P-CCNC: 58 U/L (ref 12–78)
ANION GAP SERPL CALCULATED.3IONS-SCNC: 9 MMOL/L (ref 4–13)
AST SERPL W P-5'-P-CCNC: 26 U/L (ref 5–45)
BASOPHILS # BLD AUTO: 0.02 THOUSANDS/ΜL (ref 0–0.1)
BASOPHILS NFR BLD AUTO: 0 % (ref 0–1)
BILIRUB SERPL-MCNC: 0.9 MG/DL (ref 0.2–1)
BUN SERPL-MCNC: 13 MG/DL (ref 5–25)
CALCIUM SERPL-MCNC: 9.6 MG/DL (ref 8.3–10.1)
CHLORIDE SERPL-SCNC: 101 MMOL/L (ref 100–108)
CO2 SERPL-SCNC: 29 MMOL/L (ref 21–32)
CREAT SERPL-MCNC: 1.29 MG/DL (ref 0.6–1.3)
EOSINOPHIL # BLD AUTO: 0.04 THOUSAND/ΜL (ref 0–0.61)
EOSINOPHIL NFR BLD AUTO: 0 % (ref 0–6)
ERYTHROCYTE [DISTWIDTH] IN BLOOD BY AUTOMATED COUNT: 13.2 % (ref 11.6–15.1)
GFR SERPL CREATININE-BSD FRML MDRD: 63 ML/MIN/1.73SQ M
GLUCOSE SERPL-MCNC: 121 MG/DL (ref 65–140)
HCT VFR BLD AUTO: 45.2 % (ref 36.5–49.3)
HGB BLD-MCNC: 14.4 G/DL (ref 12–17)
IMM GRANULOCYTES # BLD AUTO: 0.04 THOUSAND/UL (ref 0–0.2)
IMM GRANULOCYTES NFR BLD AUTO: 0 % (ref 0–2)
LIPASE SERPL-CCNC: 139 U/L (ref 73–393)
LYMPHOCYTES # BLD AUTO: 1.6 THOUSANDS/ΜL (ref 0.6–4.47)
LYMPHOCYTES NFR BLD AUTO: 17 % (ref 14–44)
MCH RBC QN AUTO: 27.1 PG (ref 26.8–34.3)
MCHC RBC AUTO-ENTMCNC: 31.9 G/DL (ref 31.4–37.4)
MCV RBC AUTO: 85 FL (ref 82–98)
MONOCYTES # BLD AUTO: 0.55 THOUSAND/ΜL (ref 0.17–1.22)
MONOCYTES NFR BLD AUTO: 6 % (ref 4–12)
NEUTROPHILS # BLD AUTO: 7.26 THOUSANDS/ΜL (ref 1.85–7.62)
NEUTS SEG NFR BLD AUTO: 77 % (ref 43–75)
NRBC BLD AUTO-RTO: 0 /100 WBCS
PLATELET # BLD AUTO: 268 THOUSANDS/UL (ref 149–390)
PMV BLD AUTO: 9.5 FL (ref 8.9–12.7)
POTASSIUM SERPL-SCNC: 3.8 MMOL/L (ref 3.5–5.3)
PROT SERPL-MCNC: 8 G/DL (ref 6.4–8.2)
RBC # BLD AUTO: 5.31 MILLION/UL (ref 3.88–5.62)
SODIUM SERPL-SCNC: 139 MMOL/L (ref 136–145)
WBC # BLD AUTO: 9.51 THOUSAND/UL (ref 4.31–10.16)

## 2020-08-07 PROCEDURE — 96360 HYDRATION IV INFUSION INIT: CPT

## 2020-08-07 PROCEDURE — G1004 CDSM NDSC: HCPCS

## 2020-08-07 PROCEDURE — 80053 COMPREHEN METABOLIC PANEL: CPT | Performed by: PHYSICIAN ASSISTANT

## 2020-08-07 PROCEDURE — 83690 ASSAY OF LIPASE: CPT | Performed by: PHYSICIAN ASSISTANT

## 2020-08-07 PROCEDURE — 97110 THERAPEUTIC EXERCISES: CPT

## 2020-08-07 PROCEDURE — 97112 NEUROMUSCULAR REEDUCATION: CPT

## 2020-08-07 PROCEDURE — 96361 HYDRATE IV INFUSION ADD-ON: CPT

## 2020-08-07 PROCEDURE — 36415 COLL VENOUS BLD VENIPUNCTURE: CPT | Performed by: PHYSICIAN ASSISTANT

## 2020-08-07 PROCEDURE — 99284 EMERGENCY DEPT VISIT MOD MDM: CPT | Performed by: PHYSICIAN ASSISTANT

## 2020-08-07 PROCEDURE — 97140 MANUAL THERAPY 1/> REGIONS: CPT

## 2020-08-07 PROCEDURE — 85025 COMPLETE CBC W/AUTO DIFF WBC: CPT | Performed by: PHYSICIAN ASSISTANT

## 2020-08-07 PROCEDURE — 74177 CT ABD & PELVIS W/CONTRAST: CPT

## 2020-08-07 PROCEDURE — 99284 EMERGENCY DEPT VISIT MOD MDM: CPT

## 2020-08-07 RX ORDER — DICYCLOMINE HCL 20 MG
20 TABLET ORAL 2 TIMES DAILY
Qty: 20 TABLET | Refills: 0 | Status: SHIPPED | OUTPATIENT
Start: 2020-08-07 | End: 2021-07-17

## 2020-08-07 RX ORDER — ONDANSETRON 4 MG/1
4 TABLET, FILM COATED ORAL EVERY 8 HOURS PRN
Qty: 12 TABLET | Refills: 0 | Status: SHIPPED | OUTPATIENT
Start: 2020-08-07 | End: 2021-07-17

## 2020-08-07 RX ORDER — DICYCLOMINE HCL 20 MG
20 TABLET ORAL ONCE
Status: COMPLETED | OUTPATIENT
Start: 2020-08-07 | End: 2020-08-07

## 2020-08-07 RX ADMIN — SODIUM CHLORIDE 1000 ML: 0.9 INJECTION, SOLUTION INTRAVENOUS at 20:02

## 2020-08-07 RX ADMIN — DICYCLOMINE HYDROCHLORIDE 20 MG: 20 TABLET ORAL at 22:35

## 2020-08-07 RX ADMIN — IOHEXOL 100 ML: 350 INJECTION, SOLUTION INTRAVENOUS at 20:50

## 2020-08-07 NOTE — PROGRESS NOTES
Daily Note     Today's date: 2020  Patient name: Fidelina Whitney  : 1970  MRN: [id number]  Referring provider: Ej Solitario MD  Dx:   Encounter Diagnosis     ICD-10-CM    1  Traumatic complete tear of left rotator cuff, sequela  S46 012S    2  Aftercare following surgery  Z48 89        Start Time: 1030  Stop Time: 1115  Total time in clinic (min): 45 minutes    Subjective: Ingrid Tabor states that the shoulder is loosening up and not hurting as much with using his motion  He reports being able to put deodorant on his opposite arm without provocation of pain  He notes that some days the shoulder is stiffer than other days  Objective: See treatment diary below      Assessment: ROM measured today at flexion 127 degrees, abduction 110 degrees, and ER of 43 degrees  He remains limited both passively and actively secondary to pain at end-range and restriction  The pain at end-range today was significantly improved than previously seen  He tolerated AAROM in standing and AROM exercises in supine well with minor reports of soreness and fatigue  He needed regular cuing during RTC isometrics to not push too hard and only go to slight discomfort as a way to begin working on activation of these muscles in preparation for increased load when strengthening is allowed per protocol  Treatment will continue to progress ROM and strength as per protocol  Plan: Continue per plan of care        Precautions: RTC repair protocol      Manuals 8/7 6/26 7/2 7/8 7/13 7/15 7/20 7/22 7/27 7/29   Shoulder PROM 10' 20' 20' 15' 15' (flex w/ bridge, abd w/ LTR) 15'  20' 20' w/ flex w/ bridge, abd w/ LTR 10' 10'   Inferior glide Gr I/II  Gr I/II Gr I/II Gr I/II np  Gr I/II Gr I/II Gr I/II   Long axis distraction     Gr I/II np                    Neuro Re-Ed             RTC carola 2x10 abd/ER/IR         2x10 abd/ER/IR   Scap retractions x20            Bwd shoulder circles x20            Rhythmic stabilization Ther Ex             Table slides flex  3x10 3" hold 5x10 8" hold (flex/abd) On PB x15 10" hold (flx,scap, abd)  On PB x 15 10"  Hold flx, sc, ap, abd        Pendulums (AP, ML)  30" x2 ea 30" x2 ea 30" x2 ea  30" x 2  30"x4 30" x4 4x30" 4x30"   Pulleys  3'/3'   4' passive 8' 8'  5' x2 8' 6' 6'   Cane ER standing 5"x30   x20 5" hold x20 5" hold x20 5" hold 10"x20 10" x20 10"x20    Cane AAROM flex/abd Standing ABD 5" x30    supine 2x10 5" hold supine 2 x 10 5" hold supine 10" x20 Supine 10" x20 supine 10"x20; standing abd 2x10    Wall slides x30      x20 x20 x20 x20   Wall ER stretch       10"x15 10" x20     Cane ext         2x10    Seated thoracic ext Half roll 10x5" hold         nv   Shoulder AROM supine 2x10         2x10 FE supine   IR BTB stretch          5x 30"                             Ther Activity                                       Gait Training                                       Modalities

## 2020-08-08 NOTE — DISCHARGE INSTRUCTIONS
Take Bentyl as prescribed  Take Zofran as needed for nausea  Drink plenty of fluids and eat a bland diet  Follow-up with your family doctor  Return to ER with any worsening symptoms

## 2020-08-08 NOTE — ED PROVIDER NOTES
History  Chief Complaint   Patient presents with    Abdominal Pain     pt reports he thinks he has a bout of food poisoning; pt reports pressure in abdomen, swelling, nausea, sweating; he states it feels like diverticulitis     43yo male with a history of hypertension, hyperlipidemia, and anxiety presenting for evaluation of abdominal pain and distention  Symptoms initially started 2 days ago after eating [employer]  He initially attributed his symptoms to food poisoning  Today, his symptoms were acutely worsening while at work and he decided to seek medical attention  He reports that his abdomen feels distended and feels constipated  He also complains of nausea but denies vomiting  Patient previously had diverticulitis and states it feels similar  Patient denies fevers, chills, chest pain, shortness of breath, dysuria, hematuria  Previous abdominal surgeries include a ventral hernia repair  History provided by:  Patient   used: No    Abdominal Pain   Pain location:  Generalized  Pain quality: cramping and sharp    Pain radiates to:  Does not radiate  Pain severity:  Moderate  Onset quality:  Gradual  Duration:  2 days  Timing:  Constant  Progression:  Worsening  Chronicity:  New  Context: suspicious food intake    Context: not sick contacts and not trauma    Relieved by:  Nothing  Worsened by:  Nothing  Ineffective treatments:  OTC medications (Gas-X)  Associated symptoms: chills, constipation and nausea    Associated symptoms: no belching, no chest pain, no cough, no diarrhea, no dysuria, no fatigue, no fever, no hematuria, no shortness of breath, no sore throat and no vomiting        Prior to Admission Medications   Prescriptions Last Dose Informant Patient Reported?  Taking?   acetaminophen (TYLENOL) 650 mg CR tablet  Self No No   Sig: Take 1 tablet (650 mg total) by mouth every 8 (eight) hours as needed for mild pain   albuterol (VENTOLIN HFA) 90 mcg/act inhaler  Self No No   Sig: Inhale 2 puffs every 6 (six) hours as needed for wheezing   amLODIPine (NORVASC) 5 mg tablet   No No   Sig: take 1 tablet by mouth daily   aspirin 81 MG tablet  Self Yes No   Sig: Take 81 tablets by mouth daily   atorvastatin (LIPITOR) 40 mg tablet  Self No No   Sig: take 1 tablet by mouth daily   diphenhydrAMINE (BENADRYL) 25 mg tablet  Self Yes No   Sig: Take 25 mg by mouth every 6 (six) hours as needed for itching   gabapentin (NEURONTIN) 100 mg capsule  Self No No   Sig: Take 1 capsule (100 mg total) by mouth 3 (three) times a day   tamsulosin (FLOMAX) 0 4 mg  Self No No   Sig: Take 1 capsule (0 4 mg total) by mouth daily with dinner   venlafaxine (EFFEXOR-XR) 75 mg 24 hr capsule  Self No No   Sig: take 1 capsule by mouth once daily      Facility-Administered Medications: None       Past Medical History:   Diagnosis Date    Anxiety     CPAP (continuous positive airway pressure) dependence     Depression     Hyperlipidemia     Hypertension     Sleep apnea        Past Surgical History:   Procedure Laterality Date    APPENDECTOMY      CARDIAC CATHETERIZATION      KNEE SURGERY Left     [place] LAP, VENTRAL HERNIA REPAIR,REDUCIBLE N/A 1/10/2018    Procedure: LAPAROSCOPIC UMBILICAL HERNIA REPAIR WITH MESH;  Surgeon: Lottie Jones MD;  Location: [facility];  Service: General    [place] SHLDR ARTHROSCOP,SURG,W/ROTAT CUFF REPR Left 6/4/2020    Procedure: Left shoulder arthroscopic rotator cuff repair, possible biceps tenodesis vs biceps tenodesis open;  Surgeon: Davis Beach MD;  [facility];  Service: Orthopedics       Family History   Problem Relation Age of Onset    Hypertension Mother     Coronary artery disease Mother     Hypertension Father     Heart disease Father     Coronary artery disease Father     Coronary artery disease Brother     Hypertension Brother      I have reviewed and agree with the history as documented      E-Cigarette/Vaping    E-Cigarette Use Never User E-Cigarette/Vaping Substances     Social History     Tobacco Use    Smoking status: Current Some Day Smoker     Types: Cigars    Smokeless tobacco: Never Used    Tobacco comment: rare   Substance Use Topics    Alcohol use: Yes     Frequency: 2-3 times a week     Comment: rarely    Drug use: No       Review of Systems   Constitutional: Positive for chills  Negative for fatigue and fever  HENT: Negative for congestion, drooling and sore throat  Eyes: Negative for discharge and redness  Respiratory: Negative for cough and shortness of breath  Cardiovascular: Negative for chest pain and palpitations  Gastrointestinal: Positive for abdominal pain, constipation and nausea  Negative for diarrhea and vomiting  Genitourinary: Negative for dysuria and hematuria  Musculoskeletal: Negative for neck pain and neck stiffness  Skin: Negative for color change and rash  Neurological: Negative for light-headedness and headaches  Psychiatric/Behavioral: Negative for confusion  All other systems reviewed and are negative  Physical Exam  Physical Exam  Vitals signs and nursing note reviewed  Constitutional:       General: He is not in acute distress  Appearance: He is well-developed  He is not diaphoretic  Comments: Non-toxic appearing, conversant    HENT:      Head: Normocephalic and atraumatic  Right Ear: External ear normal       Left Ear: External ear normal    Eyes:      General: No scleral icterus  Right eye: No discharge  Left eye: No discharge  Conjunctiva/sclera: Conjunctivae normal    Neck:      Musculoskeletal: Normal range of motion and neck supple  Cardiovascular:      Rate and Rhythm: Normal rate and regular rhythm  Heart sounds: Normal heart sounds  No murmur  Pulmonary:      Effort: Pulmonary effort is normal  No respiratory distress  Breath sounds: Normal breath sounds  No stridor  No wheezing or rales     Abdominal:      General: Bowel sounds are normal  There is distension  Palpations: Abdomen is soft  Tenderness: There is generalized abdominal tenderness  There is no guarding or rebound  Negative signs include Ball's sign  Musculoskeletal: Normal range of motion  General: No deformity  Skin:     General: Skin is warm and dry  Neurological:      Mental Status: He is alert  He is not disoriented  GCS: GCS eye subscore is 4  GCS verbal subscore is 5  GCS motor subscore is 6     Psychiatric:         Behavior: Behavior normal          Vital Signs  ED Triage Vitals [08/07/20 1929]   Temperature Pulse Respirations Blood Pressure SpO2   98 2 °F (36 8 °C) 94 20 149/100 98 %      Temp Source Heart Rate Source Patient Position - Orthostatic VS BP Location FiO2 (%)   Oral Monitor Lying Left arm --      Pain Score       6           Vitals:    08/07/20 1929 08/07/20 2239   BP: 149/100 (!) 167/102   Pulse: 94 75   Patient Position - Orthostatic VS: Lying Sitting         Visual Acuity      ED Medications  Medications   sodium chloride 0 9 % bolus 1,000 mL (0 mL Intravenous Stopped 8/7/20 2145)   iohexol (OMNIPAQUE) 350 MG/ML injection (MULTI-DOSE) 100 mL (100 mL Intravenous Given 8/7/20 2050)   dicyclomine (BENTYL) tablet 20 mg (20 mg Oral Given 8/7/20 2235)       Diagnostic Studies  Results Reviewed     Procedure Component Value Units Date/Time    Comprehensive metabolic panel [[id number]] Collected:  08/07/20 1959    Lab Status:  Final result Specimen:  Blood from Arm, Right Updated:  08/07/20 2028     Sodium 139 mmol/L      Potassium 3 8 mmol/L      Chloride 101 mmol/L      CO2 29 mmol/L      ANION GAP 9 mmol/L      BUN 13 mg/dL      Creatinine 1 29 mg/dL      Glucose 121 mg/dL      Calcium 9 6 mg/dL      AST 26 U/L      ALT 58 U/L      Alkaline Phosphatase 94 U/L      Total Protein 8 0 g/dL      Albumin 4 3 g/dL      Total Bilirubin 0 90 mg/dL      eGFR 63 ml/min/1 73sq m     Narrative:       National Kidney Disease Foundation guidelines for Chronic Kidney Disease (CKD):     Stage 1 with normal or high GFR (GFR > 90 mL/min/1 73 square meters)    Stage 2 Mild CKD (GFR = 60-89 mL/min/1 73 square meters)    Stage 3A Moderate CKD (GFR = 45-59 mL/min/1 73 square meters)    Stage 3B Moderate CKD (GFR = 30-44 mL/min/1 73 square meters)    Stage 4 Severe CKD (GFR = 15-29 mL/min/1 73 square meters)    Stage 5 End Stage CKD (GFR <15 mL/min/1 73 square meters)  Note: GFR calculation is accurate only with a steady state creatinine    Lipase [[id number]]  (Normal) Collected:  08/07/20 1959    Lab Status:  Final result Specimen:  Blood from Arm, Right Updated:  08/07/20 2028     Lipase 139 u/L     CBC and differential [[id number]]  (Abnormal) Collected:  08/07/20 1959    Lab Status:  Final result Specimen:  Blood from Arm, Right Updated:  08/07/20 2009     WBC 9 51 Thousand/uL      RBC 5 31 Million/uL      Hemoglobin 14 4 g/dL      Hematocrit 45 2 %      MCV 85 fL      MCH 27 1 pg      MCHC 31 9 g/dL      RDW 13 2 %      MPV 9 5 fL      Platelets 068 Thousands/uL      nRBC 0 /100 WBCs      Neutrophils Relative 77 %      Immat GRANS % 0 %      Lymphocytes Relative 17 %      Monocytes Relative 6 %      Eosinophils Relative 0 %      Basophils Relative 0 %      Neutrophils Absolute 7 26 Thousands/µL      Immature Grans Absolute 0 04 Thousand/uL      Lymphocytes Absolute 1 60 Thousands/µL      Monocytes Absolute 0 55 Thousand/µL      Eosinophils Absolute 0 04 Thousand/µL      Basophils Absolute 0 02 Thousands/µL                  CT abdomen pelvis with contrast   Final Result by Radha Apodaca MD (08/07 2150)      Findings suggestive of mild enteritis  No evidence of bowel obstruction, colitis or diverticulitis  Workstation performed: [id number]                    Procedures  Procedures         ED Course  ED Course as of Aug 07 2250   Fri Aug 07, 2020   2206 CT reveals enteritis  Will trial PO challenge and re-evaluate  US AUDIT      Most Recent Value   Initial Alcohol Screen: US AUDIT-C    1  How often do you have a drink containing alcohol? 4 Filed at: 08/07/2020 1930   2  How many drinks containing alcohol do you have on a typical day you are drinking? 1 Filed at: 08/07/2020 1930   3a  Male UNDER 65: How often do you have five or more drinks on one occasion? 0 Filed at: 08/07/2020 1930   3b  FEMALE Any Age, or MALE 65+: How often do you have 4 or more drinks on one occassion? 0 Filed at: 08/07/2020 1930   Audit-C Score  5 Filed at: 08/07/2020 1930                  FLORENTIN/DAST-10      Most Recent Value   How many times in the past year have you    Used an illegal drug or used a prescription medication for non-medical reasons? Never Filed at: 08/07/2020 1930                                MDM  Number of Diagnoses or Management Options  Enteritis: new and requires workup  Diagnosis management comments: 45yo male with a history of HTN and HLD presenting for abdominal pain, distention, and nausea x 2 days  Symptoms started after eating [employer]  Symptoms feel similar to his previous bout of diverticulitis  Patient is well appearing on exam  Abdomen appears distended without signs of peritonitis  Differential diagnosis includes but is not limited to: gastritis, GERD, pancreatitis, hepatitis, cholecystitis, cholelithiasis, colitis, diverticulitis, appendicitis, mesenteric adenitis, UTI, pyelonephritis, SBO, constipation, kidney stone, musculoskeletal, nonspecific abdominal pain  Initial ED plan: Check abdominal labs and CT abdomen  IV fluid bolus and re-evaluate  Final assessment: Labs overall unremarkable  White count, electrolytes, renal function, LFTs, lipase normal  CT abdomen reveals mild enteritis  No evidence of obstruction or diverticulitis  Patient is well appearing on re-evaluation and tolerated PO challenge without difficulty  No indication for admission at this time   Will trial course of Bentyl for his symptoms  Advised close PCP follow-up  ED return precautions discussed  Patient expressed understanding and is agreeable to plan  Patient discharged in stable condition  Amount and/or Complexity of Data Reviewed  Clinical lab tests: ordered and reviewed  Tests in the radiology section of CPT®: ordered and reviewed  Review and summarize past medical records: yes  Independent visualization of images, tracings, or specimens: yes    Risk of Complications, Morbidity, and/or Mortality  Presenting problems: moderate  Diagnostic procedures: moderate  Management options: moderate    Patient Progress  Patient progress: stable        Disposition  Final diagnoses:   Enteritis     Time reflects when diagnosis was documented in both MDM as applicable and the Disposition within this note     Time User Action Codes Description Comment    8/7/2020 10:38 PM Tomy Salinas, [address] [facility] Rachid Add [K52 9] Enteritis       ED Disposition     ED Disposition Condition Date/Time Comment    Discharge Stable Fri Aug 7, 2020 10:38 PM Lencho Osborne discharge to home/self care              Follow-up Information     Follow up With Specialties Details Why Contact Info Additional Information    Octavio Mealing, DO Family Medicine Schedule an appointment as soon as possible for a visit   [address] [place] [address] [address]  [address] [facility] [address]       [address] [facility] Emergency Department Emergency Medicine  If symptoms worsen [address] [facility] [address] [phone] [place] [facility], [address], [place], [address]          Discharge Medication List as of 8/7/2020 10:39 PM      START taking these medications    Details   dicyclomine (BENTYL) 20 mg tablet Take 1 tablet (20 mg total) by mouth 2 (two) times a day, Starting Fri 8/7/2020, Normal      ondansetron (ZOFRAN) 4 mg tablet Take 1 tablet (4 mg total) by mouth every 8 (eight) hours as needed for nausea or vomiting, Starting Fri 8/7/2020, Normal CONTINUE these medications which have NOT CHANGED    Details   acetaminophen (TYLENOL) 650 mg CR tablet Take 1 tablet (650 mg total) by mouth every 8 (eight) hours as needed for mild pain, Starting Thu 6/4/2020, Normal      albuterol (VENTOLIN HFA) 90 mcg/act inhaler Inhale 2 puffs every 6 (six) hours as needed for wheezing, Starting Tue 7/10/2018, Normal      amLODIPine (NORVASC) 5 mg tablet take 1 tablet by mouth daily, Normal      aspirin 81 MG tablet Take 81 tablets by mouth daily, Starting Tue 2/17/2015, Historical Med      atorvastatin (LIPITOR) 40 mg tablet take 1 tablet by mouth daily, Normal      diphenhydrAMINE (BENADRYL) 25 mg tablet Take 25 mg by mouth every 6 (six) hours as needed for itching, Historical Med      gabapentin (NEURONTIN) 100 mg capsule Take 1 capsule (100 mg total) by mouth 3 (three) times a day, Starting Thu 6/4/2020, Normal      tamsulosin (FLOMAX) 0 4 mg Take 1 capsule (0 4 mg total) by mouth daily with dinner, Starting Wed 6/10/2020, Normal      venlafaxine (EFFEXOR-XR) 75 mg 24 hr capsule take 1 capsule by mouth once daily, Normal           No discharge procedures on file      PDMP Review       Value Time User    PDMP Reviewed  Yes 6/4/2020  5:36 PM Brinda Nix MD          ED Provider  Electronically Signed by           Tawny Solorio PA-C  08/07/20 1692

## 2020-08-11 ENCOUNTER — OFFICE VISIT (OUTPATIENT)
Dept: OBGYN CLINIC | Facility: CLINIC | Age: 50
End: 2020-08-11

## 2020-08-11 VITALS
DIASTOLIC BLOOD PRESSURE: 91 MMHG | TEMPERATURE: 98.2 F | HEIGHT: 69 IN | HEART RATE: 70 BPM | WEIGHT: 230 LBS | BODY MASS INDEX: 34.07 KG/M2 | SYSTOLIC BLOOD PRESSURE: 161 MMHG

## 2020-08-11 DIAGNOSIS — Z98.890 S/P ROTATOR CUFF REPAIR: Primary | ICD-10-CM

## 2020-08-11 PROCEDURE — 3008F BODY MASS INDEX DOCD: CPT | Performed by: FAMILY MEDICINE

## 2020-08-11 PROCEDURE — 99024 POSTOP FOLLOW-UP VISIT: CPT | Performed by: ORTHOPAEDIC SURGERY

## 2020-08-11 PROCEDURE — 3077F SYST BP >= 140 MM HG: CPT | Performed by: ORTHOPAEDIC SURGERY

## 2020-08-11 PROCEDURE — 3080F DIAST BP >= 90 MM HG: CPT | Performed by: ORTHOPAEDIC SURGERY

## 2020-08-12 ENCOUNTER — OFFICE VISIT (OUTPATIENT)
Dept: PHYSICAL THERAPY | Facility: CLINIC | Age: 50
End: 2020-08-12
Payer: COMMERCIAL

## 2020-08-12 DIAGNOSIS — Z48.89 AFTERCARE FOLLOWING SURGERY: ICD-10-CM

## 2020-08-12 DIAGNOSIS — S46.012S TRAUMATIC COMPLETE TEAR OF LEFT ROTATOR CUFF, SEQUELA: Primary | ICD-10-CM

## 2020-08-12 PROCEDURE — 97140 MANUAL THERAPY 1/> REGIONS: CPT

## 2020-08-12 PROCEDURE — 97112 NEUROMUSCULAR REEDUCATION: CPT

## 2020-08-12 NOTE — PROGRESS NOTES
Daily Note     Today's date: 2020  Patient name: Pinkie Hashimoto  : 1970  MRN: [id number]  Referring provider: eCm Strauss MD  Dx:   Encounter Diagnosis     ICD-10-CM    1  Traumatic complete tear of left rotator cuff, sequela  S46 012S    2  Aftercare following surgery  Z48 89        Start Time: 945          Subjective: Virgia Heimlich arrived 15 minutes late for his appointment today  He said that he saw the surgeon yesterday and that the surgeon said his shoulder is progressing nicely considering how significant the tear was  He reports that the exercises are going well at home  Objective: See treatment diary below      Assessment: Augustin's shoulder ROM is gradually improving in all planes with less pain and discomfort at end-range compared to what was reported during previous sessions  He reported minor pinching in his shoulder with passive ABD that was eased with a slight distraction force  He tolerated the introduction of periscapular exercises using theraband without any report of increased pain or discomfort in the shoulder  Treatment will continue to progress ROM and gradually introduce strengthening exercises when allowed per protocol  Plan: Continue per plan of care        Precautions: RTC repair protocol      Manuals 8/7 8/12 7/2 7/8 7/13 7/15 7/20 7/22 7/27 7/29   Shoulder PROM 10' 12' w/ light distraction for abd 20' 15' 15' (flex w/ bridge, abd w/ LTR) 15'  20' 20' w/ flex w/ bridge, abd w/ LTR 10' 10'   Inferior glide Gr I/II  Gr I/II Gr I/II Gr I/II np  Gr I/II Gr I/II Gr I/II   Long axis distraction     Gr I/II np                    Neuro Re-Ed             RTC carola 2x10 abd/ER/IR 2x10 abd/ER/IR        2x10 abd/ER/IR   Scap retractions x20 x20           Bwd shoulder circles x20            Rhythmic stabilization             TB rows/ pull downs  Red x30 ea                                     Ther Ex             Table slides flex   5x10 8" hold (flex/abd) On PB x15 10" hold (flx,scap, abd)  On PB x 15 10"  Hold flx, sc, ap, abd        Pendulums (AP, ML)   30" x2 ea 30" x2 ea  30" x 2  30"x4 30" x4 4x30" 4x30"   Pulleys  3'/3' 3'/3'  4' passive 8' 8'  5' x2 8' 6' 6'   Cane ER standing 5"x30   x20 5" hold x20 5" hold x20 5" hold 10"x20 10" x20 10"x20    Cane AAROM flex/abd Standing ABD 5" x30    supine 2x10 5" hold supine 2 x 10 5" hold supine 10" x20 Supine 10" x20 supine 10"x20; standing abd 2x10    Wall slides x30      x20 x20 x20 x20   Wall ER stretch       10"x15 10" x20     Cane ext         2x10    Seated thoracic ext Half roll 10x5" hold         nv   Shoulder AROM supine 2x10         2x10 FE supine   IR BTB stretch  5x30"        5x 30"                             Ther Activity                                       Gait Training                                       Modalities

## 2020-08-13 NOTE — PROGRESS NOTES
SUBJECTIVE  PO 6/4/20 left shoulder repair of large retracted supraspinatus and infraspinatus rotator cuff tears (retracted to glenoid)  Making slow steady progress with PT  Still some shoulder discomfort  No longer in sling        ROS:   General: No fever, no chills, no weight loss, no weight gain  HEENT:  No loss of hearing, no nose bleeds, no sore throat  Eyes:  No eye pain, no red eyes, no visual disturbance  Respiratory:  No cough, no shortness of breath, no wheezing  Cardiovascular:  No chest pain, no palpitations, no edema  GI: No abdominal pain, no nausea, no vomiting  Endocrine: No frequent urination, no excessive thirst  Urinary:  No dysuria, no hematuria, no incontinence  Musculoskeletal: see HPI and PE  Skin:  No rash, no wounds  Neurological:  No dizziness, no headache, no numbness  Psychiatric:  No difficulty concentrating, no depression, no suicide thoughts, no anxiety  Review of all other systems is negative    PMH:  Past Medical History:   Diagnosis Date    Anxiety     CPAP (continuous positive airway pressure) dependence     Depression     Hyperlipidemia     Hypertension     Sleep apnea        PSH:  Past Surgical History:   Procedure Laterality Date    APPENDECTOMY      CARDIAC CATHETERIZATION      KNEE SURGERY Left     [place] LAP, VENTRAL HERNIA REPAIR,REDUCIBLE N/A 1/10/2018    Procedure: LAPAROSCOPIC UMBILICAL HERNIA REPAIR WITH MESH;  Surgeon: Claudia Hester MD;  Location: [facility];  Service: General    [place] SHLDR ARTHROSCOP,SURG,W/ROTAT CUFF REPR Left 6/4/2020    Procedure: Left shoulder arthroscopic rotator cuff repair, possible biceps tenodesis vs biceps tenodesis open;  Surgeon: Lynda Hernandez MD;  [facility];  Service: Orthopedics       Medications:  Current Outpatient Medications   Medication Sig Dispense Refill    acetaminophen (TYLENOL) 650 mg CR tablet Take 1 tablet (650 mg total) by mouth every 8 (eight) hours as needed for mild pain 30 tablet 0    albuterol (VENTOLIN HFA) 90 mcg/act inhaler Inhale 2 puffs every 6 (six) hours as needed for wheezing 1 Inhaler 0    amLODIPine (NORVASC) 5 mg tablet take 1 tablet by mouth daily 30 tablet 3    aspirin 81 MG tablet Take 81 tablets by mouth daily      atorvastatin (LIPITOR) 40 mg tablet take 1 tablet by mouth daily 30 tablet 5    dicyclomine (BENTYL) 20 mg tablet Take 1 tablet (20 mg total) by mouth 2 (two) times a day 20 tablet 0    diphenhydrAMINE (BENADRYL) 25 mg tablet Take 25 mg by mouth every 6 (six) hours as needed for itching      gabapentin (NEURONTIN) 100 mg capsule Take 1 capsule (100 mg total) by mouth 3 (three) times a day 60 capsule 0    ondansetron (ZOFRAN) 4 mg tablet Take 1 tablet (4 mg total) by mouth every 8 (eight) hours as needed for nausea or vomiting 12 tablet 0    tamsulosin (FLOMAX) 0 4 mg Take 1 capsule (0 4 mg total) by mouth daily with dinner 30 capsule 5    venlafaxine (EFFEXOR-XR) 75 mg 24 hr capsule take 1 capsule by mouth once daily 30 capsule 1     No current facility-administered medications for this visit  Allergies:   Allergies   Allergen Reactions    Betadine [Povidone Iodine] Rash       Family History:  Family History   Problem Relation Age of Onset    Hypertension Mother     Coronary artery disease Mother     Hypertension Father     Heart disease Father     Coronary artery disease Father     Coronary artery disease Brother     Hypertension Brother        Social History:  Social History     Occupational History    Not on file   Tobacco Use    Smoking status: Current Some Day Smoker     Types: Cigars    Smokeless tobacco: Never Used    Tobacco comment: rare   Substance and Sexual Activity    Alcohol use: Yes     Frequency: 2-3 times a week     Comment: rarely    Drug use: No    Sexual activity: Not on file       Physical Exam:  General :  Alert, cooperative, no distress, appears stated age  Blood pressure 161/91, pulse 70, temperature 98 2 °F (36 8 °C), height 5' 9" (1 753 m), weight 104 kg (230 lb)  Head:  Normocephalic, without obvious abnormality, atraumatic   Eyes:  Conjunctiva/corneas clear, EOM's intact,   Ears: Both ears normal appearance, no hearing deficits  Nose: Nares normal, septum midline, no drainage    Neck: Supple,  trachea midline, no adenopathy, no tenderness, no mass   Back:   Symmetric, no curvature, ROM normal, no tenderness   Lungs:   Respirations unlabored   Chest Wall:  No tenderness or deformity   Extremities: Extremities normal, atraumatic, no cyanosis or edema      Pulses: 2+ and symmetric   Skin: Skin color, texture, turgor normal, no rashes or lesions      Neurologic: Normal           Left Shoulder Exam     Range of Motion   Active abduction: 110   Passive abduction: 140   Extension: 50   Forward flexion: 150   Internal rotation 90 degrees: 70     Muscle Strength   Abduction: 4/5   Internal rotation: 5/5   External rotation: 4/5     Comments:  Pain at end range of motion            Imaging Studies:     No new imaging studies   Assessment  No diagnosis found        Plan:  Continue PT and home exercise program  F/U 4 weeks

## 2020-08-14 ENCOUNTER — OFFICE VISIT (OUTPATIENT)
Dept: PHYSICAL THERAPY | Facility: CLINIC | Age: 50
End: 2020-08-14
Payer: COMMERCIAL

## 2020-08-14 DIAGNOSIS — Z48.89 AFTERCARE FOLLOWING SURGERY: ICD-10-CM

## 2020-08-14 DIAGNOSIS — S46.012S TRAUMATIC COMPLETE TEAR OF LEFT ROTATOR CUFF, SEQUELA: Primary | ICD-10-CM

## 2020-08-14 PROCEDURE — 97110 THERAPEUTIC EXERCISES: CPT

## 2020-08-14 PROCEDURE — 97140 MANUAL THERAPY 1/> REGIONS: CPT

## 2020-08-14 NOTE — PROGRESS NOTES
Daily Note     Today's date: 2020  Patient name: Betzaida Grissom  : 1970  MRN: [id number]  Referring provider: Marry Velazquez MD  Dx:   Encounter Diagnosis     ICD-10-CM    1  Traumatic complete tear of left rotator cuff, sequela  S46 012S    2  Aftercare following surgery  Z48 89                   Subjective: Esa Estevez arrived 15 minutes late for his appointment today  Pt states he is feeling pretty good today  Objective: See treatment diary below      Assessment: Pt continues to be limited wtih ROM see with self stretch and PROM  Pt protocol was reviewed with him today  Pt demonstrates good scapular retraction seen with tband rows and extensions  Treatment will continue to progress ROM and gradually introduce strengthening exercises when allowed per protocol  Plan: Continue per plan of care        Precautions: RTC repair protocol      Manuals 8/7 8/12 8/14   7/13 7/15 7/20 7/22 7/27 7/29   Shoulder PROM 10' 12' w/ light distraction for abd 8' CF   15' (flex w/ bridge, abd w/ LTR) 15'  20' 20' w/ flex w/ bridge, abd w/ LTR 10' 10'   Inferior glide Gr I/II    Gr I/II np  Gr I/II Gr I/II Gr I/II   Long axis distraction     Gr I/II np                    Neuro Re-Ed             RTC carola 2x10 abd/ER/IR 2x10 abd/ER/IR 2  x10 abd/ER/IR       2x10 abd/ER/IR   Scap retractions x20 x20 20x  Home          Bwd shoulder circles x20            Rhythmic stabilization             TB rows/ pull downs  Red x30 ea Red x30 ea                                     Ther Ex             Table slides flex      On PB x 15 10"  Hold flx, sc, ap, abd        Pendulums (AP, ML)      30" x 2  30"x4 30" x4 4x30" 4x30"   Pulleys  3'/3' 3'/3' 2'/2'   8' 8'  5' x2 8' 6' 6'   Cane ER standing 5"x30  standing 5" x 30   x20 5" hold x20 5" hold 10"x20 10" x20 10"x20    Cane AAROM flex/abd Standing ABD 5" x30  standing ABD 5" x 30   supine 2x10 5" hold supine 2 x 10 5" hold supine 10" x20 Supine 10" x20 supine 10"x20; standing abd 2x10    Wall slides x30 30x  30 x     x20 x20 x20 x20   Wall ER stretch       10"x15 10" x20     Cane ext         2x10    Seated thoracic ext Half roll 10x5" hold         nv   Shoulder AROM supine 2x10         2x10 FE supine   IR BTB stretch  5x30" 5 x 30"        5x 30"                             Ther Activity                                       Gait Training                                       Modalities

## 2020-08-18 ENCOUNTER — OFFICE VISIT (OUTPATIENT)
Dept: PHYSICAL THERAPY | Facility: CLINIC | Age: 50
End: 2020-08-18
Payer: COMMERCIAL

## 2020-08-18 DIAGNOSIS — Z48.89 AFTERCARE FOLLOWING SURGERY: ICD-10-CM

## 2020-08-18 DIAGNOSIS — S46.012S TRAUMATIC COMPLETE TEAR OF LEFT ROTATOR CUFF, SEQUELA: Primary | ICD-10-CM

## 2020-08-18 PROCEDURE — 97110 THERAPEUTIC EXERCISES: CPT | Performed by: PHYSICAL THERAPIST

## 2020-08-18 PROCEDURE — 97140 MANUAL THERAPY 1/> REGIONS: CPT | Performed by: PHYSICAL THERAPIST

## 2020-08-18 PROCEDURE — 97112 NEUROMUSCULAR REEDUCATION: CPT | Performed by: PHYSICAL THERAPIST

## 2020-08-18 NOTE — PROGRESS NOTES
Daily Note     Today's date: 2020  Patient name: Amalia Valdez  : 1970  MRN: [id number]  Referring provider: Shereen Sánchez MD  Dx:   Encounter Diagnosis     ICD-10-CM    1  Traumatic complete tear of left rotator cuff, sequela  S46 012S    2  Aftercare following surgery  Z48 89                   Subjective: Patient stated no pain prior to treatment session  Objective: See treatment diary below      Assessment: Patient demonstrated moderate pain with manual shoulder PROM; added doorway Pec stretch secondary to patient c/o difficulty with ER ROM in abduction  Plan: Continue per plan of care  Precautions: RTC repair protocol      Manuals 8/7 8/12 8/14  8/18 7/13 7/15 7/20 7/22 7/27 7/29   Shoulder PROM 10' 12' w/ light distraction for abd 8' CF  KK 15' (flex w/ bridge, abd w/ LTR) 15'  20' 20' w/ flex w/ bridge, abd w/ LTR 10' 10'   Inferior glide Gr I/II    Gr I/II np  Gr I/II Gr I/II Gr I/II   Long axis distraction     Gr I/II np                    Neuro Re-Ed             RTC carola 2x10 abd/ER/IR 2x10 abd/ER/IR 2  x10 abd/ER/IR 2x10 abd/ER/IR      2x10 abd/ER/IR   Scap retractions x20 x20 20x  Home          Bwd shoulder circles x20            Rhythmic stabilization             TB rows/ pull downs  Red x30 ea Red x30 ea  GTB x30 ea                                     Ther Ex             Table slides flex      On PB x 15 10"  Hold flx, sc, ap, abd        Pendulums (AP, ML)      30" x 2  30"x4 30" x4 4x30" 4x30"   Pulleys  3'/3' 3'/3' 2'/2'  3'/3' 8' 8'  5' x2 8' 6' 6'   Cane ER standing 5"x30  standing 5" x 30  Stand 5"x30 x20 5" hold x20 5" hold 10"x20 10" x20 10"x20    Cane AAROM flex/abd Standing ABD 5" x30  standing ABD 5" x 30  Stand abd 5"x30 supine 2x10 5" hold supine 2 x 10 5" hold supine 10" x20 Supine 10" x20 supine 10"x20; standing abd 2x10    Wall slides x30 30x  30 x  30x   x20 x20 x20 x20   Wall ER stretch       10"x15 10" x20     Cane ext         2x10    Seated thoracic ext Half roll 10x5" hold         nv   Shoulder AROM supine 2x10         2x10 FE supine   IR BTB stretch  5x30" 5 x 30"  5 x 30"       5x 30"   Doorway Pec  stretch    10x10"                      Ther Activity                                       Gait Training                                       Modalities

## 2020-08-21 ENCOUNTER — APPOINTMENT (OUTPATIENT)
Dept: PHYSICAL THERAPY | Facility: CLINIC | Age: 50
End: 2020-08-21
Payer: COMMERCIAL

## 2020-08-25 ENCOUNTER — APPOINTMENT (OUTPATIENT)
Dept: PHYSICAL THERAPY | Facility: CLINIC | Age: 50
End: 2020-08-25
Payer: COMMERCIAL

## 2020-08-26 ENCOUNTER — OFFICE VISIT (OUTPATIENT)
Dept: PHYSICAL THERAPY | Facility: CLINIC | Age: 50
End: 2020-08-26
Payer: COMMERCIAL

## 2020-08-26 DIAGNOSIS — Z48.89 AFTERCARE FOLLOWING SURGERY: ICD-10-CM

## 2020-08-26 DIAGNOSIS — S46.012S TRAUMATIC COMPLETE TEAR OF LEFT ROTATOR CUFF, SEQUELA: Primary | ICD-10-CM

## 2020-08-26 PROCEDURE — 97140 MANUAL THERAPY 1/> REGIONS: CPT

## 2020-08-26 PROCEDURE — 97112 NEUROMUSCULAR REEDUCATION: CPT

## 2020-08-26 PROCEDURE — 97110 THERAPEUTIC EXERCISES: CPT

## 2020-08-26 PROCEDURE — 97530 THERAPEUTIC ACTIVITIES: CPT

## 2020-08-26 NOTE — PROGRESS NOTES
PT Re-Evaluation     Today's date: 2020  Patient name: Fidelina Whitney  : 1970  MRN: [id number]  Referring provider: Ej Solitario MD  Dx:   Encounter Diagnosis     ICD-10-CM    1  Traumatic complete tear of left rotator cuff, sequela  S46 012S    2  Aftercare following surgery  Z48 89                   Assessment  Assessment details: Fidelina Whitney is s/p L rotator cuff repair on 2020  He continues to present with ROM restriction in all planes of motion both passively and actively  Current A/PROM is about 145 degrees flexion, 140 degrees abduction, and 40 degrees of ER  This ROM limitation is secondary to pain and tissue restriction with thoracic and shoulder hiking compensations with active motion  Abduction, adduction, IR, and ER resisted tests continue to have weakness with minor reports  He tolerated addition of light PREs for L RTC muscles well with no increase in pain and appropriate report of muscle fatigue  He will monitor his response to these new exercises and report back next session prior to them being added to his HEP  He is progressing slower than expected with regards to ROM and has yet to meet his short-term ROM goal, but continues to make gradual progress with PT  Re-emphasized the importance of consistently attending his PT sessions on time as well as continued performance of HEP and he verbalized understanding  At this point, he is still making slow progress with PT and would benefit from continued skilled PT to progress his ROM and strengthening to return him to his PLOF  Impairments: abnormal or restricted ROM, activity intolerance, impaired physical strength, lacks appropriate home exercise program and pain with function    Symptom irritability: moderateUnderstanding of Dx/Px/POC: good   Prognosis: good    Goals  STGs in 4 weeks  1  Patient will be independent with HEP (MET)  2  Patient will have full PROM without interference from L shoulder pain (ONGOING)    3  Patient will have strong and painless resisted tests of L shoulder for increased strength (ONGOING)  LTGs in 8 weeks  1  Patient will be able to place a 2# weight on a shelf at overhead without interference from L shoulder pain (ONGOING)  2  Patient will be able to sleep through the night without interference from L shoulder pain (MET)  3  Patient will have full AROM without interference from L shoulder pain (ONGOING)  Plan  Patient would benefit from: skilled physical therapy  Planned modality interventions: cryotherapy and TENS  Planned therapy interventions: joint mobilization, manual therapy, massage, neuromuscular re-education, patient education, strengthening, stretching, therapeutic activities, therapeutic exercise and home exercise program  Frequency: 2x week  Duration in weeks: 4  Treatment plan discussed with: patient        Subjective Evaluation    History of Present Illness  Date of surgery: 2020  Mechanism of injury: Jonelle Kocher states that he is having some posterior shoulder pain that started yesterday  He notes that it is aching at rest, but is not aware of doing anything new or different to the shoulder  He feels that his shoulder is gradually improving in ROM  He reports to lifting some light objects at home occasionally     Pain  Current pain ratin  At best pain ratin  At worst pain ratin  Quality: dull ache and discomfort  Relieving factors: ice and medications          Objective     Tenderness     Additional Tenderness Details  Posterior shoulder and scapula are tender to palpation    Active Range of Motion   Left Shoulder   Flexion: 140 degrees with pain  Abduction: 134 degrees with pain  External rotation 0°: 41 degrees with pain  Internal rotation BTB: lumbar with pain    Passive Range of Motion   Left Shoulder   Flexion: 146 degrees with pain  Abduction: 141 degrees with pain  External rotation 0°: 45 degrees with pain    Right Shoulder   Normal passive range of motion    Strength/Myotome Testing     Left Shoulder     Planes of Motion   Flexion: 5   Extension: 5   Abduction: 4 (minor pain)   Adduction: 4 (minor pain)   External rotation at 0°: 4 (minor pain)   Internal rotation at 0°: 4 (minor pain)     Isolated Muscles   Biceps: 5   Triceps: 5              Precautions: RTC repair protocol      Manuals 8/7 8/12 8/14  8/18 8/26 7/15 7/20 7/22 7/27 7/29   Shoulder PROM 10' 12' w/ light distraction for abd 8' CF  KK DF, flex w/ bridge, abd w/ LTR 15'  20' 20' w/ flex w/ bridge, abd w/ LTR 10' 10'   Inferior glide Gr I/II     np  Gr I/II Gr I/II Gr I/II   Long axis distraction      np                    Neuro Re-Ed             RTC carola 2x10 abd/ER/IR 2x10 abd/ER/IR 2  x10 abd/ER/IR 2x10 abd/ER/IR home     2x10 abd/ER/IR   Scap retractions x20 x20 20x  Home          Bwd shoulder circles x20            Rhythmic stabilization     5x30"        TB rows/ pull downs  Red x30 ea Red x30 ea  GTB x30 ea   GTB x30 ea        Scap relocation/ asst     FE 2x10        Serratus punches     x30        Ther Ex             Table slides flex      On PB x 15 10"  Hold flx, sc, ap, abd        Pendulums (AP, ML)      30" x 2  30"x4 30" x4 4x30" 4x30"   Pulleys  3'/3' 3'/3' 2'/2'  3'/3' 3'/3' 8'  5' x2 8' 6' 6'   Cane ER standing 5"x30  standing 5" x 30  Stand 5"x30 x30 5" hold x20 5" hold 10"x20 10" x20 10"x20    Cane AAROM flex/abd Standing ABD 5" x30  standing ABD 5" x 30;  Stand abd 5"x30 Stand abd x30 5", supine flex 2#   supine 2 x 10 5" hold supine 10" x20 Supine 10" x20 supine 10"x20; standing abd 2x10    Wall slides x30 30x  30 x  30x x30 w/ lift off  x20 x20 x20 x20   Wall ER stretch       10"x15 10" x20     Cane ext         2x10    Seated thoracic ext Half roll 10x5" hold    x10 5" hold     nv   Shoulder AROM supine 2x10         2x10 FE supine   IR BTB stretch  5x30" 5 x 30"  5 x 30"  5x30"     5x 30"   Doorway Pec  stretch    10x10"         Supine pec stretch on roll     10x10"        TB IR/ER/ABD     3x10         Ther Activity             Place cone on shelf OH     2x10                     Gait Training                                       Modalities

## 2020-08-28 ENCOUNTER — OFFICE VISIT (OUTPATIENT)
Dept: PHYSICAL THERAPY | Facility: CLINIC | Age: 50
End: 2020-08-28
Payer: COMMERCIAL

## 2020-08-28 DIAGNOSIS — Z48.89 AFTERCARE FOLLOWING SURGERY: ICD-10-CM

## 2020-08-28 DIAGNOSIS — S46.012S TRAUMATIC COMPLETE TEAR OF LEFT ROTATOR CUFF, SEQUELA: Primary | ICD-10-CM

## 2020-08-28 PROCEDURE — 97110 THERAPEUTIC EXERCISES: CPT

## 2020-08-28 PROCEDURE — 97140 MANUAL THERAPY 1/> REGIONS: CPT

## 2020-08-28 PROCEDURE — 97112 NEUROMUSCULAR REEDUCATION: CPT

## 2020-08-28 NOTE — PROGRESS NOTES
Daily Note     Today's date: 2020  Patient name: Augustine Liu  : 1970  MRN: [id number]  Referring provider: Nasir Kuzn MD  Dx:   Encounter Diagnosis     ICD-10-CM    1  Traumatic complete tear of left rotator cuff, sequela  S46 012S    2  Aftercare following surgery  Z48 89                   Subjective: Ashely Julian states that his shoulder was a "good sore" after introduction of other exercises last session  Objective: See treatment diary below      Assessment: Tolerated RTC PREs today with expected report of muscle soreness and fatigue  His ROM continues to be limited secondary to pain and restrictions with several compensations including trunk movement and shoulder hiking  He will continue to primarily focus on ROM exercises at home to work into his limited range frequently every day to keep gradually progressing it  Treatment will progress ROM, strength, and stability as tolerated  Plan: Continue per plan of care  Precautions: RTC repair protocol      Manuals    Shoulder PROM 10' 12' w/ light distraction for abd 8' CF  KK DF, flex w/ bridge, abd w/ LTR DF 20' 20' w/ flex w/ bridge, abd w/ LTR 10' 10'   Inferior glide Gr I/II     DF Gr III/IV  Gr I/II Gr I/II Gr I/II   Long axis distraction                          Neuro Re-Ed             RTC carola 2x10 abd/ER/IR 2x10 abd/ER/IR 2  x10 abd/ER/IR 2x10 abd/ER/IR home     2x10 abd/ER/IR   Scap retractions x20 x20 20x  Home          Bwd shoulder circles x20            Rhythmic stabilization     5x30"        TB rows/ pull downs  Red x30 ea Red x30 ea  GTB x30 ea   GTB x30 ea GTB x30 ea       Scap relocation/ asst     FE 2x10        Serratus punches     x30        Ther Ex             Table slides flex             Pendulums (AP, ML)       30"x4 30" x4 4x30" 4x30"   Pulleys  3'/3' 3'/3' 2'/2'  3'/3' 3'/3' 3'/3' 5' x2 8' 6' 6'   Cane ER standing 5"x30  standing 5" x 30  Stand 5"x30 x30 5" hold  10"x20 10" x20 10"x20    Cane AAROM flex/abd Standing ABD 5" x30  standing ABD 5" x 30;  Stand abd 5"x30 Stand abd x30 5", supine flex 2#    supine 10" x20 Supine 10" x20 supine 10"x20; standing abd 2x10    Wall slides x30 30x  30 x  30x x30 w/ lift off x30 w/ lift off x20 x20 x20 x20   Wall ER stretch       10"x15 10" x20     Cane ext         2x10    Seated thoracic ext Half roll 10x5" hold    x10 5" hold     nv   Shoulder AROM supine 2x10         2x10 FE supine   IR BTB stretch  5x30" 5 x 30"  5 x 30"  5x30" 10x10"    5x 30"   Doorway Pec  stretch    10x10"  10x10"       Supine pec stretch on roll     10x10" x60 3" hold       TB IR/ER/ABD     3x10 YTB 3x10 YTB       OH lift      No weight 3x10                                 Ther Activity             Place cone on shelf OH     2x10                     Gait Training                                       Modalities

## 2020-08-31 DIAGNOSIS — F41.9 ANXIETY: ICD-10-CM

## 2020-08-31 RX ORDER — VENLAFAXINE HYDROCHLORIDE 75 MG/1
CAPSULE, EXTENDED RELEASE ORAL
Qty: 30 CAPSULE | Refills: 1 | Status: SHIPPED | OUTPATIENT
Start: 2020-08-31 | End: 2020-10-30

## 2020-09-01 ENCOUNTER — OFFICE VISIT (OUTPATIENT)
Dept: PHYSICAL THERAPY | Facility: CLINIC | Age: 50
End: 2020-09-01
Payer: COMMERCIAL

## 2020-09-01 DIAGNOSIS — Z48.89 AFTERCARE FOLLOWING SURGERY: ICD-10-CM

## 2020-09-01 DIAGNOSIS — S46.012S TRAUMATIC COMPLETE TEAR OF LEFT ROTATOR CUFF, SEQUELA: Primary | ICD-10-CM

## 2020-09-01 PROCEDURE — 97140 MANUAL THERAPY 1/> REGIONS: CPT

## 2020-09-01 PROCEDURE — 97110 THERAPEUTIC EXERCISES: CPT

## 2020-09-01 PROCEDURE — 97112 NEUROMUSCULAR REEDUCATION: CPT

## 2020-09-01 NOTE — PROGRESS NOTES
Daily Note     Today's date: 2020  Patient name: Daryn Black  : 1970  MRN: [id number]  Referring provider: Mayco Schneider MD  Dx:   Encounter Diagnosis     ICD-10-CM    1  Traumatic complete tear of left rotator cuff, sequela  S46 012S    2  Aftercare following surgery  Z48 89                   Subjective: Matt Zepeda said that his shoulder is tight when he woke up this morning  Objective: See treatment diary below      Assessment: Matt Zepeda continues to have ROM limitations to all planes; however, the body mechanics are gradually improving  He demonstrates thoracic compensations with flexion and abduction motions, but following scapular relocation and assistance techniques, the movement normalized slightly and was less pain provoking  Incorporating thoracic mobility exercises and stretches have also helped decrease the compensations seen  Treatment will continue to progress ROM, strength, and shoulder stability with limited compensations as able in order to return him to his PLOF  Plan: Continue per plan of care  Precautions: RTC repair protocol      Manuals    Shoulder PROM 10' 12' w/ light distraction for abd 8' CF  KK DF, flex w/ bridge, abd w/ LTR DF DF 10' 20' w/ flex w/ bridge, abd w/ LTR 10' 10'   Inferior glide Gr I/II     DF Gr III/IV  Gr I/II Gr I/II Gr I/II   Long axis distraction                          Neuro Re-Ed             RTC carola 2x10 abd/ER/IR 2x10 abd/ER/IR 2  x10 abd/ER/IR 2x10 abd/ER/IR home     2x10 abd/ER/IR   Scap retractions x20 x20 20x  Home          Bwd shoulder circles x20            Rhythmic stabilization     5x30"        TB rows/ pull downs  Red x30 ea Red x30 ea  GTB x30 ea   GTB x30 ea GTB x30 ea GTB x30 ea      Scap relocation/ asst     FE 2x10  FE 3x10      Serratus punches     x30  x30      Ther Ex             Table slides flex             Pendulums (AP, ML)        30" x4 4x30" 4x30"   Pulleys  3'/3' 3'/3' 2'/2'  3'/3' 3'/3' 3'/3' 3'/3' 8' 6' 6'   Cane ER standing 5"x30  standing 5" x 30  Stand 5"x30 x30 5" hold   10" x20 10"x20    Cane AAROM flex/abd Standing ABD 5" x30  standing ABD 5" x 30;  Stand abd 5"x30 Stand abd x30 5", supine flex 2#     Supine 10" x20 supine 10"x20; standing abd 2x10    Wall slides x30 30x  30 x  30x x30 w/ lift off x30 w/ lift off x30 w/ lift off x20 x20 x20   Wall ER stretch        10" x20     Cane ext         2x10    Seated thoracic ext Half roll 10x5" hold    x10 5" hold     nv   Shoulder AROM supine 2x10         2x10 FE supine   IR BTB stretch  5x30" 5 x 30"  5 x 30"  5x30" 10x10" Lift off x30   5x 30"   Doorway Pec  stretch    10x10"  10x10" 10x10"      Supine pec stretch on roll     10x10" x60 3" hold x30 3" hold; 3x30"      TB IR/ER/ABD     3x10 YTB 3x10 YTB 3x10 YTB      OH lift      No weight 3x10 No weight 3x10                                Ther Activity             Place cone on shelf OH     2x10  3 cones x5                   Gait Training                                       Modalities

## 2020-09-03 ENCOUNTER — OFFICE VISIT (OUTPATIENT)
Dept: PHYSICAL THERAPY | Facility: CLINIC | Age: 50
End: 2020-09-03
Payer: COMMERCIAL

## 2020-09-03 DIAGNOSIS — S46.012S TRAUMATIC COMPLETE TEAR OF LEFT ROTATOR CUFF, SEQUELA: Primary | ICD-10-CM

## 2020-09-03 DIAGNOSIS — Z48.89 AFTERCARE FOLLOWING SURGERY: ICD-10-CM

## 2020-09-03 PROCEDURE — 97112 NEUROMUSCULAR REEDUCATION: CPT

## 2020-09-03 PROCEDURE — 97110 THERAPEUTIC EXERCISES: CPT

## 2020-09-03 NOTE — PROGRESS NOTES
Daily Note     Today's date: 9/3/2020  Patient name: Jamaal Arce  : 1970  MRN: [id number]  Referring provider: Shahrzad Gaffney MD  Dx:   Encounter Diagnosis     ICD-10-CM    1  Traumatic complete tear of left rotator cuff, sequela  S46 012S    2  Aftercare following surgery  Z48 89        Start Time: 5647  Stop Time: 1230  Total time in clinic (min): 45 minutes    Subjective: Reports no pain today, just some stiffness  "Better than the other day "       Objective: See treatment diary below      Assessment: Tolerated treatment well  Pushes through exercises, motivated  Limited L shoulder flexion, 160 degrees PROM flexion  Continues to struggle with AROM FF and reaching above head  Will progress as able and as ROM progresses  Patient would benefit from continued PT      Plan: Continue per plan of care  Precautions: RTC repair protocol      Manuals 8/7 8/12 8/14  8/18 8/26 8/28 9/1 9/3  7/29   Shoulder PROM 10' 12' w/ light distraction for abd 8' CF  KK DF, flex w/ bridge, abd w/ LTR DF DF 10' 10' JR 10' 10'   Inferior glide Gr I/II     DF Gr III/IV   Gr I/II Gr I/II   Long axis distraction                          Neuro Re-Ed             RTC carola 2x10 abd/ER/IR 2x10 abd/ER/IR 2  x10 abd/ER/IR 2x10 abd/ER/IR home     2x10 abd/ER/IR   Scap retractions x20 x20 20x  Home          Bwd shoulder circles x20            Rhythmic stabilization     5x30"        TB rows/ pull downs  Red x30 ea Red x30 ea  GTB x30 ea   GTB x30 ea GTB x30 ea GTB x30 ea GTB x20 ea      Scap relocation/ asst     FE 2x10  FE 3x10 FE 3x10     Serratus punches     x30  x30 x30     Ther Ex             Table slides flex             Pendulums (AP, ML)         4x30" 4x30"   Pulleys  3'/3' 3'/3' 2'/2'  3'/3' 3'/3' 3'/3' 3'/3' 3'/3' 6' 6'   Cane ER standing 5"x30  standing 5" x 30  Stand 5"x30 x30 5" hold    10"x20    Cane AAROM flex/abd Standing ABD 5" x30  standing ABD 5" x 30;  Stand abd 5"x30 Stand abd x30 5", supine flex 2#      supine 10"x20; standing abd 2x10    Wall slides x30 30x  30 x  30x x30 w/ lift off x30 w/ lift off x30 w/ lift off x30 w/ lift off  x20 x20   Wall ER stretch             Cane ext         2x10    Seated thoracic ext Half roll 10x5" hold    x10 5" hold     nv   Shoulder AROM supine 2x10         2x10 FE supine   IR BTB stretch  5x30" 5 x 30"  5 x 30"  5x30" 10x10" Lift off x30 behind back lift off x30  5x 30"   Doorway Pec  stretch    10x10"  10x10" 10x10" 10''x10     Supine pec stretch on roll     10x10" x60 3" hold x30 3" hold; 3x30" 30''x3     TB IR/ER/ABD     3x10 YTB 3x10 YTB 3x10 YTB 3x10 YTB      OH lift      No weight 3x10 No weight 3x10 No weight 3x10                               Ther Activity             Place cone on shelf OH     2x10  3 cones x5 4 cones x5                   Gait Training                                       Modalities

## 2020-09-10 ENCOUNTER — OFFICE VISIT (OUTPATIENT)
Dept: PHYSICAL THERAPY | Facility: CLINIC | Age: 50
End: 2020-09-10
Payer: COMMERCIAL

## 2020-09-10 DIAGNOSIS — S46.012S TRAUMATIC COMPLETE TEAR OF LEFT ROTATOR CUFF, SEQUELA: Primary | ICD-10-CM

## 2020-09-10 DIAGNOSIS — Z48.89 AFTERCARE FOLLOWING SURGERY: ICD-10-CM

## 2020-09-10 PROCEDURE — 97112 NEUROMUSCULAR REEDUCATION: CPT

## 2020-09-10 PROCEDURE — 97110 THERAPEUTIC EXERCISES: CPT

## 2020-09-10 NOTE — PROGRESS NOTES
Daily Note     Today's date: 9/10/2020  Patient name: Jessica Mccullough  : 1970  MRN: [id number]  Referring provider: Eduardo Maldonado MD  Dx:   Encounter Diagnosis     ICD-10-CM    1  Traumatic complete tear of left rotator cuff, sequela  S46 012S    2  Aftercare following surgery  Z48 89        Start Time: 4763  Stop Time: 1100  Total time in clinic (min): 45 minutes    Subjective: No pain, just stiffness  States he has trouble sleeping at night  Objective: See treatment diary below      Assessment: Tolerated treatment well  Continues with ROM deficits  Challenged by AROM, especially FF above shoulder height and behind back  Instructed patient in [facility] door hang and wall slides to help increase ROM  Will progress strengthening exercises as ROM progresses  Patient would benefit from continued PT      Plan: Continue per plan of care  Precautions: RTC repair protocol      Manuals 8/7 8/12 8/14  8/18 8/26 8/28 9/1 9/3 9/10 7/29   Shoulder PROM 10' 12' w/ light distraction for abd 8' CF  KK DF, flex w/ bridge, abd w/ LTR DF DF 10' 10' JR 10' JR 10'   Inferior glide Gr I/II     DF Gr III/IV    Gr I/II   Long axis distraction                          Neuro Re-Ed             RTC carola 2x10 abd/ER/IR 2x10 abd/ER/IR 2  x10 abd/ER/IR 2x10 abd/ER/IR home     2x10 abd/ER/IR   Scap retractions x20 x20 20x  Home          Bwd shoulder circles x20            Rhythmic stabilization     5x30"        TB rows/ pull downs  Red x30 ea Red x30 ea  GTB x30 ea   GTB x30 ea GTB x30 ea GTB x30 ea GTB x20 ea  GTB x20 ea     Scap relocation/ asst     FE 2x10  FE 3x10 FE 3x10 FE 3x10    Serratus punches     x30  x30 x30 x30    Ther Ex             Table slides flex             Pendulums (AP, ML)          4x30"   Pulleys  3'/3' 3'/3' 2'/2'  3'/3' 3'/3' 3'/3' 3'/3' 3'/3' 3'/3' 6'   Cane ER standing 5"x30  standing 5" x 30  Stand 5"x30 x30 5" hold        Cane AAROM flex/abd Standing ABD 5" x30  standing ABD 5" x 30;  Stand abd 5"x30 Stand abd x30 5", supine flex 2#          Wall slides x30 30x  30 x  30x x30 w/ lift off x30 w/ lift off x30 w/ lift off x30 w/ lift off  x30 w/lift off  x20   Wall ER stretch             Cane ext             Seated thoracic ext Half roll 10x5" hold    x10 5" hold     nv   Shoulder AROM supine 2x10         2x10 FE supine   IR BTB stretch  5x30" 5 x 30"  5 x 30"  5x30" 10x10" Lift off x30 behind back lift off x30 behind back lift off x30 5x 30"   Doorway Pec  stretch    10x10"  10x10" 10x10" 10''x10 10''x10    Supine pec stretch on roll     10x10" x60 3" hold x30 3" hold; 3x30" 30''x3 30''x3    TB IR/ER/ABD     3x10 YTB 3x10 YTB 3x10 YTB 3x10 YTB  3x10 YTB    OH lift      No weight 3x10 No weight 3x10 No weight 3x10 No weight 3x10                              Ther Activity             Place cone on shelf OH     2x10  3 cones x5 4 cones x5  1 cone x20                 Gait Training                                       Modalities

## 2020-09-16 ENCOUNTER — OFFICE VISIT (OUTPATIENT)
Dept: PHYSICAL THERAPY | Facility: CLINIC | Age: 50
End: 2020-09-16
Payer: COMMERCIAL

## 2020-09-16 DIAGNOSIS — S46.012S TRAUMATIC COMPLETE TEAR OF LEFT ROTATOR CUFF, SEQUELA: Primary | ICD-10-CM

## 2020-09-16 DIAGNOSIS — Z48.89 AFTERCARE FOLLOWING SURGERY: ICD-10-CM

## 2020-09-16 PROCEDURE — 97110 THERAPEUTIC EXERCISES: CPT

## 2020-09-16 PROCEDURE — 97112 NEUROMUSCULAR REEDUCATION: CPT

## 2020-09-16 NOTE — PROGRESS NOTES
Daily Note     Today's date: 2020  Patient name: Jessica Mccullough  : 1970  MRN: [id number]  Referring provider: Eduardo Maldonado MD  Dx:   Encounter Diagnosis     ICD-10-CM    1  Traumatic complete tear of left rotator cuff, sequela  S46 012S    2  Aftercare following surgery  Z48 89                   Subjective: David Hernandez states that his shoulder still feels stiff at his end-range, but this is improving  He notes that mostly he is feeling fatigue as he is doing stuff around the house  He reports that sleeping is continues to be difficult for him as he tends to sleep on the L side  He says that he needs to cut today's session a few minutes short as he has a meeting  Objective: See treatment diary below      Assessment: Augustin's ROM is improving with less thoracic compensations observed  He remains limited in all planes secondary to tightness, but this continues to gradually decrease with HEP and PT sessions  Progressed strengthening exercises today with expected increase in soreness and fatigue by end of session  Discussed attempting to put a pillow underneath his arm and to bring it slight in front of his body line while sleeping to see if this is more tolerable  Treatment will continue to progress ROM, strength, and stability of his L shoulder for improved function and eventual return to PLOF  Plan: Continue per plan of care  Progress treatment as tolerated         Precautions: RTC repair protocol      Manuals 8/7 8/12 8/14  8/18 8/26 8/28 9/1 9/3 9/10 9/16   Shoulder PROM 10' 12' w/ light distraction for abd 8' CF  KK DF, flex w/ bridge, abd w/ LTR DF DF 10' 10' JR 10' JR    Inferior glide Gr I/II     DF Gr III/IV       Long axis distraction                          Neuro Re-Ed             RTC carola 2x10 abd/ER/IR 2x10 abd/ER/IR 2  x10 abd/ER/IR 2x10 abd/ER/IR home        Scap retractions x20 x20 20x  Home          Bwd shoulder circles x20            Rhythmic stabilization     5x30" 5x30"   TB rows/ pull downs  Red x30 ea Red x30 ea  GTB x30 ea   GTB x30 ea GTB x30 ea GTB x30 ea GTB x20 ea  GTB x20 ea  Blue TB x30 ea   Scap relocation/ asst     FE 2x10  FE 3x10 FE 3x10 FE 3x10 FE 3x10   Serratus punches     x30  x30 x30 x30 x30   Prone ext w/ scap squeeze          3x10   Ther Ex             Table slides flex             Pendulums (AP, ML)             Pulleys  3'/3' 3'/3' 2'/2'  3'/3' 3'/3' 3'/3' 3'/3' 3'/3' 3'/3' 4'   Cane ER standing 5"x30  standing 5" x 30  Stand 5"x30 x30 5" hold        Cane AAROM flex/abd Standing ABD 5" x30  standing ABD 5" x 30;  Stand abd 5"x30 Stand abd x30 5", supine flex 2#          Wall slides x30 30x  30 x  30x x30 w/ lift off x30 w/ lift off x30 w/ lift off x30 w/ lift off  x30 w/lift off  x30 w/ lift off   Wall ER stretch             Cane ext             Seated thoracic ext Half roll 10x5" hold    x10 5" hold        Shoulder AROM supine 2x10            IR BTB stretch  5x30" 5 x 30"  5 x 30"  5x30" 10x10" Lift off x30 behind back lift off x30 behind back lift off x30 Behind back lift off x30   Doorway Pec  stretch    10x10"  10x10" 10x10" 10''x10 10''x10 10" x10   Supine pec stretch on roll     10x10" x60 3" hold x30 3" hold; 3x30" 30''x3 30''x3    TB IR/ER/ABD     3x10 YTB 3x10 YTB 3x10 YTB 3x10 YTB  3x10 YTB 3x10 RTB   OH lift      No weight 3x10 No weight 3x10 No weight 3x10 No weight 3x10 No weight 3x10                             Ther Activity             Place cone on shelf OH     2x10  3 cones x5 4 cones x5  1 cone x20 1 cone x30                Gait Training                                       Modalities

## 2020-09-17 ENCOUNTER — OFFICE VISIT (OUTPATIENT)
Dept: PHYSICAL THERAPY | Facility: CLINIC | Age: 50
End: 2020-09-17
Payer: COMMERCIAL

## 2020-09-17 DIAGNOSIS — S46.012S TRAUMATIC COMPLETE TEAR OF LEFT ROTATOR CUFF, SEQUELA: Primary | ICD-10-CM

## 2020-09-17 DIAGNOSIS — Z48.89 AFTERCARE FOLLOWING SURGERY: ICD-10-CM

## 2020-09-17 PROCEDURE — 97110 THERAPEUTIC EXERCISES: CPT

## 2020-09-17 PROCEDURE — 97112 NEUROMUSCULAR REEDUCATION: CPT

## 2020-09-17 NOTE — PROGRESS NOTES
Daily Note     Today's date: 2020  Patient name: Eduardo Saeed  : 1970  MRN: [id number]  Referring provider: Marie Monteiro MD  Dx:   Encounter Diagnosis     ICD-10-CM    1  Traumatic complete tear of left rotator cuff, sequela  S46 012S    2  Aftercare following surgery  Z48 89        Start Time: 4017  Stop Time: 1023  Total time in clinic (min): 45 minutes    Subjective: Reports no pain today, "It feels loose today "       Objective: See treatment diary below      Assessment: Tolerated treatment well  Slow, steady progression with ROM  Added some shoulder strengthening exercises including S/L ER, added tband for OH wall slide with lift off, wall slides with foam roller and YTB, and added weights for serratus press  Improvements with FF PROM noted with PROM today  Will continue to progress strengthening exercises as ROM allows  Patient demonstrated fatigue post treatment and would benefit from continued PT      Plan: Continue per plan of care  Precautions: RTC repair protocol      Manuals 9/17  8/14  8/18 8/26 8/28 9/1 9/3 9/10 9/16   Shoulder PROM JR 10'  12' w/ light distraction for abd 8' CF  KK DF, flex w/ bridge, abd w/ LTR DF DF 10' 10' JR 10' JR    Inferior glide      DF Gr III/IV       Long axis distraction                          Neuro Re-Ed             RTC carola  2x10 abd/ER/IR 2  x10 abd/ER/IR 2x10 abd/ER/IR home        Scap retractions  x20 20x  Home          Bwd shoulder circles             Rhythmic stabilization 3x30''    5x30"     5x30"   TB rows/ pull downs Blue TB x30 ea  Red x30 ea Red x30 ea  GTB x30 ea   GTB x30 ea GTB x30 ea GTB x30 ea GTB x20 ea  GTB x20 ea  Blue TB x30 ea   FE elevation relocation/ asst 3x10    FE 2x10  FE 3x10 FE 3x10 FE 3x10 FE 3x10   Serratus punches 2# 3x10     x30  x30 x30 x30 x30   Prone ext w/ scap squeeze 3x10          3x10   Foam roller wall slides with YTB  x10            Ther Ex             UBE 2'            Table slides flex Pendulums (AP, ML)             Pulleys  4'  3'/3' 2'/2'  3'/3' 3'/3' 3'/3' 3'/3' 3'/3' 3'/3' 4'   Cane ER   standing 5" x 30  Stand 5"x30 x30 5" hold        Cane AAROM flex/abd   standing ABD 5" x 30;  Stand abd 5"x30 Stand abd x30 5", supine flex 2#          Wall slides x30 lift off YTB  30x  30 x  30x x30 w/ lift off x30 w/ lift off x30 w/ lift off x30 w/ lift off  x30 w/lift off  x30 w/ lift off   Wall ER stretch             Cane ext             Seated thoracic ext     x10 5" hold        Shoulder AROM             IR BTB stretch Behind back lift off x30 5x30" 5 x 30"  5 x 30"  5x30" 10x10" Lift off x30 behind back lift off x30 behind back lift off x30 Behind back lift off x30   Doorway Pec  stretch 10''x10   10x10"  10x10" 10x10" 10''x10 10''x10 10" x10   Supine pec stretch on roll     10x10" x60 3" hold x30 3" hold; 3x30" 30''x3 30''x3    TB IR/ER/ABD RTB 3x10    3x10 YTB 3x10 YTB 3x10 YTB 3x10 YTB  3x10 YTB 3x10 RTB   OH lift 3x10      No weight 3x10 No weight 3x10 No weight 3x10 No weight 3x10 No weight 3x10   S/L ER 2x10            Sleeper stretch  20''x3            Ther Activity             Place cone on shelf OH 1 cone x30    2x10  3 cones x5 4 cones x5  1 cone x20 1 cone x30                Gait Training                                       Modalities

## 2020-09-22 ENCOUNTER — OFFICE VISIT (OUTPATIENT)
Dept: OBGYN CLINIC | Facility: CLINIC | Age: 50
End: 2020-09-22
Payer: COMMERCIAL

## 2020-09-22 VITALS
WEIGHT: 233 LBS | SYSTOLIC BLOOD PRESSURE: 148 MMHG | BODY MASS INDEX: 34.51 KG/M2 | RESPIRATION RATE: 20 BRPM | DIASTOLIC BLOOD PRESSURE: 76 MMHG | HEIGHT: 69 IN | HEART RATE: 72 BPM | TEMPERATURE: 97.6 F

## 2020-09-22 DIAGNOSIS — Z98.890 S/P ROTATOR CUFF REPAIR: Primary | ICD-10-CM

## 2020-09-22 PROCEDURE — 4004F PT TOBACCO SCREEN RCVD TLK: CPT | Performed by: ORTHOPAEDIC SURGERY

## 2020-09-22 PROCEDURE — 99213 OFFICE O/P EST LOW 20 MIN: CPT | Performed by: ORTHOPAEDIC SURGERY

## 2020-09-22 PROCEDURE — 3078F DIAST BP <80 MM HG: CPT | Performed by: ORTHOPAEDIC SURGERY

## 2020-09-22 PROCEDURE — 3077F SYST BP >= 140 MM HG: CPT | Performed by: ORTHOPAEDIC SURGERY

## 2020-09-22 NOTE — PROGRESS NOTES
SUBJECTIVE: Patient is a 44year old male s/p Left arthroscopic Rotator cuff repair performed 06/04/2020  Patient states he is doing well and progressing with PT and initiating strengthening exercises  He is continuing HEP  Patient offers no additional complaints or concerns at this time             ROS:   General: No fever, no chills, no weight loss, no weight gain  HEENT:  No loss of hearing, no nose bleeds, no sore throat  Eyes:  No eye pain, no red eyes, no visual disturbance  Respiratory:  No cough, no shortness of breath, no wheezing  Cardiovascular:  No chest pain, no palpitations, no edema  GI: No abdominal pain, no nausea, no vomiting  Endocrine: No frequent urination, no excessive thirst  Urinary:  No dysuria, no hematuria, no incontinence  Musculoskeletal: see HPI and PE  Skin:  No rash, no wounds  Neurological:  No dizziness, no headache, no numbness  Psychiatric:  No difficulty concentrating, no depression, no suicide thoughts, no anxiety  Review of all other systems is negative    PMH:  Past Medical History:   Diagnosis Date    Anxiety     CPAP (continuous positive airway pressure) dependence     Depression     Hyperlipidemia     Hypertension     Sleep apnea        PSH:  Past Surgical History:   Procedure Laterality Date    APPENDECTOMY      CARDIAC CATHETERIZATION      KNEE SURGERY Left     FL LAP, VENTRAL HERNIA REPAIR,REDUCIBLE N/A 1/10/2018    Procedure: LAPAROSCOPIC UMBILICAL HERNIA REPAIR WITH MESH;  Surgeon: Trang Moncada MD;  Location: [facility];  Service: General    FL SHLDR ARTHROSCOP,SURG,W/ROTAT CUFF REPR Left 6/4/2020    Procedure: Left shoulder arthroscopic rotator cuff repair, possible biceps tenodesis vs biceps tenodesis open;  Surgeon: Rony Cobian MD;  Location: [facility];  Service: Orthopedics       Medications:  Current Outpatient Medications   Medication Sig Dispense Refill    acetaminophen (TYLENOL) 650 mg CR tablet Take 1 tablet (650 mg total) by mouth every 8 (eight) hours as needed for mild pain 30 tablet 0    albuterol (VENTOLIN HFA) 90 mcg/act inhaler Inhale 2 puffs every 6 (six) hours as needed for wheezing 1 Inhaler 0    amLODIPine (NORVASC) 5 mg tablet take 1 tablet by mouth daily 30 tablet 3    aspirin 81 MG tablet Take 81 tablets by mouth daily      atorvastatin (LIPITOR) 40 mg tablet take 1 tablet by mouth daily 30 tablet 5    dicyclomine (BENTYL) 20 mg tablet Take 1 tablet (20 mg total) by mouth 2 (two) times a day 20 tablet 0    diphenhydrAMINE (BENADRYL) 25 mg tablet Take 25 mg by mouth every 6 (six) hours as needed for itching      gabapentin (NEURONTIN) 100 mg capsule Take 1 capsule (100 mg total) by mouth 3 (three) times a day 60 capsule 0    ondansetron (ZOFRAN) 4 mg tablet Take 1 tablet (4 mg total) by mouth every 8 (eight) hours as needed for nausea or vomiting 12 tablet 0    tamsulosin (FLOMAX) 0 4 mg Take 1 capsule (0 4 mg total) by mouth daily with dinner 30 capsule 5    venlafaxine (EFFEXOR-XR) 75 mg 24 hr capsule take 1 capsule by mouth once daily 30 capsule 1     No current facility-administered medications for this visit  Allergies:   Allergies   Allergen Reactions    Betadine [Povidone Iodine] Rash       Family History:  Family History   Problem Relation Age of Onset    Hypertension Mother     Coronary artery disease Mother     Hypertension Father     Heart disease Father     Coronary artery disease Father     Coronary artery disease Brother     Hypertension Brother        Social History:  Social History     Occupational History    Not on file   Tobacco Use    Smoking status: Current Some Day Smoker     Types: Cigars    Smokeless tobacco: Never Used    Tobacco comment: rare   Substance and Sexual Activity    Alcohol use: Yes     Frequency: 2-3 times a week     Comment: rarely    Drug use: No    Sexual activity: Not on file       Physical Exam:  General :  Alert, cooperative, no distress, appears stated age  Blood pressure 148/76, pulse 72, temperature 97 6 °F (36 4 °C), temperature source Temporal, resp  rate 20, height 5' 9" (1 753 m), weight 106 kg (233 lb)  Head:  Normocephalic, without obvious abnormality, atraumatic   Eyes:  Conjunctiva/corneas clear, EOM's intact,   Ears: Both ears normal appearance, no hearing deficits  Nose: Nares normal, septum midline, no drainage    Neck: Supple,  trachea midline, no adenopathy, no tenderness, no mass   Back:   Symmetric, no curvature, ROM normal, no tenderness   Lungs:   Respirations unlabored   Chest Wall:  No tenderness or deformity   Extremities: Extremities normal, atraumatic, no cyanosis or edema      Pulses: 2+ and symmetric   Skin: Skin color, texture, turgor normal, no rashes or lesions      Neurologic: Normal             Ortho Exam  Left shoulder  · Surgical incision well healed without rash, erythema, warmth  · AROM 0-145 ABD, 0-150 flexion  · No tenderness to palpation   · Sensation intact axillary, musculocutaneous, median, radial and ulnar distributions       Imaging Studies: The following imaging studies were reviewed in office today  My findings are noted  No new images        Assessment  Encounter Diagnosis   Name Primary?     S/P rotator cuff repair Yes           Plan: 45 yo male s/p Left arthroscopic rotator cuff repair   - Continue PT and HEP for strengthening and ROM   - OTC medications for pain as needed   - Follow up in 6 weeks for reevaluation

## 2020-09-24 ENCOUNTER — APPOINTMENT (OUTPATIENT)
Dept: PHYSICAL THERAPY | Facility: CLINIC | Age: 50
End: 2020-09-24
Payer: COMMERCIAL

## 2020-09-28 ENCOUNTER — OFFICE VISIT (OUTPATIENT)
Dept: PHYSICAL THERAPY | Facility: CLINIC | Age: 50
End: 2020-09-28
Payer: COMMERCIAL

## 2020-09-28 DIAGNOSIS — S46.012S TRAUMATIC COMPLETE TEAR OF LEFT ROTATOR CUFF, SEQUELA: Primary | ICD-10-CM

## 2020-09-28 DIAGNOSIS — Z48.89 AFTERCARE FOLLOWING SURGERY: ICD-10-CM

## 2020-09-28 PROCEDURE — 97112 NEUROMUSCULAR REEDUCATION: CPT

## 2020-09-28 PROCEDURE — 97110 THERAPEUTIC EXERCISES: CPT

## 2020-09-28 NOTE — PROGRESS NOTES
PT Re-Evaluation     Today's date: 2020  Patient name: Daryn Black  : 1970  MRN: [id number]  Referring provider: Mayco Schneider MD  Dx:   Encounter Diagnosis     ICD-10-CM    1  Traumatic complete tear of left rotator cuff, sequela  S46 012S    2  Aftercare following surgery  Z48 89                   Assessment  Assessment details: Daryn Black is s/p L rotator cuff repair on 2020  He continues to present with ROM restriction in all planes of motion both passively and actively  Current AROM is about 164 degrees flexion, 150 degrees abduction, and 50 degrees of ER  This ROM limitation is secondary to tissue restriction with minimal thoracic compensations remaining for his active motions and minor discomfort remains at his end ranges  Resisted tests at neutral UE position at his side are all strong and painless; however, when tested further into his ROM, weakness for flexion, abduction and ER presents  None of these tests produce any reported increase in pain of his L shoulder, just feels hard and difficult to do  He continues to make slow progress towards his ROM goals, but plan is to shift to more addressing his strength and muscle endurance deficits as unlikely he will get more range at this point  He tolerated introduction of several stabilization and progression of PREs for the L RTC today with no pain, only minor soreness and fatigue reported by end of session  He will benefit from continued skilled PT to progress his strengthening and stabilization exercises for return to PLOF and transition to independent progression  Plan is to decrease down to 1x/week as he has been consistent with his HEP to this point  Impairments: abnormal or restricted ROM, activity intolerance, impaired physical strength, lacks appropriate home exercise program and pain with function    Symptom irritability: lowUnderstanding of Dx/Px/POC: good   Prognosis: good    Goals  STGs in 4 weeks  1   Patient will be independent with HEP (MET)  2  Patient will have full PROM without interference from L shoulder pain (ONGOING)  3  Patient will have strong and painless resisted tests of L shoulder for increased strength (MET)  LTGs in 8 weeks  1  Patient will be able to place a 2# weight on a shelf at overhead without interference from L shoulder pain (ONGOING)  2  Patient will be able to sleep through the night without interference from L shoulder pain (MET)  3  Patient will have full AROM without interference from L shoulder pain (ONGOING)  Plan  Patient would benefit from: skilled physical therapy  Planned modality interventions: cryotherapy and TENS  Planned therapy interventions: joint mobilization, manual therapy, massage, neuromuscular re-education, patient education, strengthening, stretching, therapeutic activities, therapeutic exercise and home exercise program  Frequency: 1x week  Duration in weeks: 4  Treatment plan discussed with: patient        Subjective Evaluation    History of Present Illness  Date of surgery: 2020  Mechanism of injury: Stephon Tapia states that he is experiencing some tweeks here and there in the shoulder, which he thinks is related to muscle fatigue  He notes that the end of his motions remain slightly tight with minordiscomfort and feels that he has some motion still to regain  He remarks that he feels weakness and muscle endurance are his biggest issues that remain for his shoulder     Pain  Current pain ratin  At best pain ratin  At worst pain ratin  Quality: dull ache and discomfort  Relieving factors: ice and medications          Objective     Active Range of Motion   Left Shoulder   Flexion: 164 (minor tightness) degrees   Abduction: 155 degrees with pain  External rotation 0°: 49 (tightness) degrees   Internal rotation BTB: T10     Passive Range of Motion   Left Shoulder   Flexion: 170 degrees   Abduction: 160 degrees   External rotation 0°: 50 degrees Strength/Myotome Testing     Left Shoulder     Planes of Motion   Flexion: 4   Extension: 5   Abduction: 4 (minor pain)   Adduction: 5 (minor pain)   External rotation at 0°: 4 (minor pain)   Internal rotation at 0°: 5 (minor pain)     Isolated Muscles   Biceps: 5   Triceps: 5     Additional Strength Details  L resisted tests at neutral shoulder position with elbow at 90 degrees flexion only demonstrated weakness with ER  Weakness of flexion and abduction present further into range               Precautions: RTC repair protocol      Manuals 9/17 9/28 8/14  8/18 8/26 8/28 9/1 9/3 9/10 9/16   Shoulder PROM JR 10'   8' CF  KK DF, flex w/ bridge, abd w/ LTR DF DF 10' 10' JR 10' JR    Inferior glide      DF Gr III/IV       Long axis distraction                          Neuro Re-Ed             RTC carola   2  x10 abd/ER/IR 2x10 abd/ER/IR home        Scap retractions   20x  Home          Bwd shoulder circles             Rhythmic stabilization 3x30'' 3x30"   5x30"     5x30"   TB rows/ pull downs Blue TB x30 ea  Blue TB x30 ea Red x30 ea  GTB x30 ea   GTB x30 ea GTB x30 ea GTB x30 ea GTB x20 ea  GTB x20 ea  Blue TB x30 ea   FE elevation relocation/ asst 3x10    FE 2x10  FE 3x10 FE 3x10 FE 3x10 FE 3x10   Serratus punches 2# 3x10  2# 3x10   x30  x30 x30 x30 x30   Prone ext w/ scap squeeze 3x10  2x10        3x10   Foam roller wall slides with YTB  x10 x15           Ball circles  Red x15 ea           Wall clocks  x10           ABCs  Supine 2# x1           Prone ITY  x10 ea           Ther Ex             UBE 2' 2'/2'           Table slides flex             Pendulums (AP, ML)             Pulleys  4'  5' 2'/2'  3'/3' 3'/3' 3'/3' 3'/3' 3'/3' 3'/3' 4'   Cane ER   standing 5" x 30  Stand 5"x30 x30 5" hold        Cane AAROM flex/abd   standing ABD 5" x 30;  Stand abd 5"x30 Stand abd x30 5", supine flex 2#          Wall slides x30 lift off YTB  Lift off YTB x30 30 x  30x x30 w/ lift off x30 w/ lift off x30 w/ lift off x30 w/ lift off x30 w/lift off  x30 w/ lift off   Wall ER stretch             Cane ext             Seated thoracic ext     x10 5" hold        Shoulder AROM             IR BTB stretch Behind back lift off x30  5 x 30"  5 x 30"  5x30" 10x10" Lift off x30 behind back lift off x30 behind back lift off x30 Behind back lift off x30   Doorway Pec  stretch 10''x10   10x10"  10x10" 10x10" 10''x10 10''x10 10" x10   Supine pec stretch on roll     10x10" x60 3" hold x30 3" hold; 3x30" 30''x3 30''x3    TB IR/ER/ABD RTB 3x10 GTB 3x10 IR; RTB 3x10 ER; 2# abd 3x10   3x10 YTB 3x10 YTB 3x10 YTB 3x10 YTB  3x10 YTB 3x10 RTB   OH lift 3x10  2# 3x10    No weight 3x10 No weight 3x10 No weight 3x10 No weight 3x10 No weight 3x10   S/L ER 2x10 3x10           Sleeper stretch  20''x3            Ther Activity             Place cone on shelf OH 1 cone x30    2x10  3 cones x5 4 cones x5  1 cone x20 1 cone x30   Weight carries  Red OH, 90* flex, 90* abd 4x30 ft           Gait Training                                       Modalities

## 2020-09-30 ENCOUNTER — OFFICE VISIT (OUTPATIENT)
Dept: PHYSICAL THERAPY | Facility: CLINIC | Age: 50
End: 2020-09-30
Payer: COMMERCIAL

## 2020-09-30 DIAGNOSIS — Z48.89 AFTERCARE FOLLOWING SURGERY: ICD-10-CM

## 2020-09-30 DIAGNOSIS — S46.012S TRAUMATIC COMPLETE TEAR OF LEFT ROTATOR CUFF, SEQUELA: Primary | ICD-10-CM

## 2020-09-30 PROCEDURE — 97112 NEUROMUSCULAR REEDUCATION: CPT

## 2020-09-30 PROCEDURE — 97140 MANUAL THERAPY 1/> REGIONS: CPT

## 2020-09-30 PROCEDURE — 97110 THERAPEUTIC EXERCISES: CPT

## 2020-09-30 NOTE — PROGRESS NOTES
Daily Note     Today's date: 2020  Patient name: Swetha Louie  : 1970  MRN: [id number]  Referring provider: Tom Montoya MD  Dx:   Encounter Diagnosis     ICD-10-CM    1  Traumatic complete tear of left rotator cuff, sequela  S46 012S    2  Aftercare following surgery  Z48 89                   Subjective: Sydney Morgan states that his shoulder feels "tweeky"  Overall, it is feeling good until he moves it a certain way and get a minor twinge  Objective: See treatment diary below      Assessment: Sydney Morgan tolerated PREs well today with minor progression due to soreness and fatigue after last session  He continues to demonstrate weakness with ER and he fatigues quickly with exercises addressing this weakness  He will benefit from continued strengthening and stabilization of the RTC muscles for return to PLOF  Plan: Continue per plan of care  Precautions: RTC repair protocol      Manuals 9/17 9/28 9/30 8/18 8/26 8/28 9/1 9/3 9/10 9/16   Shoulder PROM JR 10'    DF 10' KK DF, flex w/ bridge, abd w/ LTR DF DF 10' 10' JR 10' JR    Inferior glide      DF Gr III/IV       Long axis distraction                          Neuro Re-Ed             RTC carola    2x10 abd/ER/IR home        Scap retractions    Home          Bwd shoulder circles             Rhythmic stabilization 3x30'' 3x30"   5x30"     5x30"   TB rows/ pull downs Blue TB x30 ea  Blue TB x30 ea Blue TB x30 ea GTB x30 ea   GTB x30 ea GTB x30 ea GTB x30 ea GTB x20 ea  GTB x20 ea  Blue TB x30 ea   FE elevation relocation/ asst 3x10    FE 2x10  FE 3x10 FE 3x10 FE 3x10 FE 3x10   Serratus punches 2# 3x10  2# 3x10 2# 3x10  x30  x30 x30 x30 x30   Prone ext w/ scap squeeze 3x10  2x10 2# 3x10       3x10   Foam roller wall slides with YTB  x10 x15 x20          Ball circles  Red x15 ea Red x30 ea          Wall clocks  x10 YTB x15          ABCs  Supine 2# x1 Supine 2# x2          Prone ITY  x10 ea 2x10 ea          Body blade elbow 90*   nv          LT ER YTB 3x10          Ther Ex             UBE 2' 2'/2' 3'/3'          Table slides flex             Pendulums (AP, ML)             Pulleys  4'  5' 4' 3'/3' 3'/3' 3'/3' 3'/3' 3'/3' 3'/3' 4'   Cane ER   Stand 5"x10 Stand 5"x30 x30 5" hold        Cane AAROM flex/abd   Stand abd 10" x10 Stand abd 5"x30 Stand abd x30 5", supine flex 2#          Wall slides x30 lift off YTB  Lift off YTB x30 Lift off YTB x30 30x x30 w/ lift off x30 w/ lift off x30 w/ lift off x30 w/ lift off  x30 w/lift off  x30 w/ lift off   Wall ER stretch             Cane ext             Seated thoracic ext     x10 5" hold        Shoulder AROM             IR BTB stretch Behind back lift off x30   5 x 30"  5x30" 10x10" Lift off x30 behind back lift off x30 behind back lift off x30 Behind back lift off x30   Doorway Pec  stretch 10''x10   10x10"  10x10" 10x10" 10''x10 10''x10 10" x10   Supine pec stretch on roll     10x10" x60 3" hold x30 3" hold; 3x30" 30''x3 30''x3    TB IR/ER/ABD RTB 3x10 GTB 3x10 IR; RTB 3x10 ER; 2# abd 3x10 GTB x30 IR; RTB x30 ER  3x10 YTB 3x10 YTB 3x10 YTB 3x10 YTB  3x10 YTB 3x10 RTB   OH lift 3x10  2# 3x10    No weight 3x10 No weight 3x10 No weight 3x10 No weight 3x10 No weight 3x10   S/L ER 2x10 3x10 3x10          Sleeper stretch  20''x3  30" x3          End range flexion   Half wall x30          OH lower in ABD   YTB 3x10          Ther Activity             Place cone on shelf OH 1 cone x30    2x10  3 cones x5 4 cones x5  1 cone x20 1 cone x30   Weight carries  Red OH, 90* flex, 90* abd 4x30 ft Red OH, 90* flex, 90* abd 4x30 ft          Gait Training                                       Modalities

## 2020-10-08 ENCOUNTER — OFFICE VISIT (OUTPATIENT)
Dept: PHYSICAL THERAPY | Facility: CLINIC | Age: 50
End: 2020-10-08
Payer: COMMERCIAL

## 2020-10-08 DIAGNOSIS — S46.012S TRAUMATIC COMPLETE TEAR OF LEFT ROTATOR CUFF, SEQUELA: Primary | ICD-10-CM

## 2020-10-08 DIAGNOSIS — Z48.89 AFTERCARE FOLLOWING SURGERY: ICD-10-CM

## 2020-10-08 PROCEDURE — 97110 THERAPEUTIC EXERCISES: CPT

## 2020-10-08 PROCEDURE — 97112 NEUROMUSCULAR REEDUCATION: CPT

## 2020-10-20 ENCOUNTER — OFFICE VISIT (OUTPATIENT)
Dept: PHYSICAL THERAPY | Facility: CLINIC | Age: 50
End: 2020-10-20
Payer: COMMERCIAL

## 2020-10-20 DIAGNOSIS — S46.012S TRAUMATIC COMPLETE TEAR OF LEFT ROTATOR CUFF, SEQUELA: ICD-10-CM

## 2020-10-20 DIAGNOSIS — Z48.89 AFTERCARE FOLLOWING SURGERY: Primary | ICD-10-CM

## 2020-10-20 PROCEDURE — 97110 THERAPEUTIC EXERCISES: CPT

## 2020-10-20 PROCEDURE — 97112 NEUROMUSCULAR REEDUCATION: CPT

## 2020-10-20 PROCEDURE — 97140 MANUAL THERAPY 1/> REGIONS: CPT

## 2020-10-29 ENCOUNTER — OFFICE VISIT (OUTPATIENT)
Dept: PHYSICAL THERAPY | Facility: CLINIC | Age: 50
End: 2020-10-29
Payer: COMMERCIAL

## 2020-10-29 DIAGNOSIS — Z48.89 AFTERCARE FOLLOWING SURGERY: Primary | ICD-10-CM

## 2020-10-29 DIAGNOSIS — S46.012S TRAUMATIC COMPLETE TEAR OF LEFT ROTATOR CUFF, SEQUELA: ICD-10-CM

## 2020-10-29 PROCEDURE — 97112 NEUROMUSCULAR REEDUCATION: CPT

## 2020-10-29 PROCEDURE — 97110 THERAPEUTIC EXERCISES: CPT

## 2020-10-30 DIAGNOSIS — F41.9 ANXIETY: ICD-10-CM

## 2020-10-30 RX ORDER — VENLAFAXINE HYDROCHLORIDE 75 MG/1
CAPSULE, EXTENDED RELEASE ORAL
Qty: 30 CAPSULE | Refills: 1 | Status: SHIPPED | OUTPATIENT
Start: 2020-10-30 | End: 2020-12-28

## 2020-11-04 ENCOUNTER — VBI (OUTPATIENT)
Dept: ADMINISTRATIVE | Facility: OTHER | Age: 50
End: 2020-11-04

## 2020-11-29 DIAGNOSIS — I10 ESSENTIAL HYPERTENSION: ICD-10-CM

## 2020-11-30 RX ORDER — AMLODIPINE BESYLATE 5 MG/1
TABLET ORAL
Qty: 30 TABLET | Refills: 3 | Status: SHIPPED | OUTPATIENT
Start: 2020-11-30 | End: 2021-03-25

## 2020-12-25 DIAGNOSIS — F41.9 ANXIETY: ICD-10-CM

## 2020-12-28 RX ORDER — VENLAFAXINE HYDROCHLORIDE 75 MG/1
CAPSULE, EXTENDED RELEASE ORAL
Qty: 30 CAPSULE | Refills: 1 | Status: SHIPPED | OUTPATIENT
Start: 2020-12-28 | End: 2021-02-22

## 2021-02-22 DIAGNOSIS — F41.9 ANXIETY: ICD-10-CM

## 2021-02-22 RX ORDER — VENLAFAXINE HYDROCHLORIDE 75 MG/1
CAPSULE, EXTENDED RELEASE ORAL
Qty: 30 CAPSULE | Refills: 1 | Status: SHIPPED | OUTPATIENT
Start: 2021-02-22 | End: 2021-04-26

## 2021-03-25 DIAGNOSIS — I10 ESSENTIAL HYPERTENSION: ICD-10-CM

## 2021-03-25 RX ORDER — AMLODIPINE BESYLATE 5 MG/1
TABLET ORAL
Qty: 30 TABLET | Refills: 0 | Status: SHIPPED | OUTPATIENT
Start: 2021-03-25 | End: 2021-04-26

## 2021-04-26 DIAGNOSIS — F41.9 ANXIETY: ICD-10-CM

## 2021-04-26 DIAGNOSIS — I10 ESSENTIAL HYPERTENSION: ICD-10-CM

## 2021-04-26 DIAGNOSIS — E78.2 MIXED HYPERLIPIDEMIA: ICD-10-CM

## 2021-04-26 RX ORDER — VENLAFAXINE HYDROCHLORIDE 75 MG/1
CAPSULE, EXTENDED RELEASE ORAL
Qty: 30 CAPSULE | Refills: 1 | Status: SHIPPED | OUTPATIENT
Start: 2021-04-26 | End: 2021-07-06

## 2021-04-26 RX ORDER — AMLODIPINE BESYLATE 5 MG/1
TABLET ORAL
Qty: 30 TABLET | Refills: 0 | Status: SHIPPED | OUTPATIENT
Start: 2021-04-26 | End: 2021-05-27

## 2021-04-26 RX ORDER — ATORVASTATIN CALCIUM 40 MG/1
TABLET, FILM COATED ORAL
Qty: 30 TABLET | Refills: 5 | Status: SHIPPED | OUTPATIENT
Start: 2021-04-26 | End: 2021-10-25

## 2021-05-04 ENCOUNTER — TELEPHONE (OUTPATIENT)
Dept: FAMILY MEDICINE CLINIC | Facility: CLINIC | Age: 51
End: 2021-05-04

## 2021-05-04 NOTE — TELEPHONE ENCOUNTER
Left message for patient to call back and schedule annual physical appt- he is overdue for this as well as other health maintenance

## 2021-05-27 DIAGNOSIS — I10 ESSENTIAL HYPERTENSION: ICD-10-CM

## 2021-05-27 RX ORDER — AMLODIPINE BESYLATE 5 MG/1
TABLET ORAL
Qty: 30 TABLET | Refills: 0 | Status: SHIPPED | OUTPATIENT
Start: 2021-05-27 | End: 2021-07-12

## 2021-07-03 ENCOUNTER — NURSE TRIAGE (OUTPATIENT)
Dept: OTHER | Facility: OTHER | Age: 51
End: 2021-07-03

## 2021-07-03 DIAGNOSIS — F41.9 ANXIETY: Primary | ICD-10-CM

## 2021-07-03 DIAGNOSIS — F41.9 ANXIETY: ICD-10-CM

## 2021-07-03 RX ORDER — VENLAFAXINE HYDROCHLORIDE 75 MG/1
75 TABLET, EXTENDED RELEASE ORAL
Qty: 30 TABLET | Refills: 0 | Status: SHIPPED | OUTPATIENT
Start: 2021-07-03 | End: 2021-07-17

## 2021-07-03 NOTE — TELEPHONE ENCOUNTER
Pt is scheduled with Dr Gage on  @11am  Okay to fill 30 day supply, 0 refills per on-call provider  Reason for Disposition   [1] Prescription refill request for ESSENTIAL medicine (i e , likelihood of harm to patient if not taken) AND [2] triager unable to refill per department policy    Answer Assessment - Initial Assessment Questions  1  DRUG NAME: "What medicine do you need to have refilled?"      venlafaxine (EFFEXOR-XR) 75 mg 24 hr capsule  2  REFILLS REMAINING: "How many refills are remaining?" (Note: The label on the medicine or pill bottle will show how many refills are remaining  If there are no refills remaining, then a renewal may be needed )      0  3  EXPIRATION DATE: "What is the expiration date?" (Note: The label states when the prescription will , and thus can no longer be refilled )      1 day left  4  PRESCRIBING HCP: "Who prescribed it?" Reason: If prescribed by specialist, call should be referred to that group  Dr Gage  5   SYMPTOMS: "Do you have any symptoms?"      Denies, "I'm fine now but I have terrible symptoms if I miss doses "    Protocols used: MEDICATION REFILL AND RENEWAL CALL-[facility]

## 2021-07-06 RX ORDER — VENLAFAXINE HYDROCHLORIDE 75 MG/1
CAPSULE, EXTENDED RELEASE ORAL
Qty: 30 CAPSULE | Refills: 1 | Status: SHIPPED | OUTPATIENT
Start: 2021-07-06 | End: 2021-09-01 | Stop reason: SDUPTHER

## 2021-07-12 DIAGNOSIS — I10 ESSENTIAL HYPERTENSION: ICD-10-CM

## 2021-07-12 RX ORDER — AMLODIPINE BESYLATE 5 MG/1
TABLET ORAL
Qty: 30 TABLET | Refills: 0 | Status: SHIPPED | OUTPATIENT
Start: 2021-07-12 | End: 2021-08-05

## 2021-07-12 NOTE — TELEPHONE ENCOUNTER
Pt called to check on this -- has been out for 2 or 3 days and his bp is going up!! Took it at home and it is 179/110 and then 189/99

## 2021-07-13 DIAGNOSIS — I10 ESSENTIAL HYPERTENSION: ICD-10-CM

## 2021-07-13 RX ORDER — AMLODIPINE BESYLATE 5 MG/1
5 TABLET ORAL DAILY
Qty: 30 TABLET | Refills: 0 | OUTPATIENT
Start: 2021-07-13

## 2021-07-13 NOTE — TELEPHONE ENCOUNTER
Patient called to check on status of amlodipine  Advised medication sent to pharmacy yesterday  Patient states that he will  medication today  States that he did have slight dizziness with his blood pressure being elevated  Advised patient to take medication as soon as he picks it up from pharmacy  Advised if chest pain, shortness of breath, dizziness, or headache arise he should go to ER  Patient verbalized understanding

## 2021-07-17 ENCOUNTER — OFFICE VISIT (OUTPATIENT)
Dept: FAMILY MEDICINE CLINIC | Facility: CLINIC | Age: 51
End: 2021-07-17
Payer: COMMERCIAL

## 2021-07-17 VITALS
HEIGHT: 69 IN | WEIGHT: 225 LBS | DIASTOLIC BLOOD PRESSURE: 88 MMHG | OXYGEN SATURATION: 99 % | HEART RATE: 78 BPM | TEMPERATURE: 97 F | SYSTOLIC BLOOD PRESSURE: 140 MMHG | BODY MASS INDEX: 33.33 KG/M2

## 2021-07-17 DIAGNOSIS — F41.9 ANXIETY: ICD-10-CM

## 2021-07-17 DIAGNOSIS — Z12.11 COLON CANCER SCREENING: Primary | ICD-10-CM

## 2021-07-17 DIAGNOSIS — I10 ESSENTIAL HYPERTENSION: ICD-10-CM

## 2021-07-17 DIAGNOSIS — E78.2 MIXED HYPERLIPIDEMIA: ICD-10-CM

## 2021-07-17 PROCEDURE — 99214 OFFICE O/P EST MOD 30 MIN: CPT | Performed by: FAMILY MEDICINE

## 2021-07-17 RX ORDER — PHENTERMINE HYDROCHLORIDE 37.5 MG/1
37.5 CAPSULE ORAL EVERY MORNING
Qty: 30 CAPSULE | Refills: 0 | Status: SHIPPED | OUTPATIENT
Start: 2021-07-17 | End: 2022-02-25

## 2021-07-17 NOTE — PROGRESS NOTES
Assessment/Plan:         Problem List Items Addressed This Visit        Cardiovascular and Mediastinum    Essential hypertension     Continue amlodipine, lose weight, recheck blood pressure in 6 weeks after starting phentermine  Other    Hyperlipemia     Check CMP, lipid profile         Relevant Orders    Comprehensive metabolic panel    Lipid panel    Anxiety     Continue venlafaxine         BMI 33 0-33 9,adult    Relevant Medications    phentermine 37 5 MG capsule      Other Visit Diagnoses     Colon cancer screening    -  Primary    Relevant Orders    Cologuard        BMI Counseling: Body mass index is 33 23 kg/m²  The BMI is above normal  Nutrition recommendations include decreasing portion sizes and encouraging healthy choices of fruits and vegetables  Exercise recommendations include moderate physical activity 150 minutes/week  No pharmacotherapy was ordered  Subjective:      Patient ID: Philomena Espino is a 46 y o  male  Patient comes in today for checkup on his blood pressure, he is taking his amlodipine  He continues take his venlafaxine for his anxiety  He is concerned about his weight gain and would like something to help him start losing weight  He is also due for blood work  The following portions of the patient's history were reviewed and updated as appropriate:   Past Medical History:  He has a past medical history of Anxiety, CPAP (continuous positive airway pressure) dependence, Depression, Hyperlipidemia, Hypertension, and Sleep apnea ,  _______________________________________________________________________  Medical Problems:  does not have any pertinent problems on file ,  _______________________________________________________________________  Past Surgical History:   has a past surgical history that includes Knee surgery (Left); Appendectomy; pr lap, ventral hernia repair,reducible (N/A, 1/10/2018);  Cardiac catheterization; and pr shldr arthroscop,surg,w/rotat cuff repr (Left, 6/4/2020)  ,  _______________________________________________________________________  Family History:  family history includes Coronary artery disease in his brother, father, and mother; Heart disease in his father; Hypertension in his brother, father, and mother ,  _______________________________________________________________________  Social History:   reports that he has been smoking cigars  He has never used smokeless tobacco  He reports current alcohol use  He reports that he does not use drugs  ,  _______________________________________________________________________  Allergies:  is allergic to betadine [povidone iodine]     _______________________________________________________________________  Current Outpatient Medications   Medication Sig Dispense Refill    amLODIPine (NORVASC) 5 mg tablet take 1 tablet by mouth daily 30 tablet 0    aspirin 81 MG tablet Take 81 tablets by mouth daily      atorvastatin (LIPITOR) 40 mg tablet take 1 tablet by mouth daily 30 tablet 5    venlafaxine (EFFEXOR-XR) 75 mg 24 hr capsule take 1 capsule by mouth once daily 30 capsule 1    phentermine 37 5 MG capsule Take 1 capsule (37 5 mg total) by mouth every morning 30 capsule 0     No current facility-administered medications for this visit      _______________________________________________________________________  Review of Systems   Constitutional: Negative for chills, fatigue and fever  HENT: Negative for congestion, ear pain, hearing loss, postnasal drip, rhinorrhea and sore throat  Eyes: Negative for pain and visual disturbance  Respiratory: Negative for chest tightness, shortness of breath and wheezing  Cardiovascular: Negative for chest pain and leg swelling  Gastrointestinal: Negative for abdominal distention, abdominal pain, constipation, diarrhea and vomiting  Endocrine: Negative for cold intolerance and heat intolerance     Genitourinary: Negative for difficulty urinating, frequency and urgency  Musculoskeletal: Negative for arthralgias and gait problem  Skin: Negative for color change  Neurological: Negative for dizziness, tremors, syncope, numbness and headaches  Hematological: Negative for adenopathy  Psychiatric/Behavioral: Negative for agitation, confusion and sleep disturbance  The patient is not nervous/anxious  Objective:  Vitals:    07/17/21 1123   BP: 140/88   BP Location: Left arm   Patient Position: Sitting   Cuff Size: Large   Pulse: 78   Temp: (!) 97 °F (36 1 °C)   SpO2: 99%   Weight: 102 kg (225 lb)   Height: 5' 9" (1 753 m)     Body mass index is 33 23 kg/m²  Physical Exam  Vitals and nursing note reviewed  Constitutional:       Appearance: He is well-developed  HENT:      Head: Normocephalic  Eyes:      General: No scleral icterus  Conjunctiva/sclera: Conjunctivae normal    Neck:      Thyroid: No thyromegaly  Cardiovascular:      Rate and Rhythm: Normal rate and regular rhythm  Heart sounds: Normal heart sounds  No murmur heard  Pulmonary:      Effort: Pulmonary effort is normal  No respiratory distress  Breath sounds: Normal breath sounds  No wheezing  Abdominal:      General: Bowel sounds are normal  There is no distension  Palpations: Abdomen is soft  Tenderness: There is no abdominal tenderness  Musculoskeletal:         General: No tenderness  Normal range of motion  Cervical back: Normal range of motion  Lymphadenopathy:      Cervical: No cervical adenopathy  Skin:     General: Skin is warm and dry  Coloration: Skin is not pale  Findings: No rash  Neurological:      Mental Status: He is alert and oriented to person, place, and time  Cranial Nerves: No cranial nerve deficit     Psychiatric:         Behavior: Behavior normal

## 2021-08-05 DIAGNOSIS — I10 ESSENTIAL HYPERTENSION: ICD-10-CM

## 2021-08-05 RX ORDER — AMLODIPINE BESYLATE 5 MG/1
TABLET ORAL
Qty: 30 TABLET | Refills: 0 | Status: SHIPPED | OUTPATIENT
Start: 2021-08-05 | End: 2021-09-06

## 2021-09-01 DIAGNOSIS — F41.9 ANXIETY: ICD-10-CM

## 2021-09-01 RX ORDER — VENLAFAXINE HYDROCHLORIDE 75 MG/1
75 CAPSULE, EXTENDED RELEASE ORAL DAILY
Qty: 30 CAPSULE | Refills: 0 | Status: SHIPPED | OUTPATIENT
Start: 2021-09-01 | End: 2021-10-25

## 2021-09-05 DIAGNOSIS — I10 ESSENTIAL HYPERTENSION: ICD-10-CM

## 2021-09-06 RX ORDER — AMLODIPINE BESYLATE 5 MG/1
TABLET ORAL
Qty: 30 TABLET | Refills: 0 | Status: SHIPPED | OUTPATIENT
Start: 2021-09-06 | End: 2021-10-08

## 2021-10-08 DIAGNOSIS — I10 ESSENTIAL HYPERTENSION: ICD-10-CM

## 2021-10-08 RX ORDER — AMLODIPINE BESYLATE 5 MG/1
TABLET ORAL
Qty: 30 TABLET | Refills: 0 | Status: SHIPPED | OUTPATIENT
Start: 2021-10-08 | End: 2021-11-07

## 2021-11-07 DIAGNOSIS — I10 ESSENTIAL HYPERTENSION: ICD-10-CM

## 2021-11-07 RX ORDER — AMLODIPINE BESYLATE 5 MG/1
TABLET ORAL
Qty: 30 TABLET | Refills: 0 | Status: SHIPPED | OUTPATIENT
Start: 2021-11-07 | End: 2021-12-08

## 2021-11-22 DIAGNOSIS — F41.9 ANXIETY: ICD-10-CM

## 2021-11-22 DIAGNOSIS — E78.2 MIXED HYPERLIPIDEMIA: ICD-10-CM

## 2021-11-23 RX ORDER — ATORVASTATIN CALCIUM 40 MG/1
40 TABLET, FILM COATED ORAL DAILY
Qty: 30 TABLET | Refills: 0 | Status: SHIPPED | OUTPATIENT
Start: 2021-11-23 | End: 2021-12-22

## 2021-11-23 RX ORDER — VENLAFAXINE HYDROCHLORIDE 75 MG/1
75 CAPSULE, EXTENDED RELEASE ORAL DAILY
Qty: 30 CAPSULE | Refills: 0 | Status: SHIPPED | OUTPATIENT
Start: 2021-11-23 | End: 2021-12-22

## 2021-12-08 DIAGNOSIS — I10 ESSENTIAL HYPERTENSION: ICD-10-CM

## 2021-12-08 RX ORDER — AMLODIPINE BESYLATE 5 MG/1
TABLET ORAL
Qty: 30 TABLET | Refills: 0 | Status: SHIPPED | OUTPATIENT
Start: 2021-12-08 | End: 2022-01-08

## 2021-12-22 DIAGNOSIS — E78.2 MIXED HYPERLIPIDEMIA: ICD-10-CM

## 2021-12-22 DIAGNOSIS — F41.9 ANXIETY: ICD-10-CM

## 2021-12-22 RX ORDER — ATORVASTATIN CALCIUM 40 MG/1
40 TABLET, FILM COATED ORAL DAILY
Qty: 30 TABLET | Refills: 0 | Status: SHIPPED | OUTPATIENT
Start: 2021-12-22 | End: 2022-01-24

## 2021-12-22 RX ORDER — VENLAFAXINE HYDROCHLORIDE 75 MG/1
75 CAPSULE, EXTENDED RELEASE ORAL DAILY
Qty: 30 CAPSULE | Refills: 0 | Status: SHIPPED | OUTPATIENT
Start: 2021-12-22 | End: 2022-01-24

## 2022-01-08 DIAGNOSIS — I10 ESSENTIAL HYPERTENSION: ICD-10-CM

## 2022-01-08 RX ORDER — AMLODIPINE BESYLATE 5 MG/1
TABLET ORAL
Qty: 30 TABLET | Refills: 0 | Status: SHIPPED | OUTPATIENT
Start: 2022-01-08 | End: 2022-02-04

## 2022-02-25 ENCOUNTER — OFFICE VISIT (OUTPATIENT)
Dept: FAMILY MEDICINE CLINIC | Facility: CLINIC | Age: 52
End: 2022-02-25
Payer: COMMERCIAL

## 2022-02-25 VITALS
HEIGHT: 69 IN | DIASTOLIC BLOOD PRESSURE: 92 MMHG | OXYGEN SATURATION: 95 % | HEART RATE: 86 BPM | BODY MASS INDEX: 33.38 KG/M2 | TEMPERATURE: 98.4 F | WEIGHT: 225.4 LBS | SYSTOLIC BLOOD PRESSURE: 164 MMHG

## 2022-02-25 DIAGNOSIS — I10 ESSENTIAL HYPERTENSION: ICD-10-CM

## 2022-02-25 DIAGNOSIS — J04.0 LARYNGITIS: ICD-10-CM

## 2022-02-25 DIAGNOSIS — Z99.89 USES CONTINUOUS POSITIVE AIRWAY PRESSURE (CPAP) VENTILATION AT HOME: ICD-10-CM

## 2022-02-25 DIAGNOSIS — Z23 ENCOUNTER FOR IMMUNIZATION: Primary | ICD-10-CM

## 2022-02-25 DIAGNOSIS — K43.9 VENTRAL HERNIA WITHOUT OBSTRUCTION OR GANGRENE: ICD-10-CM

## 2022-02-25 DIAGNOSIS — R14.0 ABDOMINAL DISTENTION: ICD-10-CM

## 2022-02-25 PROBLEM — K42.0 INCARCERATED UMBILICAL HERNIA: Chronic | Status: RESOLVED | Noted: 2018-01-10 | Resolved: 2022-02-25

## 2022-02-25 PROCEDURE — 3080F DIAST BP >= 90 MM HG: CPT | Performed by: STUDENT IN AN ORGANIZED HEALTH CARE EDUCATION/TRAINING PROGRAM

## 2022-02-25 PROCEDURE — 90471 IMMUNIZATION ADMIN: CPT

## 2022-02-25 PROCEDURE — 3725F SCREEN DEPRESSION PERFORMED: CPT | Performed by: STUDENT IN AN ORGANIZED HEALTH CARE EDUCATION/TRAINING PROGRAM

## 2022-02-25 PROCEDURE — 4004F PT TOBACCO SCREEN RCVD TLK: CPT | Performed by: STUDENT IN AN ORGANIZED HEALTH CARE EDUCATION/TRAINING PROGRAM

## 2022-02-25 PROCEDURE — 3008F BODY MASS INDEX DOCD: CPT | Performed by: STUDENT IN AN ORGANIZED HEALTH CARE EDUCATION/TRAINING PROGRAM

## 2022-02-25 PROCEDURE — 3077F SYST BP >= 140 MM HG: CPT | Performed by: STUDENT IN AN ORGANIZED HEALTH CARE EDUCATION/TRAINING PROGRAM

## 2022-02-25 PROCEDURE — 99214 OFFICE O/P EST MOD 30 MIN: CPT | Performed by: STUDENT IN AN ORGANIZED HEALTH CARE EDUCATION/TRAINING PROGRAM

## 2022-02-25 PROCEDURE — 90715 TDAP VACCINE 7 YRS/> IM: CPT

## 2022-02-25 RX ORDER — CHLORTHALIDONE 25 MG/1
25 TABLET ORAL DAILY
Qty: 90 TABLET | Refills: 1 | Status: SHIPPED | OUTPATIENT
Start: 2022-02-25

## 2022-02-25 RX ORDER — AZITHROMYCIN 250 MG/1
TABLET, FILM COATED ORAL
Qty: 6 TABLET | Refills: 0 | Status: SHIPPED | OUTPATIENT
Start: 2022-02-25 | End: 2022-03-01

## 2022-02-25 NOTE — PATIENT INSTRUCTIONS
Umbilical Hernia   AMBULATORY CARE:   An umbilical hernia  is a bulge through the abdominal wall near your umbilicus (belly button)  The hernia may contain tissue from the abdomen, part of an organ (such as the intestine), or fluid  Signs and symptoms:  Umbilical hernias usually do not cause any pain  Your hernia may disappear when you lay flat  You may have any of the following:  · A bulge or swelling in or near your belly button    · A bulge that gets bigger when you cough, strain to have a bowel movement, or sit up    · Nausea or vomiting    · Constipation    Seek care immediately if:   · Your hernia gets bigger, feels firm, or turns blue or purple  · You have severe abdominal pain with nausea or vomiting  · You stop having bowel movements and passing gas  · You have blood in your bowel movement  Contact your healthcare provider if:   · You have a fever  · You have nausea or are vomiting  · You are constipated  · You have questions or concerns about your condition or care  Self-care:   · Drink more liquids  Liquids may prevent constipation and straining during a bowel movement  This can prevent your hernia from getting bigger  Ask how much liquid you should drink each day and which liquids are best for you  · Eat high-fiber foods  Fiber may prevent constipation and straining during a bowel movement  This can prevent your hernia from getting bigger  Foods that contain fiber include fruits, vegetables, legumes, and whole grains  · Avoid heavy lifting  Heavy lifting can put pressure on your hernia and make it bigger  Ask your healthcare provider how much is safe to lift  · Do not place anything over your umbilical hernia  Do not place tape or a coin over the hernia  This treatment does not help treat a hernia  Follow up with your healthcare provider as directed: You may need to see a surgeon to plan for hernia repair   Write down your questions so you remember to ask them during your visits  © Copyright [employer] 2022 Information is for End User's use only and may not be sold, redistributed or otherwise used for commercial purposes  All illustrations and images included in CareNotes® are the copyrighted property of A D [employer]  or Petra Treadwell  The above information is an  only  It is not intended as medical advice for individual conditions or treatments  Talk to your doctor, nurse or pharmacist before following any medical regimen to see if it is safe and effective for you  Laryngitis   WHAT YOU NEED TO KNOW:   Laryngitis is when your larynx is swollen because of an infection or irritation  The larynx is also called the voice box because it contains your vocal cords  Your vocal cords also swell and change shape, which can cause your voice to sound different  DISCHARGE INSTRUCTIONS:   Take your medicine as directed  Contact your healthcare provider if you think your medicine is not helping or if you have side effects  Tell him of her if you are allergic to any medicine  Keep a list of the medicines, vitamins, and herbs you take  Include the amounts, and when and why you take them  Bring the list or the pill bottles to follow-up visits  Carry your medicine list with you in case of an emergency  Prevent laryngitis:   · Rest your voice:  Do not shout or scream if you get laryngitis often  This will help prevent swelling and irritation of your larynx  · Avoid irritants and harmful substances:  Do not breathe in chemicals or allergens, such as pollen  Alcohol and tobacco can also irritate your larynx  · Avoid foods and liquids that can cause acid reflux: These may include carbonated drinks, spicy foods and sauces, citrus fruit, peppermint, and chocolate  · Avoid the spread of germs:        ? Wash your hands often with soap and water  Carry germ-killing gel with you   You can use the gel to clean your hands when there is no soap and water available  ? Do not touch your eyes, nose, or mouth unless you have washed your hands first     ? Always cover your mouth when you cough  Cough into a tissue or your shirtsleeve so you do not spread germs from your hands  ? Try to avoid people who have a cold or the flu  If you are sick, stay away from others as much as possible  Follow up with your healthcare provider as directed:  Write down your questions so you remember to ask them during your visits  Contact your healthcare provider if:   · You have a fever  · You feel large, tender lumps in your neck  · You are hoarse for more than 7 days  · You have new or increased throat pain  · You have questions about your condition or care  Return to the emergency department if:   · Your throat is bleeding  · You are hoarse for more than 7 days and your chest feels tight  · You are drooling and have trouble swallowing  · You have trouble breathing  © Copyright [address] Information is for End User's use only and may not be sold, redistributed or otherwise used for commercial purposes  All illustrations and images included in CareNotes® are the copyrighted property of [employer]  or [facility] Jorje Morfin   The above information is an  only  It is not intended as medical advice for individual conditions or treatments  Talk to your doctor, nurse or pharmacist before following any medical regimen to see if it is safe and effective for you

## 2022-02-25 NOTE — PROGRESS NOTES
Assessment/Plan:         Problem List Items Addressed This Visit        Cardiovascular and Mediastinum    Essential hypertension    Relevant Medications    chlorthalidone 25 mg tablet       Other    Incarcerated umbilical hernia (Chronic)      Other Visit Diagnoses     Encounter for immunization    -  Primary    Relevant Orders    TDAP VACCINE GREATER THAN OR EQUAL TO 6YO IM    Abdominal distention        Laryngitis        Relevant Medications    azithromycin (ZITHROMAX) 250 mg tablet    Uses continuous positive airway pressure (CPAP) ventilation at home        Relevant Orders    Cpap New DME        If laryngitis does not improve will recommend he see ENT as he does have a history of occasional cigar use  Will get a new CPAP machine ordered as well  Does appear to have ventral hernia and discussed he would like to wait 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 and will have him see General surgery  Discussed signs or symptoms to watch for    Portions of the record may have been created with voice recognition software  Occasional wrong word or "sound a like" substitutions may have occurred due to the inherent limitations of voice recognition software  Read the chart carefully and recognize, using context, where substitutions have occurred  Subjective:      Patient ID: Betzaida Grissom is a 46 y o  male  HPI    [employer] brand cpap face mask, mirage quatro  cpap machine    Coming as he is concerned he has a hernia in his abdomen again  He reports he had a hernia repaired that was just inferior to his umbilicus  Also states his blood pressure has been elevated  Denies any chest pain trouble breathing and other symptoms besides a hoarse voice and sore throat    Has been talking more due to his political aspirations    The following portions of the patient's history were reviewed and updated as appropriate:   Past Medical History:  He has a past medical history of Anxiety, CAD in native artery (3/9/2015), CPAP (continuous positive airway pressure) dependence, Depression, Hyperlipidemia, Hypertension, and Sleep apnea ,  _______________________________________________________________________  Medical Problems:  does not have any pertinent problems on file ,  _______________________________________________________________________  Past Surgical History:   has a past surgical history that includes Knee surgery (Left); Appendectomy; pr lap, ventral hernia repair,reducible (N/A, 1/10/2018); Cardiac catheterization; and pr shldr arthroscop,surg,w/rotat cuff repr (Left, 6/4/2020)  ,  _______________________________________________________________________  Family History:  family history includes Coronary artery disease in his brother, father, and mother; Heart disease in his father; Hypertension in his brother, father, and mother ,  _______________________________________________________________________  Social History:   reports that he has been smoking cigars  He has never used smokeless tobacco  He reports current alcohol use  He reports that he does not use drugs  ,  _______________________________________________________________________  Allergies:  is allergic to betadine [povidone iodine]     _______________________________________________________________________  Current Outpatient Medications   Medication Sig Dispense Refill    amLODIPine (NORVASC) 5 mg tablet take 1 tablet by mouth once daily 30 tablet 5    aspirin 81 MG tablet Take 81 tablets by mouth daily      atorvastatin (LIPITOR) 40 mg tablet Take 1 tablet (40 mg total) by mouth daily 30 tablet 5    venlafaxine (EFFEXOR-XR) 75 mg 24 hr capsule Take 1 capsule (75 mg total) by mouth daily 30 capsule 5    azithromycin (ZITHROMAX) 250 mg tablet Take 2 tablets today then 1 tablet daily x 4 days 6 tablet 0    chlorthalidone 25 mg tablet Take 1 tablet (25 mg total) by mouth daily 90 tablet 1     No current facility-administered medications for this visit  _______________________________________________________________________  Review of Systems   Constitutional: Negative for chills, fatigue and fever  HENT: Negative for rhinorrhea and sore throat  Eyes: Negative for visual disturbance  Respiratory: Negative for cough and shortness of breath  Cardiovascular: Negative for chest pain and palpitations  Gastrointestinal: Negative for abdominal pain, constipation, diarrhea, nausea and vomiting  Genitourinary: Negative for difficulty urinating, dysuria and frequency  Musculoskeletal: Negative for arthralgias and myalgias  Skin: Negative for color change and rash  Neurological: Negative for weakness and headaches  Objective:  Vitals:    02/25/22 1332   BP: 164/92   BP Location: Left arm   Patient Position: Sitting   Cuff Size: Large   Pulse: 86   Temp: 98 4 °F (36 9 °C)   SpO2: 95%   Weight: 102 kg (225 lb 6 4 oz)   Height: 5' 9" (1 753 m)     Body mass index is 33 29 kg/m²  Physical Exam  Constitutional:       General: He is not in acute distress  Appearance: He is not ill-appearing  HENT:      Head: Normocephalic and atraumatic  Right Ear: External ear normal       Left Ear: External ear normal       Nose: Nose normal  No congestion or rhinorrhea  Mouth/Throat:      Mouth: Mucous membranes are moist       Pharynx: Oropharynx is clear  No oropharyngeal exudate or posterior oropharyngeal erythema  Eyes:      Extraocular Movements: Extraocular movements intact  Conjunctiva/sclera: Conjunctivae normal       Pupils: Pupils are equal, round, and reactive to light  Cardiovascular:      Rate and Rhythm: Normal rate and regular rhythm  Pulses: Normal pulses  Heart sounds: No murmur heard  Pulmonary:      Effort: Pulmonary effort is normal  No respiratory distress  Breath sounds: Normal breath sounds  No wheezing  Chest:      Chest wall: No tenderness     Abdominal:      General: Bowel sounds are normal       Palpations: Abdomen is soft  Tenderness: There is no abdominal tenderness  Hernia: A hernia is present  Hernia is present in the ventral area  Musculoskeletal:         General: Normal range of motion  Cervical back: Normal range of motion  Skin:     General: Skin is warm and dry  Capillary Refill: Capillary refill takes less than 2 seconds  Findings: No rash  Neurological:      General: No focal deficit present  Mental Status: He is alert  Mental status is at baseline

## 2022-03-24 ENCOUNTER — TELEPHONE (OUTPATIENT)
Dept: FAMILY MEDICINE CLINIC | Facility: CLINIC | Age: 52
End: 2022-03-24

## 2022-03-24 DIAGNOSIS — G47.33 OSA (OBSTRUCTIVE SLEEP APNEA): Primary | ICD-10-CM

## 2022-03-24 NOTE — TELEPHONE ENCOUNTER
T/c from pt- reports he was to call Dr Aliyah Ott back with the size mask he needs for a cpap -- he needs medium -- when order is updated, is requesting we fax it to [facility]

## 2022-03-24 NOTE — TELEPHONE ENCOUNTER
Called patient - left voicemail stating that patient is overdue for physical as well as colonoscopy and to call back to schedule

## 2022-03-24 NOTE — TELEPHONE ENCOUNTER
Pt returned call -- is not able to schedule right now, as he is running for political office -- will call back after campaign to schedule - also still has cologuard box at home

## 2022-03-28 NOTE — TELEPHONE ENCOUNTER
Spoke with [facility] -- they have a very low supply of cpap machines and are only filling them for pts who's machines are beyond repair or are over 11years old  Young's wants pt to call Alina Santillan since his old machine is a [employer] and see if it qualifies for the recall and he can get a new machine that way (p# for Alina Santillan - [phone])  Would you like pt to proceed this way or try to find a different DME company to fill order

## 2022-03-28 NOTE — TELEPHONE ENCOUNTER
Notified pt -- is travelling, so requested we send him information via email -- emailed orders and alejandro p# to [email or web address]  [email or web address] and also [email or web address], per his request

## 2022-03-29 ENCOUNTER — TELEPHONE (OUTPATIENT)
Dept: FAMILY MEDICINE CLINIC | Facility: CLINIC | Age: 52
End: 2022-03-29

## 2022-03-29 DIAGNOSIS — H10.33 ACUTE CONJUNCTIVITIS OF BOTH EYES, UNSPECIFIED ACUTE CONJUNCTIVITIS TYPE: Primary | ICD-10-CM

## 2022-03-29 RX ORDER — TOBRAMYCIN 3 MG/ML
1 SOLUTION/ DROPS OPHTHALMIC
Qty: 5 ML | Refills: 0 | Status: SHIPPED | OUTPATIENT
Start: 2022-03-29

## 2022-03-29 NOTE — TELEPHONE ENCOUNTER
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

## 2022-05-23 ENCOUNTER — OFFICE VISIT (OUTPATIENT)
Dept: FAMILY MEDICINE CLINIC | Facility: CLINIC | Age: 52
End: 2022-05-23
Payer: COMMERCIAL

## 2022-05-23 VITALS
BODY MASS INDEX: 33.71 KG/M2 | SYSTOLIC BLOOD PRESSURE: 124 MMHG | OXYGEN SATURATION: 94 % | DIASTOLIC BLOOD PRESSURE: 84 MMHG | WEIGHT: 227.6 LBS | HEIGHT: 69 IN | HEART RATE: 95 BPM | TEMPERATURE: 98.3 F

## 2022-05-23 DIAGNOSIS — F41.9 ANXIETY: ICD-10-CM

## 2022-05-23 DIAGNOSIS — I10 ESSENTIAL HYPERTENSION: Primary | ICD-10-CM

## 2022-05-23 DIAGNOSIS — K42.9 UMBILICAL HERNIA WITHOUT OBSTRUCTION AND WITHOUT GANGRENE: ICD-10-CM

## 2022-05-23 DIAGNOSIS — E78.2 MIXED HYPERLIPIDEMIA: ICD-10-CM

## 2022-05-23 DIAGNOSIS — Z00.00 ANNUAL PHYSICAL EXAM: ICD-10-CM

## 2022-05-23 PROCEDURE — 3008F BODY MASS INDEX DOCD: CPT | Performed by: FAMILY MEDICINE

## 2022-05-23 PROCEDURE — 99396 PREV VISIT EST AGE 40-64: CPT | Performed by: FAMILY MEDICINE

## 2022-05-23 PROCEDURE — 3074F SYST BP LT 130 MM HG: CPT | Performed by: FAMILY MEDICINE

## 2022-05-23 PROCEDURE — 4004F PT TOBACCO SCREEN RCVD TLK: CPT | Performed by: FAMILY MEDICINE

## 2022-05-23 PROCEDURE — 3079F DIAST BP 80-89 MM HG: CPT | Performed by: FAMILY MEDICINE

## 2022-05-23 NOTE — PATIENT INSTRUCTIONS

## 2022-05-23 NOTE — PROGRESS NOTES
[address]    NAME: Christopher Salgado  AGE: 46 y o  SEX: male  : 1970     DATE: 2022     Assessment and Plan:     Problem List Items Addressed This Visit        Cardiovascular and Mediastinum    Essential hypertension - Primary     Better controlled, continue amlodipine and chlorthalidone              Other    Hyperlipemia      Controlled, continue atorvastatin           Anxiety      Doing well, continue the venlafaxine             Other Visit Diagnoses     Annual physical exam        Umbilical hernia without obstruction and without gangrene        Relevant Orders    Ambulatory referral to General Surgery          Immunizations and preventive care screenings were discussed with patient today  Appropriate education was printed on patient's after visit summary  Counseling:  Alcohol/drug use: discussed moderation in alcohol intake, the recommendations for healthy alcohol use, and avoidance of illicit drug use  Dental Health: discussed importance of regular tooth brushing, flossing, and dental visits  Return in 6 months (on 2022)  Chief Complaint:     Chief Complaint   Patient presents with    Follow-up     6 week due for annual physical       History of Present Illness:     Adult Annual Physical   Patient here for a comprehensive physical exam  The patient reports no problems  Diet and Physical Activity  Diet/Nutrition: well balanced diet  Exercise: no formal exercise  Depression Screening  PHQ-2/9 Depression Screening         General Health  Sleep: sleeps well  Hearing: normal - bilateral   Vision: no vision problems  Dental: regular dental visits   Health  Symptoms include: none     Review of Systems:     Review of Systems   Constitutional: Negative for chills, fatigue and fever     HENT: Negative for congestion, ear pain, hearing loss, postnasal drip, rhinorrhea and sore throat  Eyes: Negative for pain and visual disturbance  Respiratory: Negative for chest tightness, shortness of breath and wheezing  Cardiovascular: Negative for chest pain and leg swelling  Gastrointestinal: Negative for abdominal distention, abdominal pain, constipation, diarrhea and vomiting  Endocrine: Negative for cold intolerance and heat intolerance  Genitourinary: Negative for difficulty urinating, frequency and urgency  Musculoskeletal: Negative for arthralgias and gait problem  Skin: Negative for color change  Neurological: Negative for dizziness, tremors, syncope, numbness and headaches  Hematological: Negative for adenopathy  Psychiatric/Behavioral: Negative for agitation, confusion and sleep disturbance  The patient is not nervous/anxious         Past Medical History:     Past Medical History:   Diagnosis Date    Anxiety     CAD in native artery 3/9/2015    CPAP (continuous positive airway pressure) dependence     Depression     Hyperlipidemia     Hypertension     Incarcerated umbilical hernia 2/62/0204    Sleep apnea       Past Surgical History:     Past Surgical History:   Procedure Laterality Date    APPENDECTOMY      CARDIAC CATHETERIZATION      KNEE SURGERY Left     CO LAP, VENTRAL HERNIA REPAIR,REDUCIBLE N/A 1/10/2018    Procedure: LAPAROSCOPIC UMBILICAL HERNIA REPAIR WITH MESH;  Surgeon: Flores Billy MD;  Location: [facility];  Service: General    CO SHLDR ARTHROSCOP,SURG,W/ROTAT CUFF REPR Left 6/4/2020    Procedure: Left shoulder arthroscopic rotator cuff repair, possible biceps tenodesis vs biceps tenodesis open;  Surgeon: Stan Ma MD;  Location: [facility];  Service: Orthopedics      Family History:     Family History   Problem Relation Age of Onset    Hypertension Mother     Coronary artery disease Mother     Hypertension Father     Heart disease Father     Coronary artery disease Father     Coronary artery disease Brother     Hypertension Brother       Social History:     Social History     Socioeconomic History    Marital status: /Civil Union     Spouse name: None    Number of children: None    Years of education: None    Highest education level: None   Occupational History    None   Tobacco Use    Smoking status: Current Some Day Smoker     Types: Cigars    Smokeless tobacco: Never Used    Tobacco comment: rare   Vaping Use    Vaping Use: Never used   Substance and Sexual Activity    Alcohol use: Yes     Comment: rarely    Drug use: No    Sexual activity: None   Other Topics Concern    None   Social History Narrative    None     Social Determinants of Health     Financial Resource Strain: Not on file   Food Insecurity: Not on file   Transportation Needs: Not on file   Physical Activity: Not on file   Stress: Not on file   Social Connections: Not on file   Intimate Partner Violence: Not on file   Housing Stability: Not on file      Current Medications:     Current Outpatient Medications   Medication Sig Dispense Refill    amLODIPine (NORVASC) 5 mg tablet take 1 tablet by mouth once daily 30 tablet 5    aspirin 81 MG tablet Take 81 tablets by mouth daily      atorvastatin (LIPITOR) 40 mg tablet Take 1 tablet (40 mg total) by mouth daily 30 tablet 5    chlorthalidone 25 mg tablet Take 1 tablet (25 mg total) by mouth daily 90 tablet 1    tobramycin (Tobrex) 0 3 % SOLN Administer 1 drop to both eyes every 4 (four) hours while awake 5 mL 0    venlafaxine (EFFEXOR-XR) 75 mg 24 hr capsule Take 1 capsule (75 mg total) by mouth daily 30 capsule 5     No current facility-administered medications for this visit  Allergies:      Allergies   Allergen Reactions    Betadine [Povidone Iodine] Rash      Physical Exam:     /84   Pulse 95   Temp 98 3 °F (36 8 °C) (Tympanic)   Ht 5' 9" (1 753 m)   Wt 103 kg (227 lb 9 6 oz)   SpO2 94%   BMI 33 61 kg/m²     Physical Exam  Constitutional: Appearance: He is well-developed  HENT:      Head: Normocephalic  Right Ear: External ear normal       Left Ear: External ear normal       Nose: Nose normal    Eyes:      Conjunctiva/sclera: Conjunctivae normal       Pupils: Pupils are equal, round, and reactive to light  Neck:      Thyroid: No thyromegaly  Cardiovascular:      Rate and Rhythm: Normal rate and regular rhythm  Heart sounds: Normal heart sounds  Pulmonary:      Effort: Pulmonary effort is normal       Breath sounds: Normal breath sounds  Abdominal:      General: Bowel sounds are normal       Palpations: Abdomen is soft  Musculoskeletal:         General: Normal range of motion  Cervical back: Normal range of motion  Skin:     General: Skin is warm and dry  Neurological:      Mental Status: He is alert and oriented to person, place, and time     Psychiatric:         Behavior: Behavior normal           DO Carlo River [id number] [facility]

## 2022-06-02 ENCOUNTER — TELEPHONE (OUTPATIENT)
Dept: FAMILY MEDICINE CLINIC | Facility: CLINIC | Age: 52
End: 2022-06-02

## 2022-07-23 DIAGNOSIS — E78.2 MIXED HYPERLIPIDEMIA: ICD-10-CM

## 2022-07-23 DIAGNOSIS — F41.9 ANXIETY: ICD-10-CM

## 2022-07-23 RX ORDER — ATORVASTATIN CALCIUM 40 MG/1
40 TABLET, FILM COATED ORAL DAILY
Qty: 30 TABLET | Refills: 5 | Status: SHIPPED | OUTPATIENT
Start: 2022-07-23 | End: 2022-08-22

## 2022-07-23 RX ORDER — VENLAFAXINE HYDROCHLORIDE 75 MG/1
75 CAPSULE, EXTENDED RELEASE ORAL DAILY
Qty: 30 CAPSULE | Refills: 5 | Status: SHIPPED | OUTPATIENT
Start: 2022-07-23 | End: 2022-08-22

## 2022-08-07 ENCOUNTER — NURSE TRIAGE (OUTPATIENT)
Dept: OTHER | Facility: OTHER | Age: 52
End: 2022-08-07

## 2022-08-07 NOTE — TELEPHONE ENCOUNTER
Regarding: Hurt leg  ----- Message from Mario Skye sent at 8/7/2022 10:31 AM EDT -----  "I hurt my leg over a week ago and I'm still having issues with my ankle all the way up to my knee "

## 2022-08-07 NOTE — TELEPHONE ENCOUNTER
Reason for Disposition   [1] After 3 days AND [2] still limping    Answer Assessment - Initial Assessment Questions  1  MECHANISM: "How did the injury happen?" (e g , twisting injury, direct blow)       Running and jammed leg and ankle when playing kick ball    2  ONSET: "When did the injury happen?" (Minutes or hours ago)       A week ago    3  LOCATION: "Where is the injury located?"     Left leg     4  SEVERITY: "Can you put weight on that leg?" "Can you walk?"      Yes, but patient is limping    5  SIZE: For cuts, bruises, or swelling, ask: "How large is it?" (e g , inches or centimeters)      Mild swelling around knee    6  PAIN: "Is there pain?" If Yes, ask: "How bad is the pain?"  (Scale 1-10; or mild, moderate, severe)   mild pain    7   TETANUS: For any breaks in the skin, ask: "When was the last tetanus booster?"  n/a  8  OTHER SYMPTOMS: "Do you have any other symptoms?"    no    Protocols used: LEG INJURY-ADULT-

## 2022-08-08 ENCOUNTER — TELEPHONE (OUTPATIENT)
Dept: FAMILY MEDICINE CLINIC | Facility: CLINIC | Age: 52
End: 2022-08-08

## 2022-08-08 DIAGNOSIS — M79.672 PAIN OF LEFT HEEL: Primary | ICD-10-CM

## 2022-08-08 RX ORDER — MELOXICAM 15 MG/1
15 TABLET ORAL DAILY
Qty: 30 TABLET | Refills: 0 | Status: SHIPPED | OUTPATIENT
Start: 2022-08-08 | End: 2022-08-17

## 2022-08-08 NOTE — TELEPHONE ENCOUNTER
Patient came to office looking for an appointment,   Patient was scheduled incorrectly from [address] on 8/29 but thought it was for today  Patient c/o left heel and left knee pain  Started a week ago  Swollen Knee and patient said he feel crackling in the knee  He also explained there is a sharp pain shooting up from heel into the knee  What do you recommend?

## 2022-08-17 ENCOUNTER — OFFICE VISIT (OUTPATIENT)
Dept: FAMILY MEDICINE CLINIC | Facility: CLINIC | Age: 52
End: 2022-08-17
Payer: COMMERCIAL

## 2022-08-17 ENCOUNTER — HOSPITAL ENCOUNTER (OUTPATIENT)
Dept: RADIOLOGY | Facility: HOSPITAL | Age: 52
Discharge: HOME | End: 2022-08-17
Payer: COMMERCIAL

## 2022-08-17 VITALS
HEART RATE: 75 BPM | WEIGHT: 214 LBS | SYSTOLIC BLOOD PRESSURE: 116 MMHG | OXYGEN SATURATION: 100 % | HEIGHT: 69 IN | DIASTOLIC BLOOD PRESSURE: 74 MMHG | TEMPERATURE: 97.7 F | BODY MASS INDEX: 31.7 KG/M2

## 2022-08-17 DIAGNOSIS — M25.462 PAIN AND SWELLING OF LEFT KNEE: ICD-10-CM

## 2022-08-17 DIAGNOSIS — M25.562 PAIN AND SWELLING OF LEFT KNEE: ICD-10-CM

## 2022-08-17 DIAGNOSIS — M79.672 PAIN OF LEFT HEEL: Primary | ICD-10-CM

## 2022-08-17 DIAGNOSIS — M79.672 PAIN OF LEFT HEEL: ICD-10-CM

## 2022-08-17 PROCEDURE — 3074F SYST BP LT 130 MM HG: CPT | Performed by: PHYSICIAN ASSISTANT

## 2022-08-17 PROCEDURE — 73610 X-RAY EXAM OF ANKLE: CPT

## 2022-08-17 PROCEDURE — 99214 OFFICE O/P EST MOD 30 MIN: CPT | Performed by: PHYSICIAN ASSISTANT

## 2022-08-17 PROCEDURE — 3078F DIAST BP <80 MM HG: CPT | Performed by: PHYSICIAN ASSISTANT

## 2022-08-17 PROCEDURE — 73564 X-RAY EXAM KNEE 4 OR MORE: CPT

## 2022-08-17 PROCEDURE — 3725F SCREEN DEPRESSION PERFORMED: CPT | Performed by: PHYSICIAN ASSISTANT

## 2022-08-17 RX ORDER — DICLOFENAC SODIUM 75 MG/1
75 TABLET, DELAYED RELEASE ORAL 2 TIMES DAILY
Qty: 60 TABLET | Refills: 2 | Status: SHIPPED | OUTPATIENT
Start: 2022-08-17

## 2022-08-17 NOTE — PROGRESS NOTES
Assessment/Plan:  BMI Counseling: Body mass index is 31 6 kg/m²  The BMI is above normal  Nutrition recommendations include encouraging healthy choices of fruits and vegetables, consuming healthier snacks, limiting drinks that contain sugar, moderation in carbohydrate intake, increasing intake of lean protein, reducing intake of saturated and trans fat and reducing intake of cholesterol  Exercise recommendations include moderate physical activity 150 minutes/week  Rationale for BMI follow-up plan is due to patient being overweight or obese  Depression Screening and Follow-up Plan: Patient was screened for depression during today's encounter  They screened negative with a PHQ-2 score of 0  Diagnoses and all orders for this visit:    Pain of left heel  -     XR ankle 3+ vw left; Future  -     diclofenac (VOLTAREN) 75 mg EC tablet; Take 1 tablet (75 mg total) by mouth 2 (two) times a day    Pain and swelling of left knee  -     XR knee 4+ vw left injury; Future  -     diclofenac (VOLTAREN) 75 mg EC tablet; Take 1 tablet (75 mg total) by mouth 2 (two) times a day            Subjective:      Patient ID: Rodríguez Rincon is a 45 y o  male  Leg Pain   The incident occurred more than 1 week ago  [place]  The injury mechanism was a direct blow  The pain is present in the left heel and left knee  The pain is moderate  The pain has been fluctuating since onset  Pertinent negatives include no inability to bear weight, loss of motion, loss of sensation, muscle weakness, numbness or tingling  He reports no foreign bodies present  The symptoms are aggravated by weight bearing and movement  He has tried NSAIDs for the symptoms  The treatment provided no relief         The following portions of the patient's history were reviewed and updated as appropriate:   He has a past medical history of Anxiety, CAD in native artery (3/9/2015), CPAP (continuous positive airway pressure) dependence, Depression, Hyperlipidemia, Hypertension, Incarcerated umbilical hernia (9/50/0785), and Sleep apnea  ,  does not have any pertinent problems on file  ,   has a past surgical history that includes Knee surgery (Left); Appendectomy; pr lap, ventral hernia repair,reducible (N/A, 1/10/2018); Cardiac catheterization; and pr shldr arthroscop,surg,w/rotat cuff repr (Left, 6/4/2020)  ,  family history includes Coronary artery disease in his brother, father, and mother; Heart disease in his father; Hypertension in his brother, father, and mother  ,   reports that he has been smoking cigars  He has never used smokeless tobacco  He reports current alcohol use  He reports that he does not use drugs  ,  is allergic to betadine [povidone iodine]     Current Outpatient Medications   Medication Sig Dispense Refill    amLODIPine (NORVASC) 5 mg tablet take 1 tablet by mouth once daily 30 tablet 5    aspirin 81 MG tablet Take 81 tablets by mouth daily      atorvastatin (LIPITOR) 40 mg tablet Take 1 tablet (40 mg total) by mouth daily 30 tablet 5    chlorthalidone 25 mg tablet Take 1 tablet (25 mg total) by mouth daily 30 tablet 0    diclofenac (VOLTAREN) 75 mg EC tablet Take 1 tablet (75 mg total) by mouth 2 (two) times a day 60 tablet 2    venlafaxine (EFFEXOR-XR) 75 mg 24 hr capsule Take 1 capsule (75 mg total) by mouth daily 30 capsule 5    tobramycin (Tobrex) 0 3 % SOLN Administer 1 drop to both eyes every 4 (four) hours while awake (Patient not taking: Reported on 8/17/2022) 5 mL 0     No current facility-administered medications for this visit  Review of Systems   Constitutional: Negative for chills and fever  Musculoskeletal: Positive for arthralgias, joint swelling and myalgias  Skin: Negative for color change  Neurological: Negative for tingling and numbness           Objective:  Vitals:    08/17/22 0833   BP: 116/74   BP Location: Left arm   Patient Position: Sitting   Cuff Size: Adult   Pulse: 75   Temp: 97 7 °F (36 5 °C)   TempSrc: Tympanic   SpO2: 100%   Weight: 97 1 kg (214 lb)   Height: 5' 9" (1 753 m)     Body mass index is 31 6 kg/m²  Physical Exam  Vitals and nursing note reviewed  Constitutional:       Appearance: Normal appearance  Musculoskeletal:      Left knee: No bony tenderness  Decreased range of motion  Tenderness present over the MCL  Right lower leg: No edema  Left lower leg: No edema  Right foot: Normal range of motion and normal capillary refill  Tenderness present  No swelling, deformity or foot drop  Normal pulse  Left foot: Normal    Neurological:      Mental Status: He is alert

## 2022-08-22 ENCOUNTER — TELEPHONE (OUTPATIENT)
Dept: FAMILY MEDICINE CLINIC | Facility: CLINIC | Age: 52
End: 2022-08-22

## 2022-08-22 NOTE — TELEPHONE ENCOUNTER
Patient called to inquire about xray of feet/ankle and knee - read to him Mandy's comments and he wants to know what he can do for the heel spurs and anything else for the arthitic knee pain? Please advise

## 2022-08-22 NOTE — TELEPHONE ENCOUNTER
As of now he can take the antiinflammatory I prescribed and try inserts for cushioning  He can make appointment with one of the other providers for knee injection

## 2022-08-25 DIAGNOSIS — I10 ESSENTIAL HYPERTENSION: ICD-10-CM

## 2022-08-25 RX ORDER — AMLODIPINE BESYLATE 5 MG/1
TABLET ORAL
Qty: 30 TABLET | Refills: 5 | Status: SHIPPED | OUTPATIENT
Start: 2022-08-25

## 2022-08-29 ENCOUNTER — OFFICE VISIT (OUTPATIENT)
Dept: FAMILY MEDICINE CLINIC | Facility: CLINIC | Age: 52
End: 2022-08-29
Payer: COMMERCIAL

## 2022-08-29 VITALS
DIASTOLIC BLOOD PRESSURE: 100 MMHG | HEIGHT: 69 IN | HEART RATE: 86 BPM | WEIGHT: 214 LBS | SYSTOLIC BLOOD PRESSURE: 138 MMHG | OXYGEN SATURATION: 97 % | BODY MASS INDEX: 31.7 KG/M2

## 2022-08-29 DIAGNOSIS — M25.462 PAIN AND SWELLING OF LEFT KNEE: Primary | ICD-10-CM

## 2022-08-29 DIAGNOSIS — M72.2 PLANTAR FASCIITIS: ICD-10-CM

## 2022-08-29 DIAGNOSIS — M25.562 PAIN AND SWELLING OF LEFT KNEE: Primary | ICD-10-CM

## 2022-08-29 PROBLEM — M79.672 PAIN OF LEFT HEEL: Status: RESOLVED | Noted: 2022-08-17 | Resolved: 2022-08-29

## 2022-08-29 PROCEDURE — 99213 OFFICE O/P EST LOW 20 MIN: CPT | Performed by: FAMILY MEDICINE

## 2022-08-29 RX ORDER — METHYLPREDNISOLONE 4 MG/1
TABLET ORAL
Qty: 21 TABLET | Refills: 0 | Status: SHIPPED | OUTPATIENT
Start: 2022-08-29 | End: 2022-09-04

## 2022-08-29 NOTE — PROGRESS NOTES
Assessment/Plan:         Problem List Items Addressed This Visit        Other    Pain and swelling of left knee - Primary    Relevant Medications    methylPREDNISolone 4 MG tablet therapy pack      Other Visit Diagnoses     Plantar fasciitis        Relevant Medications    methylPREDNISolone 4 MG tablet therapy pack          Stretching exercises for the ankle and heel, he is to let us know when he finishes the prednisone if this is helping or not  Subjective:      Patient ID: Fidelina Whitney is a 45 y o  male  Patient comes in today complaining of pain in his left knee over the medial aspect  He states he has had this for several weeks after playing a game of control  He denies any trauma during the activity  He has been using diclofenac with some mild relief  He is also complaining of pain in his left ankle and heel  Worse when he is standing on his feet for long periods of time  The following portions of the patient's history were reviewed and updated as appropriate:   Past Medical History:  He has a past medical history of Anxiety, CAD in native artery (3/9/2015), CPAP (continuous positive airway pressure) dependence, Depression, Hyperlipidemia, Hypertension, Incarcerated umbilical hernia (8/09/2525), and Sleep apnea ,  _______________________________________________________________________  Medical Problems:  does not have any pertinent problems on file ,  _______________________________________________________________________  Past Surgical History:   has a past surgical history that includes Knee surgery (Left); Appendectomy; pr lap, ventral hernia repair,reducible (N/A, 1/10/2018); Cardiac catheterization; and pr shldr arthroscop,surg,w/rotat cuff repr (Left, 6/4/2020)  ,  _______________________________________________________________________  Family History:  family history includes Coronary artery disease in his brother, father, and mother; Heart disease in his father; Hypertension in his brother, father, and mother ,  _______________________________________________________________________  Social History:   reports that he has been smoking cigars  He has never used smokeless tobacco  He reports current alcohol use  He reports that he does not use drugs  ,  _______________________________________________________________________  Allergies:  is allergic to betadine [povidone iodine]     _______________________________________________________________________  Current Outpatient Medications   Medication Sig Dispense Refill    amLODIPine (NORVASC) 5 mg tablet take 1 tablet by mouth once daily 30 tablet 5    aspirin 81 MG tablet Take 81 tablets by mouth daily      methylPREDNISolone 4 MG tablet therapy pack Use as directed on package 21 tablet 0    atorvastatin (LIPITOR) 40 mg tablet Take 1 tablet (40 mg total) by mouth daily 30 tablet 5    chlorthalidone 25 mg tablet Take 1 tablet (25 mg total) by mouth daily 30 tablet 0    diclofenac (VOLTAREN) 75 mg EC tablet Take 1 tablet (75 mg total) by mouth 2 (two) times a day 60 tablet 2    tobramycin (Tobrex) 0 3 % SOLN Administer 1 drop to both eyes every 4 (four) hours while awake (Patient not taking: Reported on 8/17/2022) 5 mL 0    venlafaxine (EFFEXOR-XR) 75 mg 24 hr capsule Take 1 capsule (75 mg total) by mouth daily 30 capsule 5     No current facility-administered medications for this visit      _______________________________________________________________________  Review of Systems   Constitutional: Negative for chills, fatigue and fever  HENT: Negative for congestion, ear pain, hearing loss, postnasal drip, rhinorrhea and sore throat  Eyes: Negative for pain and visual disturbance  Respiratory: Negative for chest tightness, shortness of breath and wheezing  Cardiovascular: Negative for chest pain and leg swelling  Gastrointestinal: Negative for abdominal distention, abdominal pain, constipation, diarrhea and vomiting     Endocrine: Negative for cold intolerance and heat intolerance  Genitourinary: Negative for difficulty urinating, frequency and urgency  Musculoskeletal: Positive for arthralgias (left knee, left ankle)  Negative for gait problem  Skin: Negative for color change  Neurological: Negative for dizziness, tremors, syncope, numbness and headaches  Hematological: Negative for adenopathy  Psychiatric/Behavioral: Negative for agitation, confusion and sleep disturbance  The patient is not nervous/anxious  Objective:  Vitals:    08/29/22 1436   BP: 138/100   Pulse: 86   SpO2: 97%   Weight: 97 1 kg (214 lb)   Height: 5' 9" (1 753 m)     Body mass index is 31 6 kg/m²  Physical Exam  Vitals and nursing note reviewed  Constitutional:       Appearance: He is well-developed  HENT:      Head: Normocephalic  Neck:      Thyroid: No thyromegaly  Abdominal:      General: Bowel sounds are normal  There is no distension  Palpations: Abdomen is soft  Tenderness: There is no abdominal tenderness  Musculoskeletal:         General: No tenderness  Normal range of motion  Skin:     General: Skin is warm and dry  Coloration: Skin is not pale  Findings: No rash  Neurological:      Mental Status: He is alert and oriented to person, place, and time  Cranial Nerves: No cranial nerve deficit     Psychiatric:         Behavior: Behavior normal

## 2022-10-27 ENCOUNTER — TELEPHONE (OUTPATIENT)
Dept: FAMILY MEDICINE CLINIC | Facility: CLINIC | Age: 52
End: 2022-10-27

## 2022-10-27 DIAGNOSIS — M25.462 PAIN AND SWELLING OF LEFT KNEE: Primary | ICD-10-CM

## 2022-10-27 DIAGNOSIS — M25.562 PAIN AND SWELLING OF LEFT KNEE: Primary | ICD-10-CM

## 2022-10-27 NOTE — TELEPHONE ENCOUNTER
T/c from pt - pt knee is not getting better - cannot sleep at night because of the pain - thinks he should get an MRI   Please advise    Call back

## 2022-11-03 ENCOUNTER — HOSPITAL ENCOUNTER (OUTPATIENT)
Dept: MRI IMAGING | Facility: HOSPITAL | Age: 52
Discharge: HOME | End: 2022-11-03

## 2022-11-03 DIAGNOSIS — M25.462 PAIN AND SWELLING OF LEFT KNEE: ICD-10-CM

## 2022-11-03 DIAGNOSIS — M25.562 PAIN AND SWELLING OF LEFT KNEE: ICD-10-CM

## 2022-11-08 LAB
DME PARACHUTE DELIVERY DATE ACTUAL: NORMAL
DME PARACHUTE DELIVERY DATE EXPECTED: NORMAL
DME PARACHUTE DELIVERY DATE REQUESTED: NORMAL
DME PARACHUTE ITEM DESCRIPTION: NORMAL
DME PARACHUTE ITEM DESCRIPTION: NORMAL
DME PARACHUTE ORDER STATUS: NORMAL
DME PARACHUTE SUPPLIER NAME: NORMAL
DME PARACHUTE SUPPLIER PHONE: NORMAL

## 2022-11-11 ENCOUNTER — TELEPHONE (OUTPATIENT)
Dept: FAMILY MEDICINE CLINIC | Facility: CLINIC | Age: 52
End: 2022-11-11

## 2022-11-11 ENCOUNTER — VBI (OUTPATIENT)
Dept: ADMINISTRATIVE | Facility: OTHER | Age: 52
End: 2022-11-11

## 2022-11-11 DIAGNOSIS — M25.462 PAIN AND SWELLING OF LEFT KNEE: Primary | ICD-10-CM

## 2022-11-11 DIAGNOSIS — M25.562 PAIN AND SWELLING OF LEFT KNEE: Primary | ICD-10-CM

## 2022-11-11 RX ORDER — MELOXICAM 15 MG/1
15 TABLET ORAL DAILY
Qty: 30 TABLET | Refills: 1 | Status: SHIPPED | OUTPATIENT
Start: 2022-11-11

## 2022-11-11 NOTE — TELEPHONE ENCOUNTER
Patient is in the process of setting up his appt with [facility] ortho he would like to know if there is anything stronger than Advil for his pain  He doesn't want anything that will knock him out

## 2022-11-11 NOTE — TELEPHONE ENCOUNTER
Spoke with pt, he is aware that a medication was sent to the pharmacy for him       Demarco Ramirez/samra  11/11/22  12:46 PM

## 2022-11-14 ENCOUNTER — TELEPHONE (OUTPATIENT)
Dept: FAMILY MEDICINE CLINIC | Facility: CLINIC | Age: 52
End: 2022-11-14

## 2022-11-14 DIAGNOSIS — M25.562 PAIN AND SWELLING OF LEFT KNEE: Primary | ICD-10-CM

## 2022-11-14 DIAGNOSIS — M25.462 PAIN AND SWELLING OF LEFT KNEE: Primary | ICD-10-CM

## 2022-11-14 NOTE — TELEPHONE ENCOUNTER
T/c from pt - asking for referral for Orthopedic  Pt states he needs surgery, but will need a scope first  Pt asking if Dr Jono Yen can place the referral for [facility]  Please email to referral  [email or web address]

## 2022-11-15 ENCOUNTER — TELEPHONE (OUTPATIENT)
Dept: OBGYN CLINIC | Facility: HOSPITAL | Age: 52
End: 2022-11-15

## 2022-11-15 NOTE — TELEPHONE ENCOUNTER
Caller: Patient    Doctor: Orthopedic surgeon/Raj Garcia/Eladio/Alexi    Reason for call: Patient called to schedule appt with Orthopedic surgeon  He had to step into meeting and will call back to schedule      Call back#: [phone]

## 2022-11-28 ENCOUNTER — OFFICE VISIT (OUTPATIENT)
Dept: FAMILY MEDICINE CLINIC | Facility: CLINIC | Age: 52
End: 2022-11-28

## 2022-11-28 VITALS
SYSTOLIC BLOOD PRESSURE: 150 MMHG | HEIGHT: 69 IN | OXYGEN SATURATION: 96 % | WEIGHT: 218.2 LBS | DIASTOLIC BLOOD PRESSURE: 90 MMHG | TEMPERATURE: 96.9 F | BODY MASS INDEX: 32.32 KG/M2 | HEART RATE: 111 BPM

## 2022-11-28 DIAGNOSIS — M25.462 PAIN AND SWELLING OF LEFT KNEE: ICD-10-CM

## 2022-11-28 DIAGNOSIS — E78.2 MIXED HYPERLIPIDEMIA: ICD-10-CM

## 2022-11-28 DIAGNOSIS — F41.9 ANXIETY: ICD-10-CM

## 2022-11-28 DIAGNOSIS — M25.562 PAIN AND SWELLING OF LEFT KNEE: ICD-10-CM

## 2022-11-28 DIAGNOSIS — I10 ESSENTIAL HYPERTENSION: Primary | ICD-10-CM

## 2022-11-28 RX ORDER — AMLODIPINE BESYLATE 10 MG/1
10 TABLET ORAL DAILY
Qty: 30 TABLET | Refills: 3 | Status: SHIPPED | OUTPATIENT
Start: 2022-11-28

## 2022-11-28 NOTE — PROGRESS NOTES
Name: Chirag Garcia      : 1970      MRN: [id number]  Encounter Provider: Rosie Duran DO  Encounter Date: 2022   Encounter department: [facility] [place] [address]     1  Essential hypertension  Assessment & Plan:  Increase the amlodipine to 10 mg p o  daily, recheck his blood pressure in 4 months    Orders:  -     amLODIPine (NORVASC) 10 mg tablet; Take 1 tablet (10 mg total) by mouth daily    2  Mixed hyperlipidemia  Assessment & Plan:   Controlled, continue amlodipine      3  Anxiety  Assessment & Plan:   Continue the venlafaxine      4  BMI 33 0-33 9,adult    5  Pain and swelling of left knee      Follow-up with orthopedics as scheduled  Subjective       Patient comes in today for follow-up of blood pressure, he states has been running higher  He is taking amlodipine 5 mg daily  He is scheduled to have knee surgery done in December  Review of Systems   Constitutional: Negative for chills, fatigue and fever  HENT: Negative for congestion, ear pain, hearing loss, postnasal drip, rhinorrhea and sore throat  Eyes: Negative for pain and visual disturbance  Respiratory: Negative for chest tightness, shortness of breath and wheezing  Cardiovascular: Negative for chest pain and leg swelling  Gastrointestinal: Negative for abdominal distention, abdominal pain, constipation, diarrhea and vomiting  Endocrine: Negative for cold intolerance and heat intolerance  Genitourinary: Negative for difficulty urinating, frequency and urgency  Musculoskeletal: Positive for arthralgias (knee pain)  Negative for gait problem  Skin: Negative for color change  Neurological: Negative for dizziness, tremors, syncope, numbness and headaches  Hematological: Negative for adenopathy  Psychiatric/Behavioral: Negative for agitation, confusion and sleep disturbance  The patient is not nervous/anxious          Current Outpatient Medications on File Prior to Visit   Medication Sig   • aspirin 81 MG tablet Take 81 tablets by mouth daily   • atorvastatin (LIPITOR) 40 mg tablet Take 1 tablet (40 mg total) by mouth daily   • meloxicam (Mobic) 15 mg tablet Take 1 tablet (15 mg total) by mouth daily   • [DISCONTINUED] amLODIPine (NORVASC) 5 mg tablet take 1 tablet by mouth once daily   • tobramycin (Tobrex) 0 3 % SOLN Administer 1 drop to both eyes every 4 (four) hours while awake (Patient not taking: Reported on 8/17/2022)   • venlafaxine (EFFEXOR-XR) 75 mg 24 hr capsule Take 1 capsule (75 mg total) by mouth daily   • [DISCONTINUED] chlorthalidone 25 mg tablet Take 1 tablet (25 mg total) by mouth daily       Objective     /90 (BP Location: Left arm, Patient Position: Sitting)   Pulse (!) 111   Temp (!) 96 9 °F (36 1 °C) (Tympanic)   Ht 5' 9" (1 753 m)   Wt 99 kg (218 lb 3 2 oz)   SpO2 96%   BMI 32 22 kg/m²     Physical Exam  Vitals and nursing note reviewed  Constitutional:       Appearance: He is well-developed and well-nourished  HENT:      Head: Normocephalic  Mouth/Throat:      Mouth: Oropharynx is clear and moist    Eyes:      General: No scleral icterus  Conjunctiva/sclera: Conjunctivae normal    Neck:      Thyroid: No thyromegaly  Cardiovascular:      Rate and Rhythm: Normal rate and regular rhythm  Heart sounds: Normal heart sounds  No murmur heard  Pulmonary:      Effort: Pulmonary effort is normal  No respiratory distress  Breath sounds: Normal breath sounds  No wheezing  Abdominal:      General: Bowel sounds are normal  There is no distension  Palpations: Abdomen is soft  Tenderness: There is no abdominal tenderness  Musculoskeletal:         General: No tenderness or edema  Normal range of motion  Cervical back: Normal range of motion  Lymphadenopathy:      Cervical: No cervical adenopathy  Skin:     General: Skin is warm and dry  Coloration: Skin is not pale  Findings: No rash  Neurological:      Mental Status: He is alert and oriented to person, place, and time  Cranial Nerves: No cranial nerve deficit     Psychiatric:         Mood and Affect: Mood and affect normal          Behavior: Behavior normal        Georgena Alpers, DO

## 2022-12-08 ENCOUNTER — OFFICE VISIT (OUTPATIENT)
Dept: LAB | Facility: HOSPITAL | Age: 52
End: 2022-12-08

## 2022-12-08 ENCOUNTER — APPOINTMENT (OUTPATIENT)
Dept: LAB | Facility: HOSPITAL | Age: 52
End: 2022-12-08

## 2022-12-08 ENCOUNTER — TELEPHONE (OUTPATIENT)
Dept: FAMILY MEDICINE CLINIC | Facility: CLINIC | Age: 52
End: 2022-12-08

## 2022-12-08 DIAGNOSIS — Z01.89 RADIOLOGICAL EXAMINATION, NOT ELSEWHERE CLASSIFIED: ICD-10-CM

## 2022-12-08 LAB
APTT PPP: 27 SECONDS (ref 23–37)
ATRIAL RATE: 91 BPM
BASOPHILS # BLD AUTO: 0.03 THOUSANDS/ÂΜL (ref 0–0.1)
BASOPHILS NFR BLD AUTO: 0 % (ref 0–1)
EOSINOPHIL # BLD AUTO: 0.17 THOUSAND/ÂΜL (ref 0–0.61)
EOSINOPHIL NFR BLD AUTO: 3 % (ref 0–6)
ERYTHROCYTE [DISTWIDTH] IN BLOOD BY AUTOMATED COUNT: 12.1 % (ref 11.6–15.1)
HCT VFR BLD AUTO: 43.1 % (ref 36.5–49.3)
HGB BLD-MCNC: 14.2 G/DL (ref 12–17)
IMM GRANULOCYTES # BLD AUTO: 0.01 THOUSAND/UL (ref 0–0.2)
IMM GRANULOCYTES NFR BLD AUTO: 0 % (ref 0–2)
INR PPP: 1 (ref 0.84–1.19)
LYMPHOCYTES # BLD AUTO: 1.4 THOUSANDS/ÂΜL (ref 0.6–4.47)
LYMPHOCYTES NFR BLD AUTO: 21 % (ref 14–44)
MCH RBC QN AUTO: 27.7 PG (ref 26.8–34.3)
MCHC RBC AUTO-ENTMCNC: 32.9 G/DL (ref 31.4–37.4)
MCV RBC AUTO: 84 FL (ref 82–98)
MONOCYTES # BLD AUTO: 0.54 THOUSAND/ÂΜL (ref 0.17–1.22)
MONOCYTES NFR BLD AUTO: 8 % (ref 4–12)
NEUTROPHILS # BLD AUTO: 4.57 THOUSANDS/ÂΜL (ref 1.85–7.62)
NEUTS SEG NFR BLD AUTO: 68 % (ref 43–75)
NRBC BLD AUTO-RTO: 0 /100 WBCS
P AXIS: 30 DEGREES
PLATELET # BLD AUTO: 264 THOUSANDS/UL (ref 149–390)
PMV BLD AUTO: 9.4 FL (ref 8.9–12.7)
PR INTERVAL: 160 MS
PROTHROMBIN TIME: 13 SECONDS (ref 11.6–14.5)
QRS AXIS: 93 DEGREES
QRSD INTERVAL: 86 MS
QT INTERVAL: 356 MS
QTC INTERVAL: 437 MS
RBC # BLD AUTO: 5.12 MILLION/UL (ref 3.88–5.62)
T WAVE AXIS: 22 DEGREES
VENTRICULAR RATE: 91 BPM
WBC # BLD AUTO: 6.72 THOUSAND/UL (ref 4.31–10.16)

## 2022-12-08 NOTE — TELEPHONE ENCOUNTER
T/c from Dr Daniel Bowens @ [facility] -- is requesting the ECG results (is being performed at time of call) - be faxed to them when complete, for pts upcoming procedure      Please fax to

## 2023-01-25 ENCOUNTER — TELEPHONE (OUTPATIENT)
Dept: FAMILY MEDICINE CLINIC | Facility: CLINIC | Age: 53
End: 2023-01-25

## 2023-01-25 NOTE — TELEPHONE ENCOUNTER
T/c from pt -- reports he is having family issues/concerns and is hoping he could be given a medication to help with depression as a result  Advised Dr Willy Carlos may need to see him in the office, but a message would be sent first to confirm  Please advise

## 2023-01-26 NOTE — TELEPHONE ENCOUNTER
Pt returned call back - pt notified - pt is asking to up his meds / to send a new script  As per Dr Gage :  He is already on venlafaxine, I would start by increasing that to 2 daily first  If he wants to do this I will send in new rx with the higher dose

## 2023-01-26 NOTE — TELEPHONE ENCOUNTER
He is already on venlafaxine, I would start by increasing that to 2 daily first   If he wants to do this I will send in new rx with the higher dose

## 2023-01-27 DIAGNOSIS — F41.9 ANXIETY: ICD-10-CM

## 2023-01-27 RX ORDER — VENLAFAXINE HYDROCHLORIDE 150 MG/1
150 CAPSULE, EXTENDED RELEASE ORAL DAILY
Qty: 30 CAPSULE | Refills: 5 | Status: SHIPPED | OUTPATIENT
Start: 2023-01-27 | End: 2023-02-26

## 2023-02-14 ENCOUNTER — CONSULT (OUTPATIENT)
Dept: SURGERY | Facility: CLINIC | Age: 53
End: 2023-02-14

## 2023-02-14 VITALS
HEIGHT: 69 IN | OXYGEN SATURATION: 93 % | HEART RATE: 87 BPM | RESPIRATION RATE: 16 BRPM | TEMPERATURE: 98 F | SYSTOLIC BLOOD PRESSURE: 140 MMHG | BODY MASS INDEX: 32.73 KG/M2 | DIASTOLIC BLOOD PRESSURE: 92 MMHG | WEIGHT: 221 LBS

## 2023-02-14 DIAGNOSIS — M62.08 DIASTASIS RECTI: Primary | ICD-10-CM

## 2023-02-14 RX ORDER — HYDROCODONE BITARTRATE AND ACETAMINOPHEN 5; 325 MG/1; MG/1
1 TABLET ORAL
COMMUNITY

## 2023-02-14 NOTE — PROGRESS NOTES
Consult- General Surgery   Hermelinda Kovacs 45 y o  male MRN: [id number]  Unit/Bed#:  Encounter: [id number]    Assessment/Plan     Assessment:  Diastases recti  History of anxiety  History of CAD  History of sleep apnea  History of depression  History of hyperlipidemia  History of hypertension  Plan:  I explained to the patient that diastases recti is not a hernia, no need for surgical intervention since there is no actual hernia, I also explained that this can improve for which I did recommend core exercise and physical therapy to improve diastases, I will defer this to his family physician  No further work-up intervention is needed from a surgical standpoint  History of Present Illness     HPI:  Hermelinda Kovacs is a 45 y o  male who presents with complaints of bulge above the umbilicus  The patient had noted this bulge for quite some time, denies having any abdominal pain, nausea, vomiting or any other constitutional symptoms  The patient is known to me from prior umbilical hernia repair with mesh in 2018  He has not seen a bulge from the umbilicus  The patient went to see his primary care physician and referred to us for surgical evaluation  Review of Systems  The rest of the review of system total of 10 were negative except for the HPI      Historical Information   Past Medical History:   Diagnosis Date   • Anxiety    • CAD in native artery 3/9/2015   • CPAP (continuous positive airway pressure) dependence    • Depression    • Hyperlipidemia    • Hypertension    • Incarcerated umbilical hernia 7/56/6475   • Sleep apnea      Past Surgical History:   Procedure Laterality Date   • APPENDECTOMY     • CARDIAC CATHETERIZATION     • KNEE CARTILAGE SURGERY     • KNEE SURGERY Left    • MS LAPS REPAIR HERNIA EXCEPT INCAL/INGUN REDUCIBLE N/A 01/10/2018    Procedure: LAPAROSCOPIC UMBILICAL HERNIA REPAIR WITH MESH;  Surgeon: Osbaldo Ramirez MD;  Location: [facility];  Service: General   • MS SURGICAL ARTHROSCOPY SHOULDER W/ROTATOR CUFF RPR Left 06/04/2020    Procedure: Left shoulder arthroscopic rotator cuff repair, possible biceps tenodesis vs biceps tenodesis open;  Surgeon: Danii Hitchcock MD;  Location: [facility];  Service: Orthopedics     Social History   Social History     Substance and Sexual Activity   Alcohol Use Yes    Comment: rarely     Social History     Substance and Sexual Activity   Drug Use No     Social History     Tobacco Use   Smoking Status Some Days   • Types: Cigars   Smokeless Tobacco Never   Tobacco Comments    rare     Family History: non-contributory    Meds/Allergies   all medications and allergies reviewed     Current Outpatient Medications:   •  amLODIPine (NORVASC) 10 mg tablet, Take 1 tablet (10 mg total) by mouth daily, Disp: 30 tablet, Rfl: 3  •  aspirin 81 MG tablet, Take 81 tablets by mouth daily, Disp: , Rfl:   •  atorvastatin (LIPITOR) 40 mg tablet, Take 1 tablet (40 mg total) by mouth daily, Disp: 90 tablet, Rfl: 3  •  HYDROcodone-acetaminophen (NORCO) 5-325 mg per tablet, Take 1 tablet by mouth, Disp: , Rfl:   •  meloxicam (Mobic) 15 mg tablet, Take 1 tablet (15 mg total) by mouth daily, Disp: 30 tablet, Rfl: 1  •  tobramycin (Tobrex) 0 3 % SOLN, Administer 1 drop to both eyes every 4 (four) hours while awake, Disp: 5 mL, Rfl: 0  •  venlafaxine (EFFEXOR-XR) 150 mg 24 hr capsule, Take 1 capsule (150 mg total) by mouth daily, Disp: 30 capsule, Rfl: 5  Allergies   Allergen Reactions   • Betadine [Povidone Iodine] Rash       Objective     Current Vitals:   Blood Pressure: 140/92 (no symptoms) (02/14/23 0940)  Pulse: 87 (02/14/23 0940)  Temperature: 98 °F (36 7 °C) (02/14/23 0940)  Temp Source: Temporal (02/14/23 0940)  Respirations: 16 (02/14/23 0940)  Height: 5' 9" (175 3 cm) (02/14/23 0940)  Weight - Scale: 100 kg (221 lb) (02/14/23 0940)  SpO2: 93 % (02/14/23 0940)    Physical Exam  Vitals and nursing note reviewed     Constitutional:       General: He is not in acute distress  Cardiovascular:      Rate and Rhythm: Normal rate and regular rhythm  Heart sounds: No murmur heard  Pulmonary:      Effort: No respiratory distress  Breath sounds: Normal breath sounds  Abdominal:      Comments: Abdomen is soft, nondistended and nontender  There is no palpable ventral or umbilical hernia  There is a bulge in the upper abdomen after flexing the abdominal wall muscles consistent with diastases recti  There is no obvious visceromegaly or mass palpable  Skin:     General: Skin is warm  Coloration: Skin is not jaundiced  Findings: No erythema or rash  Neurological:      Mental Status: He is alert and oriented to person, place, and time  Cranial Nerves: No cranial nerve deficit     Psychiatric:         Mood and Affect: Mood normal          Behavior: Behavior normal

## 2023-03-01 ENCOUNTER — TELEPHONE (OUTPATIENT)
Dept: FAMILY MEDICINE CLINIC | Facility: CLINIC | Age: 53
End: 2023-03-01

## 2023-03-01 DIAGNOSIS — M54.50 ACUTE BILATERAL LOW BACK PAIN WITHOUT SCIATICA: Primary | ICD-10-CM

## 2023-03-01 RX ORDER — CYCLOBENZAPRINE HCL 10 MG
10 TABLET ORAL 3 TIMES DAILY PRN
Qty: 30 TABLET | Refills: 0 | Status: SHIPPED | OUTPATIENT
Start: 2023-03-01

## 2023-03-01 NOTE — TELEPHONE ENCOUNTER
Pt is having muscle spasm in his back to the point that it hurts to breathe - would like muscle relaxer called into pharmacy     Call back Antoinette

## 2023-03-22 DIAGNOSIS — I10 ESSENTIAL HYPERTENSION: ICD-10-CM

## 2023-03-22 RX ORDER — AMLODIPINE BESYLATE 10 MG/1
10 TABLET ORAL DAILY
Qty: 30 TABLET | Refills: 3 | Status: SHIPPED | OUTPATIENT
Start: 2023-03-22

## 2023-04-03 ENCOUNTER — RA CDI HCC (OUTPATIENT)
Dept: OTHER | Facility: HOSPITAL | Age: 53
End: 2023-04-03

## 2023-07-12 ENCOUNTER — TELEPHONE (OUTPATIENT)
Dept: FAMILY MEDICINE CLINIC | Facility: CLINIC | Age: 53
End: 2023-07-12

## 2023-07-12 NOTE — TELEPHONE ENCOUNTER
Received fax from [employer] -- request faxed to [employer] [address].

## 2023-07-12 NOTE — TELEPHONE ENCOUNTER
Marcin Stephens is faxing over a form / Ulysees Dav request for the office to complete for [employer] - I advised Sierra Francis that our fax system is centralized so it will take about 24 hours for the fax to get in to his chart.

## 2023-07-17 DIAGNOSIS — F41.9 ANXIETY: ICD-10-CM

## 2023-07-18 RX ORDER — VENLAFAXINE HYDROCHLORIDE 150 MG/1
CAPSULE, EXTENDED RELEASE ORAL
Qty: 30 CAPSULE | Refills: 5 | Status: SHIPPED | OUTPATIENT
Start: 2023-07-18

## 2023-08-01 DIAGNOSIS — I10 ESSENTIAL HYPERTENSION: ICD-10-CM

## 2023-08-01 RX ORDER — AMLODIPINE BESYLATE 10 MG/1
10 TABLET ORAL DAILY
Qty: 30 TABLET | Refills: 3 | OUTPATIENT
Start: 2023-08-01

## 2023-08-10 DIAGNOSIS — I10 ESSENTIAL HYPERTENSION: ICD-10-CM

## 2023-08-10 RX ORDER — AMLODIPINE BESYLATE 10 MG/1
10 TABLET ORAL DAILY
Qty: 30 TABLET | Refills: 3 | OUTPATIENT
Start: 2023-08-10

## 2023-08-16 DIAGNOSIS — I10 ESSENTIAL HYPERTENSION: ICD-10-CM

## 2023-08-16 RX ORDER — AMLODIPINE BESYLATE 10 MG/1
10 TABLET ORAL DAILY
Qty: 30 TABLET | Refills: 3 | Status: SHIPPED | OUTPATIENT
Start: 2023-08-16

## 2023-10-25 ENCOUNTER — VBI (OUTPATIENT)
Dept: ADMINISTRATIVE | Facility: OTHER | Age: 53
End: 2023-10-25

## 2023-11-09 ENCOUNTER — TELEPHONE (OUTPATIENT)
Dept: FAMILY MEDICINE CLINIC | Facility: CLINIC | Age: 53
End: 2023-11-09

## 2023-11-09 NOTE — TELEPHONE ENCOUNTER
T/c from pt's wife to schedule his PE - in order for him to get his insurance discount, he needs the appt on or before 12/15 - no available appts on your schedule until February. Requesting call back to his wife. Please advise.

## 2023-11-13 DIAGNOSIS — Z12.11 SCREEN FOR COLON CANCER: ICD-10-CM

## 2023-11-13 DIAGNOSIS — E78.2 MIXED HYPERLIPIDEMIA: ICD-10-CM

## 2023-11-13 DIAGNOSIS — Z12.5 PROSTATE CANCER SCREENING: Primary | ICD-10-CM

## 2023-11-15 ENCOUNTER — APPOINTMENT (OUTPATIENT)
Dept: LAB | Facility: HOSPITAL | Age: 53
End: 2023-11-15
Attending: FAMILY MEDICINE
Payer: COMMERCIAL

## 2023-11-15 DIAGNOSIS — E78.2 MIXED HYPERLIPIDEMIA: ICD-10-CM

## 2023-11-15 DIAGNOSIS — Z12.5 PROSTATE CANCER SCREENING: ICD-10-CM

## 2023-11-15 LAB
ALBUMIN SERPL BCP-MCNC: 4.9 G/DL (ref 3.5–5)
ALP SERPL-CCNC: 89 U/L (ref 34–104)
ALT SERPL W P-5'-P-CCNC: 38 U/L (ref 7–52)
ANION GAP SERPL CALCULATED.3IONS-SCNC: 8 MMOL/L
AST SERPL W P-5'-P-CCNC: 20 U/L (ref 13–39)
BILIRUB SERPL-MCNC: 0.92 MG/DL (ref 0.2–1)
BUN SERPL-MCNC: 12 MG/DL (ref 5–25)
CALCIUM SERPL-MCNC: 9.9 MG/DL (ref 8.4–10.2)
CHLORIDE SERPL-SCNC: 101 MMOL/L (ref 96–108)
CHOLEST SERPL-MCNC: 266 MG/DL
CO2 SERPL-SCNC: 27 MMOL/L (ref 21–32)
CREAT SERPL-MCNC: 1.05 MG/DL (ref 0.6–1.3)
ERYTHROCYTE [DISTWIDTH] IN BLOOD BY AUTOMATED COUNT: 12 % (ref 11.6–15.1)
GFR SERPL CREATININE-BSD FRML MDRD: 79 ML/MIN/1.73SQ M
GLUCOSE P FAST SERPL-MCNC: 300 MG/DL (ref 65–99)
HCT VFR BLD AUTO: 45.9 % (ref 36.5–49.3)
HDLC SERPL-MCNC: 59 MG/DL
HGB BLD-MCNC: 15.6 G/DL (ref 12–17)
LDLC SERPL CALC-MCNC: 149 MG/DL (ref 0–100)
MCH RBC QN AUTO: 27.9 PG (ref 26.8–34.3)
MCHC RBC AUTO-ENTMCNC: 34 G/DL (ref 31.4–37.4)
MCV RBC AUTO: 82 FL (ref 82–98)
NONHDLC SERPL-MCNC: 207 MG/DL
PLATELET # BLD AUTO: 245 THOUSANDS/UL (ref 149–390)
PMV BLD AUTO: 9.5 FL (ref 8.9–12.7)
POTASSIUM SERPL-SCNC: 4.3 MMOL/L (ref 3.5–5.3)
PROT SERPL-MCNC: 8.1 G/DL (ref 6.4–8.4)
PSA SERPL-MCNC: 0.3 NG/ML (ref 0–4)
RBC # BLD AUTO: 5.59 MILLION/UL (ref 3.88–5.62)
SODIUM SERPL-SCNC: 136 MMOL/L (ref 135–147)
TRIGL SERPL-MCNC: 290 MG/DL
WBC # BLD AUTO: 7.06 THOUSAND/UL (ref 4.31–10.16)

## 2023-11-15 PROCEDURE — 36415 COLL VENOUS BLD VENIPUNCTURE: CPT

## 2023-11-15 PROCEDURE — 80053 COMPREHEN METABOLIC PANEL: CPT

## 2023-11-15 PROCEDURE — 85027 COMPLETE CBC AUTOMATED: CPT

## 2023-11-15 PROCEDURE — 80061 LIPID PANEL: CPT

## 2023-11-15 PROCEDURE — G0103 PSA SCREENING: HCPCS

## 2023-11-28 NOTE — TELEPHONE ENCOUNTER
Requested medication(s) are due for refill today: Yes  Patient has already received a courtesy refill: No  Other reason request has been forwarded to provider:    Patient was last seen by you for a physical  Lipid panel has not been done within 360 days  Please advise  Labs/EKG/Imaging Studies/Medications

## 2023-12-08 ENCOUNTER — OFFICE VISIT (OUTPATIENT)
Dept: FAMILY MEDICINE CLINIC | Facility: CLINIC | Age: 53
End: 2023-12-08
Payer: COMMERCIAL

## 2023-12-08 VITALS
BODY MASS INDEX: 29.77 KG/M2 | HEIGHT: 69 IN | OXYGEN SATURATION: 97 % | HEART RATE: 100 BPM | DIASTOLIC BLOOD PRESSURE: 80 MMHG | WEIGHT: 201 LBS | SYSTOLIC BLOOD PRESSURE: 136 MMHG

## 2023-12-08 DIAGNOSIS — F41.9 ANXIETY: ICD-10-CM

## 2023-12-08 DIAGNOSIS — Z00.00 ANNUAL PHYSICAL EXAM: Primary | ICD-10-CM

## 2023-12-08 DIAGNOSIS — E11.65 TYPE 2 DIABETES MELLITUS WITH HYPERGLYCEMIA, WITHOUT LONG-TERM CURRENT USE OF INSULIN (HCC): ICD-10-CM

## 2023-12-08 DIAGNOSIS — E78.2 MIXED HYPERLIPIDEMIA: ICD-10-CM

## 2023-12-08 DIAGNOSIS — I10 ESSENTIAL HYPERTENSION: ICD-10-CM

## 2023-12-08 LAB — COLOGUARD RESULT REPORTABLE: NEGATIVE

## 2023-12-08 PROCEDURE — 99396 PREV VISIT EST AGE 40-64: CPT | Performed by: FAMILY MEDICINE

## 2023-12-08 PROCEDURE — 99214 OFFICE O/P EST MOD 30 MIN: CPT | Performed by: FAMILY MEDICINE

## 2023-12-08 RX ORDER — BLOOD-GLUCOSE METER
KIT MISCELLANEOUS
Qty: 1 KIT | Refills: 0 | Status: SHIPPED | OUTPATIENT
Start: 2023-12-08

## 2023-12-08 RX ORDER — LANCETS 33 GAUGE
EACH MISCELLANEOUS
Qty: 100 EACH | Refills: 3 | Status: SHIPPED | OUTPATIENT
Start: 2023-12-08

## 2023-12-08 RX ORDER — ROSUVASTATIN CALCIUM 10 MG/1
10 TABLET, COATED ORAL DAILY
Qty: 30 TABLET | Refills: 5 | Status: SHIPPED | OUTPATIENT
Start: 2023-12-08

## 2023-12-08 RX ORDER — BLOOD SUGAR DIAGNOSTIC
STRIP MISCELLANEOUS
Qty: 100 EACH | Refills: 3 | Status: SHIPPED | OUTPATIENT
Start: 2023-12-08

## 2023-12-08 NOTE — ASSESSMENT & PLAN NOTE
Start metformin 500 mg twice daily, check his blood sugars daily in the morning, he was given information about the American diabetic Association website for diet and activity suggestions.   Recheck CMP, hemoglobin A1c in 3 months  No results found for: "HGBA1C"

## 2023-12-08 NOTE — PROGRESS NOTES
[address]    NAME: Antony Noe  AGE: 52 y.o. SEX: male  : 1970     DATE: 2023     Assessment and Plan:     Problem List Items Addressed This Visit     Hyperlipemia     Discontinue atorvastatin, start rosuvastatin 10 mg daily will titrate as necessary for better control. Relevant Medications    rosuvastatin (CRESTOR) 10 MG tablet    Other Relevant Orders    Lipid panel    Anxiety     Stable, continue venlafaxine. Essential hypertension     Controlled, continue the amlodipine         Type 2 diabetes mellitus with hyperglycemia, without long-term current use of insulin (HCC)     Start metformin 500 mg twice daily, check his blood sugars daily in the morning, he was given information about the American diabetic Association website for diet and activity suggestions. Recheck CMP, hemoglobin A1c in 3 months  No results found for: "HGBA1C"         Relevant Medications    Blood Glucose Monitoring Suppl (OneTouch Verio Reflect) w/Device KIT    glucose blood (OneTouch Verio) test strip    OneTouch Delica Lancets 22X MISC    metFORMIN (GLUCOPHAGE) 500 mg tablet    Other Relevant Orders    Comprehensive metabolic panel    Hemoglobin A1C   Other Visit Diagnoses     Annual physical exam    -  Primary    BMI 29.0-29.9,adult              Immunizations and preventive care screenings were discussed with patient today. Appropriate education was printed on patient's after visit summary. Discussed risks and benefits of prostate cancer screening. We discussed the controversial history of PSA screening for prostate cancer in the [facility] as well as the risk of over detection and over treatment of prostate cancer by way of PSA screening.   The patient understands that PSA blood testing is an imperfect way to screen for prostate cancer and that elevated PSA levels in the blood may also be caused by infection, inflammation, prostatic trauma or manipulation, urological procedures, or by benign prostatic enlargement. The role of the digital rectal examination in prostate cancer screening was also discussed and I discussed with him that there is large interobserver variability in the findings of digital rectal examination. Counseling:  Dental Health: discussed importance of regular tooth brushing, flossing, and dental visits. Exercise: the importance of regular exercise/physical activity was discussed. Recommend exercise 3-5 times per week for at least 30 minutes. Depression Screening and Follow-up Plan: Patient was screened for depression during today's encounter. They screened negative with a PHQ-2 score of 0. Return in 3 months (on 3/8/2024). Chief Complaint:     Chief Complaint   Patient presents with   • Physical Exam      History of Present Illness:     Adult Annual Physical   Patient here for a comprehensive physical exam. The patient reports increased thirst and urination. He did get his blood work done which showed a high blood sugar. Diet and Physical Activity  Diet/Nutrition: well balanced diet. Exercise: walking. Depression Screening  PHQ-2/9 Depression Screening    Little interest or pleasure in doing things: 0 - not at all  Feeling down, depressed, or hopeless: 0 - not at all  PHQ-2 Score: 0  PHQ-2 Interpretation: Negative depression screen       General Health  Sleep: sleeps well. Hearing: normal - bilateral.  Vision: no vision problems. Dental: regular dental visits.  Health  Symptoms include: none         Review of Systems:     Review of Systems   Constitutional:  Negative for chills, fatigue and fever. HENT:  Negative for congestion, ear pain, hearing loss, postnasal drip, rhinorrhea and sore throat. Eyes:  Negative for pain and visual disturbance. Respiratory:  Negative for chest tightness, shortness of breath and wheezing. Cardiovascular:  Negative for chest pain and leg swelling. Gastrointestinal:  Negative for abdominal distention, abdominal pain, constipation, diarrhea and vomiting. Endocrine: Positive for polydipsia and polyuria. Negative for cold intolerance and heat intolerance. Genitourinary:  Negative for difficulty urinating, frequency and urgency. Musculoskeletal:  Negative for arthralgias and gait problem. Skin:  Negative for color change. Neurological:  Negative for dizziness, tremors, syncope, numbness and headaches. Hematological:  Negative for adenopathy. Psychiatric/Behavioral:  Negative for agitation, confusion and sleep disturbance. The patient is not nervous/anxious.        Past Medical History:     Past Medical History:   Diagnosis Date   • Anxiety    • CAD in native artery 3/9/2015   • CPAP (continuous positive airway pressure) dependence    • Depression    • Hyperlipidemia    • Hypertension    • Incarcerated umbilical hernia 9/82/0176   • Sleep apnea       Past Surgical History:     Past Surgical History:   Procedure Laterality Date   • APPENDECTOMY     • CARDIAC CATHETERIZATION     • KNEE CARTILAGE SURGERY     • KNEE SURGERY Left    • LA LAPS REPAIR HERNIA EXCEPT INCAL/INGUN REDUCIBLE N/A 01/10/2018    Procedure: LAPAROSCOPIC UMBILICAL HERNIA REPAIR WITH MESH;  Surgeon: Rae Garcia MD;  Location: [facility];  Service: General   • LA SURGICAL ARTHROSCOPY SHOULDER W/ROTATOR CUFF RPR Left 06/04/2020    Procedure: Left shoulder arthroscopic rotator cuff repair, possible biceps tenodesis vs biceps tenodesis open;  Surgeon: Tyrone Prado MD;  Location: [facility];  Service: Orthopedics      Family History:     Family History   Problem Relation Age of Onset   • Hypertension Mother    • Coronary artery disease Mother    • Hypertension Father    • Heart disease Father    • Coronary artery disease Father    • Coronary artery disease Brother    • Hypertension Brother       Social History:     Social History     Socioeconomic History   • Marital status: /Civil Union     Spouse name: None   • Number of children: None   • Years of education: None   • Highest education level: None   Occupational History   • None   Tobacco Use   • Smoking status: Some Days     Types: Cigars   • Smokeless tobacco: Never   • Tobacco comments:     rare   Vaping Use   • Vaping Use: Never used   Substance and Sexual Activity   • Alcohol use: Yes     Comment: rarely   • Drug use: No   • Sexual activity: Yes   Other Topics Concern   • None   Social History Narrative   • None     Social Determinants of Health     Financial Resource Strain: Not on file   Food Insecurity: Not on file   Transportation Needs: Not on file   Physical Activity: Not on file   Stress: Not on file   Social Connections: Not on file   Intimate Partner Violence: Not on file   Housing Stability: Not on file      Current Medications:     Current Outpatient Medications   Medication Sig Dispense Refill   • amLODIPine (NORVASC) 10 mg tablet Take 1 tablet (10 mg total) by mouth daily 30 tablet 3   • aspirin 81 MG tablet Take 81 tablets by mouth daily     • Blood Glucose Monitoring Suppl (OneTouch Verio Reflect) w/Device KIT Check blood sugars once daily. Please substitute with appropriate alternative as covered by patient's insurance. Dx: E11.65 1 kit 0   • cyclobenzaprine (FLEXERIL) 10 mg tablet Take 1 tablet (10 mg total) by mouth 3 (three) times a day as needed for muscle spasms 30 tablet 0   • glucose blood (OneTouch Verio) test strip Check blood sugars once daily. Please substitute with appropriate alternative as covered by patient's insurance.  Dx: E11.65 100 each 3   • HYDROcodone-acetaminophen (NORCO) 5-325 mg per tablet Take 1 tablet by mouth     • meloxicam (Mobic) 15 mg tablet Take 1 tablet (15 mg total) by mouth daily 30 tablet 1   • metFORMIN (GLUCOPHAGE) 500 mg tablet Take 1 tablet (500 mg total) by mouth 2 (two) times a day with meals 60 tablet 5   • OneTouch Delica Lancets 04G MISC Check blood sugars once daily. Please substitute with appropriate alternative as covered by patient's insurance. Dx: E11.65 100 each 3   • rosuvastatin (CRESTOR) 10 MG tablet Take 1 tablet (10 mg total) by mouth daily 30 tablet 5   • tobramycin (Tobrex) 0.3 % SOLN Administer 1 drop to both eyes every 4 (four) hours while awake 5 mL 0   • venlafaxine (EFFEXOR-XR) 150 mg 24 hr capsule take 1 capsule by mouth once daily 30 capsule 5     No current facility-administered medications for this visit. Allergies: Allergies   Allergen Reactions   • Betadine [Povidone Iodine] Rash      Physical Exam:     /80   Pulse 100   Ht 5' 9" (1.753 m)   Wt 91.2 kg (201 lb)   SpO2 97%   BMI 29.68 kg/m²     Physical Exam  Constitutional:       Appearance: He is well-developed. HENT:      Head: Normocephalic. Right Ear: External ear normal.      Left Ear: External ear normal.      Nose: Nose normal.   Eyes:      Extraocular Movements: Extraocular movements intact. Conjunctiva/sclera: Conjunctivae normal.      Pupils: Pupils are equal, round, and reactive to light. Neck:      Thyroid: No thyromegaly. Cardiovascular:      Rate and Rhythm: Normal rate and regular rhythm. Heart sounds: Normal heart sounds. Pulmonary:      Effort: Pulmonary effort is normal.      Breath sounds: Normal breath sounds. Abdominal:      General: Bowel sounds are normal.      Palpations: Abdomen is soft. Musculoskeletal:         General: Normal range of motion. Cervical back: Normal range of motion. Skin:     General: Skin is warm and dry. Neurological:      Mental Status: He is alert and oriented to person, place, and time.    Psychiatric:         Mood and Affect: Mood normal.         Behavior: Behavior normal.          Antonio Marie, DO  [address] [place] [address]

## 2023-12-08 NOTE — PATIENT INSTRUCTIONS
Wellness Visit for Adults   AMBULATORY CARE:   A wellness visit  is when you see your healthcare provider to get screened for health problems. Your healthcare provider will also give you advice on how to stay healthy. Write down your questions so you remember to ask them. Ask your healthcare provider how often you should have a wellness visit. What happens at a wellness visit:  Your healthcare provider will ask about your health, and your family history of health problems. This includes high blood pressure, heart disease, and cancer. He or she will ask if you have symptoms that concern you, if you smoke, and about your mood. You may also be asked about your intake of medicines, supplements, food, and alcohol. Any of the following may be done: Your weight  will be checked. Your height may also be checked so your body mass index (BMI) can be calculated. Your BMI shows if you are at a healthy weight. Your blood pressure  and heart rate will be checked. Your temperature may also be checked. Blood and urine tests  may be done. Blood tests may be done to check your cholesterol levels. Abnormal cholesterol levels increase your risk for heart disease and stroke. You may also need a blood or urine test to check for diabetes if you are at increased risk. Urine tests may be done to look for signs of an infection or kidney disease. A physical exam  includes checking your heartbeat and lungs with a stethoscope. Your healthcare provider may also check your skin to look for sun damage. Screening tests  may be recommended. A screening test is done to check for diseases that may not cause symptoms. The screening tests you may need depend on your age, gender, family history, and lifestyle habits. For example, colorectal screening may be recommended if you are 48years old or older. Screening tests you need if you are a woman:   A Pap smear  is used to screen for cervical cancer.  Pap smears are usually done every 3 to 5 years depending on your age. You may need them more often if you have had abnormal Pap smear test results in the past. Ask your healthcare provider how often you should have a Pap smear. A mammogram  is an x-ray of your breasts to screen for breast cancer. Experts recommend mammograms every 2 years starting at age 48 years. You may need a mammogram at age 52 years or younger if you have an increased risk for breast cancer. Talk to your healthcare provider about when you should start having mammograms and how often you need them. Vaccines you may need:   Get an influenza vaccine  every year. The influenza vaccine protects you from the flu. Several types of viruses cause the flu. The viruses change over time, so new vaccines are made each year. Get a tetanus-diphtheria (Td) booster vaccine  every 10 years. This vaccine protects you against tetanus and diphtheria. Tetanus is a severe infection that may cause painful muscle spasms and lockjaw. Diphtheria is a severe bacterial infection that causes a thick covering in the back of your mouth and throat. Get a human papillomavirus (HPV) vaccine  if you are female and aged 23 to 32 or male 23 to 24 and never received it. This vaccine protects you from HPV infection. HPV is the most common infection spread by sexual contact. HPV may also cause vaginal, penile, and anal cancers. Get a pneumococcal vaccine  if you are aged 72 years or older. The pneumococcal vaccine is an injection given to protect you from pneumococcal disease. Pneumococcal disease is an infection caused by pneumococcal bacteria. The infection may cause pneumonia, meningitis, or an ear infection. Get a shingles vaccine  if you are 60 or older, even if you have had shingles before. The shingles vaccine is an injection to protect you from the varicella-zoster virus. This is the same virus that causes chickenpox.  Shingles is a painful rash that develops in people who had chickenpox or have been exposed to the virus. How to eat healthy:  My Plate is a model for planning healthy meals. It shows the types and amounts of foods that should go on your plate. Fruits and vegetables make up about half of your plate, and grains and protein make up the other half. A serving of dairy is included on the side of your plate. The amount of calories and serving sizes you need depends on your age, gender, weight, and height. Examples of healthy foods are listed below:  Eat a variety of vegetables  such as dark green, red, and orange vegetables. You can also include canned vegetables low in sodium (salt) and frozen vegetables without added butter or sauces. Eat a variety of fresh fruits , canned fruit in 100% juice, frozen fruit, and dried fruit. Include whole grains. At least half of the grains you eat should be whole grains. Examples include whole-wheat bread, wheat pasta, brown rice, and whole-grain cereals such as oatmeal.    Eat a variety of protein foods such as seafood (fish and shellfish), lean meat, and poultry without skin (turkey and chicken). Examples of lean meats include pork leg, shoulder, or tenderloin, and beef round, sirloin, tenderloin, and extra lean ground beef. Other protein foods include eggs and egg substitutes, beans, peas, soy products, nuts, and seeds. Choose low-fat dairy products such as skim or 1% milk or low-fat yogurt, cheese, and cottage cheese. Limit unhealthy fats  such as butter, hard margarine, and shortening. Exercise:  Exercise at least 30 minutes per day on most days of the week. Some examples of exercise include walking, biking, dancing, and swimming. You can also fit in more physical activity by taking the stairs instead of the elevator or parking farther away from stores. Include muscle strengthening activities 2 days each week. Regular exercise provides many health benefits.  It helps you manage your weight, and decreases your risk for type 2 diabetes, heart disease, stroke, and high blood pressure. Exercise can also help improve your mood. Ask your healthcare provider about the best exercise plan for you. General health and safety guidelines:   Do not smoke. Nicotine and other chemicals in cigarettes and cigars can cause lung damage. Ask your healthcare provider for information if you currently smoke and need help to quit. E-cigarettes or smokeless tobacco still contain nicotine. Talk to your healthcare provider before you use these products. Limit alcohol. A drink of alcohol is 12 ounces of beer, 5 ounces of wine, or 1½ ounces of liquor. Lose weight, if needed. Being overweight increases your risk of certain health conditions. These include heart disease, high blood pressure, type 2 diabetes, and certain types of cancer. Protect your skin. Do not sunbathe or use tanning beds. Use sunscreen with a SPF 15 or higher. Apply sunscreen at least 15 minutes before you go outside. Reapply sunscreen every 2 hours. Wear protective clothing, hats, and sunglasses when you are outside. Drive safely. Always wear your seatbelt. Make sure everyone in your car wears a seatbelt. A seatbelt can save your life if you are in an accident. Do not use your cell phone when you are driving. This could distract you and cause an accident. Pull over if you need to make a call or send a text message. Practice safe sex. Use latex condoms if are sexually active and have more than one partner. Your healthcare provider may recommend screening tests for sexually transmitted infections (STIs). Wear helmets, lifejackets, and protective gear. Always wear a helmet when you ride a bike or motorcycle, go skiing, or play sports that could cause a head injury. Wear protective equipment when you play sports. Wear a lifejacket when you are on a boat or doing water sports.     © Copyright [employer] 2023 Information is for End User's use only and may not be sold, redistributed or otherwise used for commercial purposes. The above information is an  only. It is not intended as medical advice for individual conditions or treatments. Talk to your doctor, nurse or pharmacist before following any medical regimen to see if it is safe and effective for you.

## 2023-12-08 NOTE — ASSESSMENT & PLAN NOTE
Discontinue atorvastatin, start rosuvastatin 10 mg daily will titrate as necessary for better control.

## 2023-12-09 DIAGNOSIS — I10 ESSENTIAL HYPERTENSION: ICD-10-CM

## 2023-12-09 RX ORDER — AMLODIPINE BESYLATE 10 MG/1
10 TABLET ORAL DAILY
Qty: 30 TABLET | Refills: 3 | Status: SHIPPED | OUTPATIENT
Start: 2023-12-09

## 2023-12-29 ENCOUNTER — TELEPHONE (OUTPATIENT)
Dept: FAMILY MEDICINE CLINIC | Facility: CLINIC | Age: 53
End: 2023-12-29

## 2023-12-29 NOTE — TELEPHONE ENCOUNTER
Returned message - pt wishes to discuss this matter with .     , please RCB after 3:00 PM.     Please advise       Another VM from pt :   Jalen, this is Ramirez. Last name India, call me back [phone]. Can you do that? After 3:00, I'll be able to  calling concerns about life insurance. I need a doctor Merari to write a note for insurance company for life insurance. OK. Thank you so much. Bye, bye.

## 2023-12-29 NOTE — TELEPHONE ENCOUNTER
"VM from pt:    \"Once again, can you give me a call back to Ramirez Doe calling [phone]? Thank you.\"          Called pt and LVM.  "

## 2024-01-01 DIAGNOSIS — F41.9 ANXIETY: ICD-10-CM

## 2024-01-01 RX ORDER — VENLAFAXINE HYDROCHLORIDE 150 MG/1
CAPSULE, EXTENDED RELEASE ORAL
Qty: 30 CAPSULE | Refills: 5 | Status: SHIPPED | OUTPATIENT
Start: 2024-01-01

## 2024-01-03 ENCOUNTER — PATIENT MESSAGE (OUTPATIENT)
Dept: FAMILY MEDICINE CLINIC | Facility: CLINIC | Age: 54
End: 2024-01-03

## 2024-01-03 NOTE — TELEPHONE ENCOUNTER
Pt came to office -- made copies of pprwk for Dr Gage to write letter (placed in Dr's folder).    Pt reports he had a procedure done in the past, that he specifically requested be ordered to be proactive because his brother had a health issue (pt mentioned cath).  Insurance now needs a letter stating that what he had done was just a proactive measure and he does not have any associated condition.    When letter is done, please email to [email or web address] and notify pt @ [phone].

## 2024-01-03 NOTE — TELEPHONE ENCOUNTER
Pt returned vm - pt vey apologetic for the delay in his response - pt gerson stop by the office shortly with form/ppwrk for  (what he's requesting/ what he needs)    Also pt notified his venlafaxine has been sent in.

## 2024-01-23 ENCOUNTER — TELEPHONE (OUTPATIENT)
Dept: FAMILY MEDICINE CLINIC | Facility: CLINIC | Age: 54
End: 2024-01-23

## 2024-01-23 NOTE — TELEPHONE ENCOUNTER
Regarding: FW: Cardiac Cath    ----- Message -----  From: Андрей Gage DO  Sent: 1/23/2024   7:11 AM EST  To: #  Subject: FW: Cardiac Cath                                 Okay, I do not nee it in care everywhere or our record, he will have to sign a records release so we can get it from the hospital, or he can contact medical records at [facility] to get a copy of the report.  ----- Message -----  From: Nydia Verdin  Sent: 1/22/2024   4:14 PM EST  To: Андрей Gage DO  Subject: Cardiac Cath                                     ----- Message from Nydia Verdin sent at 1/22/2024  4:14 PM EST -----       ----- Message sent from Андрей Gage DO to Augustin Osborne at 1/3/2024  4:58 PM -----   Ramirez, who did your cath and where was it done.  I cannot find it in your records, just that it was done.

## 2024-02-21 PROBLEM — Z13.1 SCREENING FOR DIABETES MELLITUS: Status: RESOLVED | Noted: 2019-09-11 | Resolved: 2024-02-21

## 2024-03-06 ENCOUNTER — OFFICE VISIT (OUTPATIENT)
Dept: FAMILY MEDICINE CLINIC | Facility: CLINIC | Age: 54
End: 2024-03-06
Payer: COMMERCIAL

## 2024-03-06 VITALS
DIASTOLIC BLOOD PRESSURE: 90 MMHG | BODY MASS INDEX: 31.55 KG/M2 | OXYGEN SATURATION: 97 % | TEMPERATURE: 97.3 F | SYSTOLIC BLOOD PRESSURE: 146 MMHG | HEIGHT: 69 IN | WEIGHT: 213 LBS | HEART RATE: 98 BPM

## 2024-03-06 DIAGNOSIS — E11.65 TYPE 2 DIABETES MELLITUS WITH HYPERGLYCEMIA, WITHOUT LONG-TERM CURRENT USE OF INSULIN (HCC): Primary | ICD-10-CM

## 2024-03-06 DIAGNOSIS — M25.562 PAIN AND SWELLING OF LEFT KNEE: ICD-10-CM

## 2024-03-06 DIAGNOSIS — M25.462 PAIN AND SWELLING OF LEFT KNEE: ICD-10-CM

## 2024-03-06 DIAGNOSIS — I10 ESSENTIAL HYPERTENSION: ICD-10-CM

## 2024-03-06 DIAGNOSIS — E78.2 MIXED HYPERLIPIDEMIA: ICD-10-CM

## 2024-03-06 LAB — SL AMB POCT HEMOGLOBIN AIC: 10.1 (ref ?–6.5)

## 2024-03-06 PROCEDURE — 99214 OFFICE O/P EST MOD 30 MIN: CPT | Performed by: FAMILY MEDICINE

## 2024-03-06 PROCEDURE — 83036 HEMOGLOBIN GLYCOSYLATED A1C: CPT | Performed by: FAMILY MEDICINE

## 2024-03-06 RX ORDER — AMLODIPINE BESYLATE 10 MG/1
10 TABLET ORAL DAILY
Qty: 30 TABLET | Refills: 5 | Status: SHIPPED | OUTPATIENT
Start: 2024-03-06

## 2024-03-06 RX ORDER — MELOXICAM 15 MG/1
15 TABLET ORAL DAILY
Qty: 30 TABLET | Refills: 1 | Status: SHIPPED | OUTPATIENT
Start: 2024-03-06

## 2024-03-06 NOTE — ASSESSMENT & PLAN NOTE
Restart meloxicam 15 mg daily, if this does not help I did discuss with with him seeing orthopedics.

## 2024-03-06 NOTE — ASSESSMENT & PLAN NOTE
Increase the metformin to 1000 mg twice daily, continue to check his sugars once daily, recheck CMP, hemoglobin A1c in 3 to 4 months.  Lab Results   Component Value Date    HGBA1C 10.1 (A) 03/06/2024

## 2024-03-06 NOTE — PROGRESS NOTES
Name: Augustin Osborne      : 1970      MRN: [id number]  Encounter Provider: Андрей Gage DO  Encounter Date: 3/6/2024   Encounter department: [facility] [address]    Assessment & Plan     1. Type 2 diabetes mellitus with hyperglycemia, without long-term current use of insulin (HCC)  Assessment & Plan:  Increase the metformin to 1000 mg twice daily, continue to check his sugars once daily, recheck CMP, hemoglobin A1c in 3 to 4 months.  Lab Results   Component Value Date    HGBA1C 10.1 (A) 2024     Orders:  -     POCT hemoglobin A1c  -     metFORMIN (GLUCOPHAGE) 1000 MG tablet; Take 1 tablet (1,000 mg total) by mouth 2 (two) times a day with meals  -     Albumin / creatinine urine ratio; Future    2. Essential hypertension  Assessment & Plan:  Restart amlodipine 10 mg daily, recheck his blood pressure in 4 months    Orders:  -     amLODIPine (NORVASC) 10 mg tablet; Take 1 tablet (10 mg total) by mouth daily    3. Mixed hyperlipidemia  Assessment & Plan:  Continue rosuvastatin, check CMP, lipid profile with his routine diabetic blood work.      4. Pain and swelling of left knee  Assessment & Plan:  Restart meloxicam 15 mg daily, if this does not help I did discuss with with him seeing orthopedics.    Orders:  -     meloxicam (Mobic) 15 mg tablet; Take 1 tablet (15 mg total) by mouth daily           Subjective      Patient comes in today for follow-up, states that he has been checking his sugars at home and a been running around the low 200s.  He states that he would like to get cataract surgery done however his ophthalmologist wants to see his sugars controlled better.  He is not having any problems taking the metformin.  He states he has not been taking his amlodipine for the last several days.      Review of Systems   Constitutional:  Negative for chills, fatigue and fever.   HENT:  Negative for congestion, ear pain, hearing loss, postnasal drip, rhinorrhea and  sore throat.    Eyes:  Negative for pain and visual disturbance.   Respiratory:  Negative for chest tightness, shortness of breath and wheezing.    Cardiovascular:  Negative for chest pain and leg swelling.   Gastrointestinal:  Negative for abdominal distention, abdominal pain, constipation, diarrhea and vomiting.   Endocrine: Negative for cold intolerance and heat intolerance.   Genitourinary:  Negative for difficulty urinating, frequency and urgency.   Musculoskeletal:  Positive for arthralgias (Left knee). Negative for gait problem.   Skin:  Negative for color change.   Neurological:  Negative for dizziness, tremors, syncope, numbness and headaches.   Hematological:  Negative for adenopathy.   Psychiatric/Behavioral:  Negative for agitation, confusion and sleep disturbance. The patient is not nervous/anxious.        Current Outpatient Medications on File Prior to Visit   Medication Sig   • aspirin 81 MG tablet Take 81 tablets by mouth daily   • Blood Glucose Monitoring Suppl (OneTouch Verio Reflect) w/Device KIT Check blood sugars once daily. Please substitute with appropriate alternative as covered by patient's insurance. Dx: E11.65   • glucose blood (OneTouch Verio) test strip Check blood sugars once daily. Please substitute with appropriate alternative as covered by patient's insurance. Dx: E11.65   • OneTouch Delica Lancets 33G MISC Check blood sugars once daily. Please substitute with appropriate alternative as covered by patient's insurance. Dx: E11.65   • rosuvastatin (CRESTOR) 10 MG tablet Take 1 tablet (10 mg total) by mouth daily   • tobramycin (Tobrex) 0.3 % SOLN Administer 1 drop to both eyes every 4 (four) hours while awake   • venlafaxine (EFFEXOR-XR) 150 mg 24 hr capsule take 1 capsule by mouth once daily   • [DISCONTINUED] amLODIPine (NORVASC) 10 mg tablet take 1 tablet by mouth once daily   • [DISCONTINUED] metFORMIN (GLUCOPHAGE) 500 mg tablet Take 1 tablet (500 mg total) by mouth 2 (two) times a  "day with meals   • [DISCONTINUED] cyclobenzaprine (FLEXERIL) 10 mg tablet Take 1 tablet (10 mg total) by mouth 3 (three) times a day as needed for muscle spasms (Patient not taking: Reported on 3/6/2024)   • [DISCONTINUED] HYDROcodone-acetaminophen (NORCO) 5-325 mg per tablet Take 1 tablet by mouth (Patient not taking: Reported on 3/6/2024)   • [DISCONTINUED] meloxicam (Mobic) 15 mg tablet Take 1 tablet (15 mg total) by mouth daily (Patient not taking: Reported on 3/6/2024)       Objective     /90 (BP Location: Left arm, Patient Position: Sitting, Cuff Size: Standard)   Pulse 98   Temp (!) 97.3 °F (36.3 °C) (Tympanic)   Ht 5' 9\" (1.753 m)   Wt 96.6 kg (213 lb)   SpO2 97%   BMI 31.45 kg/m²     Physical Exam  Vitals and nursing note reviewed.   Constitutional:       General: He is not in acute distress.     Appearance: Normal appearance. He is well-developed.   HENT:      Head: Normocephalic.   Eyes:      General: No scleral icterus.     Conjunctiva/sclera: Conjunctivae normal.   Neck:      Thyroid: No thyromegaly.   Cardiovascular:      Rate and Rhythm: Normal rate and regular rhythm.      Heart sounds: Normal heart sounds. No murmur heard.  Pulmonary:      Effort: Pulmonary effort is normal. No respiratory distress.      Breath sounds: Normal breath sounds. No wheezing.   Abdominal:      General: Bowel sounds are normal. There is no distension.      Palpations: Abdomen is soft.      Tenderness: There is no abdominal tenderness.   Musculoskeletal:         General: No tenderness. Normal range of motion.      Cervical back: Normal range of motion.      Right lower leg: No edema.      Left lower leg: No edema.      Comments: Crepitus over his left knee with flexion and extension   Lymphadenopathy:      Cervical: No cervical adenopathy.   Skin:     General: Skin is warm and dry.      Coloration: Skin is not pale.      Findings: No rash.   Neurological:      Mental Status: He is alert and oriented to person, " place, and time.      Cranial Nerves: No cranial nerve deficit.   Psychiatric:         Behavior: Behavior normal.       Андрей Gage DO

## 2024-04-30 ENCOUNTER — TELEPHONE (OUTPATIENT)
Age: 54
End: 2024-04-30

## 2024-04-30 DIAGNOSIS — F41.9 ANXIETY: ICD-10-CM

## 2024-04-30 RX ORDER — VENLAFAXINE HYDROCHLORIDE 150 MG/1
150 CAPSULE, EXTENDED RELEASE ORAL DAILY
Qty: 30 CAPSULE | Refills: 5 | Status: CANCELLED | OUTPATIENT
Start: 2024-04-30

## 2024-04-30 NOTE — TELEPHONE ENCOUNTER
Patient returned call.  Patient wants provider he is interested in a PRN medication to supplement his venlafixine for on days when he just needs a little extra.    He trust the provider's recommendation on this and would like a PRN medication sent to the [facility] to be filled.    Patient also would like provider to know that he did double up on his venlafaxin 150 mg over the past two days.  He only has two pills left and would like this refilled with [facility] as well.

## 2024-05-01 ENCOUNTER — TELEPHONE (OUTPATIENT)
Age: 54
End: 2024-05-01

## 2024-05-01 DIAGNOSIS — F41.9 ANXIETY: Primary | ICD-10-CM

## 2024-05-01 RX ORDER — HYDROXYZINE PAMOATE 25 MG/1
25 CAPSULE ORAL 3 TIMES DAILY PRN
Qty: 30 CAPSULE | Refills: 0 | Status: SHIPPED | OUTPATIENT
Start: 2024-05-01

## 2024-05-01 NOTE — TELEPHONE ENCOUNTER
Patient called and inquired about why he received amoxicillin from the [facility].  I let him know amoxicillin was not listed as a prescribed medication in his chart.  He believes this was given to him in error.  I also informed him Dr. Gage had prescribed him hydroxyzine this morning that was sent to [facility].  He had no further questions/concerns.

## 2024-05-29 ENCOUNTER — TELEPHONE (OUTPATIENT)
Age: 54
End: 2024-05-29

## 2024-05-29 ENCOUNTER — OFFICE VISIT (OUTPATIENT)
Dept: LAB | Facility: HOSPITAL | Age: 54
End: 2024-05-29
Payer: COMMERCIAL

## 2024-05-29 ENCOUNTER — APPOINTMENT (OUTPATIENT)
Dept: LAB | Facility: HOSPITAL | Age: 54
End: 2024-05-29
Payer: COMMERCIAL

## 2024-05-29 DIAGNOSIS — Z01.89 RADIOLOGICAL EXAMINATION, NOT ELSEWHERE CLASSIFIED: ICD-10-CM

## 2024-05-29 DIAGNOSIS — E13.69 OTHER SPECIFIED DIABETES MELLITUS WITH OTHER SPECIFIED COMPLICATION, UNSPECIFIED WHETHER LONG TERM INSULIN USE (HCC): ICD-10-CM

## 2024-05-29 LAB
ANION GAP SERPL CALCULATED.3IONS-SCNC: 8 MMOL/L (ref 4–13)
APTT PPP: 24 SECONDS (ref 23–37)
ATRIAL RATE: 74 BPM
BASOPHILS # BLD AUTO: 0.04 THOUSANDS/ÂΜL (ref 0–0.1)
BASOPHILS NFR BLD AUTO: 1 % (ref 0–1)
BUN SERPL-MCNC: 15 MG/DL (ref 5–25)
CALCIUM SERPL-MCNC: 9.3 MG/DL (ref 8.4–10.2)
CHLORIDE SERPL-SCNC: 103 MMOL/L (ref 96–108)
CO2 SERPL-SCNC: 26 MMOL/L (ref 21–32)
CREAT SERPL-MCNC: 0.93 MG/DL (ref 0.6–1.3)
EOSINOPHIL # BLD AUTO: 0.21 THOUSAND/ÂΜL (ref 0–0.61)
EOSINOPHIL NFR BLD AUTO: 3 % (ref 0–6)
ERYTHROCYTE [DISTWIDTH] IN BLOOD BY AUTOMATED COUNT: 12.8 % (ref 11.6–15.1)
EST. AVERAGE GLUCOSE BLD GHB EST-MCNC: 209 MG/DL
GFR SERPL CREATININE-BSD FRML MDRD: 92 ML/MIN/1.73SQ M
GLUCOSE P FAST SERPL-MCNC: 140 MG/DL (ref 65–99)
HBA1C MFR BLD: 8.9 %
HCT VFR BLD AUTO: 42.4 % (ref 36.5–49.3)
HGB BLD-MCNC: 13.8 G/DL (ref 12–17)
IMM GRANULOCYTES # BLD AUTO: 0.01 THOUSAND/UL (ref 0–0.2)
IMM GRANULOCYTES NFR BLD AUTO: 0 % (ref 0–2)
INR PPP: 0.95 (ref 0.84–1.19)
LYMPHOCYTES # BLD AUTO: 2.03 THOUSANDS/ÂΜL (ref 0.6–4.47)
LYMPHOCYTES NFR BLD AUTO: 27 % (ref 14–44)
MCH RBC QN AUTO: 27.4 PG (ref 26.8–34.3)
MCHC RBC AUTO-ENTMCNC: 32.5 G/DL (ref 31.4–37.4)
MCV RBC AUTO: 84 FL (ref 82–98)
MONOCYTES # BLD AUTO: 0.59 THOUSAND/ÂΜL (ref 0.17–1.22)
MONOCYTES NFR BLD AUTO: 8 % (ref 4–12)
NEUTROPHILS # BLD AUTO: 4.62 THOUSANDS/ÂΜL (ref 1.85–7.62)
NEUTS SEG NFR BLD AUTO: 61 % (ref 43–75)
NRBC BLD AUTO-RTO: 0 /100 WBCS
P AXIS: 30 DEGREES
PLATELET # BLD AUTO: 264 THOUSANDS/UL (ref 149–390)
PMV BLD AUTO: 9.6 FL (ref 8.9–12.7)
POTASSIUM SERPL-SCNC: 4.2 MMOL/L (ref 3.5–5.3)
PR INTERVAL: 156 MS
PROTHROMBIN TIME: 13.2 SECONDS (ref 11.6–14.5)
QRS AXIS: 84 DEGREES
QRSD INTERVAL: 88 MS
QT INTERVAL: 388 MS
QTC INTERVAL: 430 MS
RBC # BLD AUTO: 5.04 MILLION/UL (ref 3.88–5.62)
SODIUM SERPL-SCNC: 137 MMOL/L (ref 135–147)
T WAVE AXIS: 26 DEGREES
VENTRICULAR RATE: 74 BPM
WBC # BLD AUTO: 7.5 THOUSAND/UL (ref 4.31–10.16)

## 2024-05-29 PROCEDURE — 93010 ELECTROCARDIOGRAM REPORT: CPT | Performed by: INTERNAL MEDICINE

## 2024-05-29 PROCEDURE — 83036 HEMOGLOBIN GLYCOSYLATED A1C: CPT

## 2024-05-29 PROCEDURE — 85610 PROTHROMBIN TIME: CPT

## 2024-05-29 PROCEDURE — 80048 BASIC METABOLIC PNL TOTAL CA: CPT

## 2024-05-29 PROCEDURE — 93005 ELECTROCARDIOGRAM TRACING: CPT

## 2024-05-29 PROCEDURE — 85025 COMPLETE CBC W/AUTO DIFF WBC: CPT

## 2024-05-29 PROCEDURE — 36415 COLL VENOUS BLD VENIPUNCTURE: CPT

## 2024-05-29 PROCEDURE — 85730 THROMBOPLASTIN TIME PARTIAL: CPT

## 2024-05-29 NOTE — TELEPHONE ENCOUNTER
I do Apologize girls you are 100% correct, I do not see the actual form either now that I am double checking, it was only the consult notes scanned in as of now.

## 2024-05-29 NOTE — TELEPHONE ENCOUNTER
[facility] orthopedics fax over a clearance form need to be fill out and sign by doctor Merari and fax charisma is having knee surgery on next Monday further questions can the office call Ally at [phone].

## 2024-05-29 NOTE — TELEPHONE ENCOUNTER
It does not appear so. I will reach out to O to see where they faxed it.     In the meantime, Dr. Gage- okay to do without pre-op appt? Or would you like to see patient somewhere on Friday, 05/31?  Please advise. Thank you!

## 2024-05-30 NOTE — TELEPHONE ENCOUNTER
Forms printed, in Dr. Gage's folder, awaiting response from Dr. Gage in regard to appointment. Thank you!

## 2024-05-31 ENCOUNTER — OFFICE VISIT (OUTPATIENT)
Dept: FAMILY MEDICINE CLINIC | Facility: CLINIC | Age: 54
End: 2024-05-31
Payer: COMMERCIAL

## 2024-05-31 VITALS
SYSTOLIC BLOOD PRESSURE: 138 MMHG | BODY MASS INDEX: 31.45 KG/M2 | DIASTOLIC BLOOD PRESSURE: 84 MMHG | TEMPERATURE: 98.2 F | OXYGEN SATURATION: 98 % | HEIGHT: 69 IN | HEART RATE: 82 BPM

## 2024-05-31 DIAGNOSIS — E78.2 MIXED HYPERLIPIDEMIA: ICD-10-CM

## 2024-05-31 DIAGNOSIS — I10 ESSENTIAL HYPERTENSION: ICD-10-CM

## 2024-05-31 DIAGNOSIS — M25.561 RECURRENT PAIN OF RIGHT KNEE: Primary | ICD-10-CM

## 2024-05-31 DIAGNOSIS — E11.65 TYPE 2 DIABETES MELLITUS WITH HYPERGLYCEMIA, WITHOUT LONG-TERM CURRENT USE OF INSULIN (HCC): ICD-10-CM

## 2024-05-31 DIAGNOSIS — Z01.818 PRE-OP EXAMINATION: ICD-10-CM

## 2024-05-31 PROBLEM — M25.462 PAIN AND SWELLING OF LEFT KNEE: Status: RESOLVED | Noted: 2022-08-17 | Resolved: 2024-05-31

## 2024-05-31 PROBLEM — M25.562 PAIN AND SWELLING OF LEFT KNEE: Status: RESOLVED | Noted: 2022-08-17 | Resolved: 2024-05-31

## 2024-05-31 PROCEDURE — 99214 OFFICE O/P EST MOD 30 MIN: CPT | Performed by: FAMILY MEDICINE

## 2024-05-31 NOTE — ASSESSMENT & PLAN NOTE
A1c is decreased, continue metformin, will check a CMP, hemoglobin A1c prior to his next visit.  Lab Results   Component Value Date    HGBA1C 8.9 (H) 05/29/2024     Cleared for surgery, hold metformin starting Saturday.  Orders:    Comprehensive metabolic panel; Future    Hemoglobin A1C; Future

## 2024-05-31 NOTE — PROGRESS NOTES
ASSESSMENT/PLAN  Assessment & Plan  Recurrent pain of right knee         Pre-op examination         Type 2 diabetes mellitus with hyperglycemia, without long-term current use of insulin (HCC)  A1c is decreased, continue metformin, will check a CMP, hemoglobin A1c prior to his next visit.  Lab Results   Component Value Date    HGBA1C 8.9 (H) 05/29/2024     Cleared for surgery, hold metformin starting Saturday.  Orders:    Comprehensive metabolic panel; Future    Hemoglobin A1C; Future    Mixed hyperlipidemia  Controlled, continue rosuvastatin, check CMP, lipid profile prior to his next visit.       Essential hypertension  Very well-controlled, continue amlodipine.              High Risk Surgery: no  CAD: no  CHF: no  CVD: no  DM2 on insulin: no  Cr>2: no        Augustin Osborne is undergoing a Minimal risk surgery. He is at Low Risk for major adverse cardiac event (MACE). He may proceed with surgery as planned without further workup.    SUBJECTIVE  CC: Follow-up (Review labs )      HPI:  Augustin Osborne is a 53 y.o. male who is planning to undergo right knee arthroscopy under general by Dr. Boyce on 6/3/24.  Patient has not had complications with anesthesia in the past.  Functional status: normal    Review of Systems   Constitutional:  Negative for chills, fatigue and fever.   HENT:  Negative for congestion, ear pain, hearing loss, postnasal drip, rhinorrhea and sore throat.    Eyes:  Negative for pain and visual disturbance.   Respiratory:  Negative for chest tightness, shortness of breath and wheezing.    Cardiovascular:  Negative for chest pain and leg swelling.   Gastrointestinal:  Negative for abdominal distention, abdominal pain, constipation, diarrhea and vomiting.   Endocrine: Negative for cold intolerance and heat intolerance.   Genitourinary:  Negative for difficulty urinating, frequency and urgency.   Musculoskeletal:  Negative for arthralgias and gait problem.   Skin:  Negative for color change.    Neurological:  Negative for dizziness, tremors, syncope, numbness and headaches.   Hematological:  Negative for adenopathy.   Psychiatric/Behavioral:  Negative for agitation, confusion and sleep disturbance. The patient is not nervous/anxious.          Historical Information   The patient history was reviewed as follows:    Past Medical History:   Diagnosis Date    Anxiety     CAD in native artery 3/9/2015    CPAP (continuous positive airway pressure) dependence     Depression     Hyperlipidemia     Hypertension     Incarcerated umbilical hernia 1/10/2018    Sleep apnea      Past Surgical History:   Procedure Laterality Date    APPENDECTOMY      CARDIAC CATHETERIZATION      KNEE CARTILAGE SURGERY      KNEE SURGERY Left     CA LAPS REPAIR HERNIA EXCEPT INCAL/INGUN REDUCIBLE N/A 01/10/2018    Procedure: LAPAROSCOPIC UMBILICAL HERNIA REPAIR WITH MESH;  Surgeon: Lebron Kumar MD;  Location: [facility];  Service: General    CA SURGICAL ARTHROSCOPY SHOULDER W/ROTATOR CUFF RPR Left 06/04/2020    Procedure: Left shoulder arthroscopic rotator cuff repair, possible biceps tenodesis vs biceps tenodesis open;  Surgeon: Dasia Pham MD;  Location: [facility];  Service: Orthopedics     Family History   Problem Relation Age of Onset    Hypertension Mother     Coronary artery disease Mother     Hypertension Father     Heart disease Father     Coronary artery disease Father     Coronary artery disease Brother     Hypertension Brother       Social History       Medications:     Current Outpatient Medications:     amLODIPine (NORVASC) 10 mg tablet, Take 1 tablet (10 mg total) by mouth daily, Disp: 30 tablet, Rfl: 5    aspirin 81 MG tablet, Take 81 tablets by mouth daily, Disp: , Rfl:     Blood Glucose Monitoring Suppl (OneTouch Verio Reflect) w/Device KIT, Check blood sugars once daily. Please substitute with appropriate alternative as covered by patient's insurance. Dx: E11.65, Disp: 1 kit, Rfl: 0    glucose blood (OneTouch  "Verio) test strip, Check blood sugars once daily. Please substitute with appropriate alternative as covered by patient's insurance. Dx: E11.65, Disp: 100 each, Rfl: 3    hydrOXYzine pamoate (VISTARIL) 25 mg capsule, Take 1 capsule (25 mg total) by mouth 3 (three) times a day as needed for itching, Disp: 30 capsule, Rfl: 0    metFORMIN (GLUCOPHAGE) 1000 MG tablet, Take 1 tablet (1,000 mg total) by mouth 2 (two) times a day with meals, Disp: 60 tablet, Rfl: 5    OneTouch Delica Lancets 33G MISC, Check blood sugars once daily. Please substitute with appropriate alternative as covered by patient's insurance. Dx: E11.65, Disp: 100 each, Rfl: 3    rosuvastatin (CRESTOR) 10 MG tablet, Take 1 tablet (10 mg total) by mouth daily, Disp: 30 tablet, Rfl: 5    venlafaxine (EFFEXOR-XR) 150 mg 24 hr capsule, take 1 capsule by mouth once daily, Disp: 30 capsule, Rfl: 5    meloxicam (Mobic) 15 mg tablet, Take 1 tablet (15 mg total) by mouth daily (Patient not taking: Reported on 5/31/2024), Disp: 30 tablet, Rfl: 1    tobramycin (Tobrex) 0.3 % SOLN, Administer 1 drop to both eyes every 4 (four) hours while awake, Disp: 5 mL, Rfl: 0  Allergies   Allergen Reactions    Betadine [Povidone Iodine] Rash       OBJECTIVE    Vitals:   Vitals:    05/31/24 0934   BP: 138/84   Pulse: 82   Temp: 98.2 °F (36.8 °C)   SpO2: 98%   Height: 5' 9\" (1.753 m)           Physical Exam  Vitals and nursing note reviewed.   Constitutional:       General: He is not in acute distress.     Appearance: Normal appearance. He is well-developed.   HENT:      Head: Normocephalic.   Eyes:      General: No scleral icterus.     Conjunctiva/sclera: Conjunctivae normal.   Neck:      Thyroid: No thyromegaly.   Cardiovascular:      Rate and Rhythm: Normal rate and regular rhythm.      Heart sounds: Normal heart sounds. No murmur heard.  Pulmonary:      Effort: Pulmonary effort is normal. No respiratory distress.      Breath sounds: Normal breath sounds. No wheezing. "   Abdominal:      General: Bowel sounds are normal. There is no distension.      Palpations: Abdomen is soft.      Tenderness: There is no abdominal tenderness.   Musculoskeletal:         General: No tenderness. Normal range of motion.      Cervical back: Normal range of motion.      Right lower leg: No edema.      Left lower leg: No edema.   Lymphadenopathy:      Cervical: No cervical adenopathy.   Skin:     General: Skin is warm and dry.      Coloration: Skin is not pale.      Findings: No rash.   Neurological:      Mental Status: He is alert and oriented to person, place, and time.      Cranial Nerves: No cranial nerve deficit.   Psychiatric:         Behavior: Behavior normal.                Андрей Gage DO    5/31/2024  9:52 AM

## 2024-06-03 DIAGNOSIS — I10 ESSENTIAL HYPERTENSION: ICD-10-CM

## 2024-06-03 RX ORDER — AMLODIPINE BESYLATE 10 MG/1
10 TABLET ORAL DAILY
Qty: 90 TABLET | Refills: 1 | Status: SHIPPED | OUTPATIENT
Start: 2024-06-03

## 2024-06-03 NOTE — TELEPHONE ENCOUNTER
Reason for call:   [x] Refill   [] Prior Auth  [] Other:     Office:   [x] PCP/Provider - Aquiles Chandra / Mer[facility]  [] Specialty/Provider -     Medication: amlodipine    Dose/Frequency: 10mg qd    Quantity: 90    Pharmacy: [facility]/pharmacy #[id number] - [place], [place] - [address]     Does the patient have enough for 3 days?   [] Yes   [x] No - Send as HP to POD

## 2024-06-04 ENCOUNTER — TELEPHONE (OUTPATIENT)
Age: 54
End: 2024-06-04

## 2024-06-29 DIAGNOSIS — F41.9 ANXIETY: ICD-10-CM

## 2024-06-29 RX ORDER — VENLAFAXINE HYDROCHLORIDE 150 MG/1
CAPSULE, EXTENDED RELEASE ORAL
Qty: 30 CAPSULE | Refills: 5 | Status: SHIPPED | OUTPATIENT
Start: 2024-06-29

## 2024-07-08 ENCOUNTER — TELEPHONE (OUTPATIENT)
Age: 54
End: 2024-07-08

## 2024-07-08 DIAGNOSIS — N52.2 DRUG-INDUCED ERECTILE DYSFUNCTION: Primary | ICD-10-CM

## 2024-07-08 RX ORDER — SILDENAFIL 50 MG/1
50 TABLET, FILM COATED ORAL DAILY PRN
Qty: 10 TABLET | Refills: 0 | Status: SHIPPED | OUTPATIENT
Start: 2024-07-08

## 2024-07-08 NOTE — TELEPHONE ENCOUNTER
Patient called for an update, I informed him the medication was ordered. He expressed understanding.

## 2024-07-08 NOTE — TELEPHONE ENCOUNTER
"Pt inquiring if PCP could please prescribe him Viagra as he feels all of his blood pressure medications \"are messing everything up for me.\"  Preferred pharmacy pended.  Please review and advise how to proceed.  Pt would like a call back with update.  "

## 2024-07-25 DIAGNOSIS — F41.9 ANXIETY: ICD-10-CM

## 2024-07-25 RX ORDER — VENLAFAXINE HYDROCHLORIDE 150 MG/1
150 CAPSULE, EXTENDED RELEASE ORAL DAILY
Qty: 100 CAPSULE | Refills: 1 | Status: SHIPPED | OUTPATIENT
Start: 2024-07-25

## 2024-09-20 DIAGNOSIS — E11.65 TYPE 2 DIABETES MELLITUS WITH HYPERGLYCEMIA, WITHOUT LONG-TERM CURRENT USE OF INSULIN (HCC): ICD-10-CM

## 2024-09-20 DIAGNOSIS — N52.2 DRUG-INDUCED ERECTILE DYSFUNCTION: ICD-10-CM

## 2024-09-20 RX ORDER — SILDENAFIL 50 MG/1
50 TABLET, FILM COATED ORAL DAILY PRN
Qty: 10 TABLET | Refills: 2 | Status: SHIPPED | OUTPATIENT
Start: 2024-09-20

## 2024-09-20 NOTE — TELEPHONE ENCOUNTER
Reason for call:   [x] Refill   [] Prior Auth  [] Other:     Office:   [x] PCP/Provider -   [] Specialty/Provider -     Medication:     metFORMIN (GLUCOPHAGE) 1000 MG tablet 1,000 mg, Oral, 2 times daily with meals       sildenafil (VIAGRA) 50 MG tablet 50 mg, Oral, Daily PRN       Quantity: 90    [facility]    Does the patient have enough for 3 days?   [] Yes   [x] No - Send as HP to POD

## 2024-10-10 ENCOUNTER — TELEPHONE (OUTPATIENT)
Age: 54
End: 2024-10-10

## 2024-10-10 NOTE — TELEPHONE ENCOUNTER
Patient called in stating he is being denied life insurance due to medical records stating he has a heart disease in Dr Kumar records. He states he checked due to a preventative measure 15 years ago. Between that and his diabetes he is being denied. Patient is asking if this could be removed and would also like a call to explain more since he was never diagnosed with this.  please advise.

## 2024-10-10 NOTE — TELEPHONE ENCOUNTER
We can't remove any of this information.  He does have diabetes as discussed at his last visit and we did write a letter to appeal the cardiac testing.  Unfortunately this is what the insurance companies look for to deny people.

## 2024-10-11 NOTE — TELEPHONE ENCOUNTER
It is under letters, from February.  I have not idea what to tell him about insurance companies, they look for any reason to deny someone.

## 2024-10-12 DIAGNOSIS — E11.65 TYPE 2 DIABETES MELLITUS WITH HYPERGLYCEMIA, WITHOUT LONG-TERM CURRENT USE OF INSULIN (HCC): ICD-10-CM

## 2024-10-13 NOTE — PROGRESS NOTES
Adult Annual Physical  Name: Augustin Osborne      : 1970      MRN: [id number]  Encounter Provider: Katiana Jose PA-C  Encounter Date: 10/14/2024   Encounter department: [facility] [address]    Assessment & Plan  Annual physical exam           BMI 31.0-31.9,adult           Immunizations and preventive care screenings were discussed with patient today. Appropriate education was printed on patient's after visit summary.    {Prostate cancer screening - consider in males between age of 40-75 depending on age, race, and risk factors. There is difference in the guidelines in regards to the optimal age for screening.  USPSTF states to consider periodic screening in males between the age of 50 to 69. This text is informational and does not need to be selected but if you wish, you can insert standard documentation in your progress note by using F2 (Optional):35729}    Counseling:  {Annual Physical; Counselin}         History of Present Illness   {?Quick Links Encounters * My Last Note * Last Note in Specialty * Snapshot * Since Last Visit * History :77812}  Adult Annual Physical  Review of Systems  {Select to Display PMH (Optional):88393}    Objective   {?Quick Links Trend Vitals * Enter New Vitals * Results Review * Timeline (Adult) * Labs * Imaging * Cardiology * Procedures * Lung Cancer Screening * Surgical eConsent :17093}  There were no vitals taken for this visit.    Physical Exam  {Administrative / Billing Section (Optional):33834}

## 2024-10-13 NOTE — PROGRESS NOTES
Adult Annual Physical  Name: Augustin Osborne      : 1970      MRN: [id number]  Encounter Provider: Katiana Jose PA-C  Encounter Date: 10/14/2024   Encounter department: [facility] [address]    Assessment & Plan  Annual physical exam         BMI 31.0-31.9,adult         Essential hypertension         Type 2 diabetes mellitus with hyperglycemia, without long-term current use of insulin (HCC)    Lab Results   Component Value Date    HGBA1C 8.9 (H) 2024            Mixed hyperlipidemia  -Continue Crestor 10mg daily   -Lipid panel ordered       Immunizations and preventive care screenings were discussed with patient today. Appropriate education was printed on patient's after visit summary.    Discussed risks and benefits of prostate cancer screening. We discussed the controversial history of PSA screening for prostate cancer in the [place] as well as the risk of over detection and over treatment of prostate cancer by way of PSA screening.  The patient understands that PSA blood testing is an imperfect way to screen for prostate cancer and that elevated PSA levels in the blood may also be caused by infection, inflammation, prostatic trauma or manipulation, urological procedures, or by benign prostatic enlargement.    The role of the digital rectal examination in prostate cancer screening was also discussed and I discussed with him that there is large interobserver variability in the findings of digital rectal examination.    Counseling:  Alcohol/drug use: discussed moderation in alcohol intake, the recommendations for healthy alcohol use, and avoidance of illicit drug use.  Dental Health: discussed importance of regular tooth brushing, flossing, and dental visits.         History of Present Illness   Patient presents to the office for annual physical.     Notes ***     {?Quick Links Encounters * My Last Note * Last Note in Specialty * Snapshot * Since Last Visit *  History :32377}  Adult Annual Physical  Review of Systems  Review of Systems - Negative except ***        {Select to Display PMH (Optional):01250}    Objective   {?Quick Links Trend Vitals * Enter New Vitals * Results Review * Timeline (Adult) * Labs * Imaging * Cardiology * Procedures * Lung Cancer Screening * Surgical eConsent :11029}  There were no vitals taken for this visit.    Physical Exam    {Administrative / Billing Section (Optional):66963}

## 2024-10-13 NOTE — PATIENT INSTRUCTIONS
"Patient Education     Routine physical for adults   The Basics   Written by the doctors and editors at [facility]   What is a physical? -- A physical is a routine visit, or \"check-up,\" with your doctor. You might also hear it called a \"wellness visit\" or \"preventive visit.\"  During each visit, the doctor will:   Ask about your physical and mental health   Ask about your habits, behaviors, and lifestyle   Do an exam   Give you vaccines if needed   Talk to you about any medicines you take   Give advice about your health   Answer your questions  Getting regular check-ups is an important part of taking care of your health. It can help your doctor find and treat any problems you have. But it's also important for preventing health problems.  A routine physical is different from a \"sick visit.\" A sick visit is when you see a doctor because of a health concern or problem. Since physicals are scheduled ahead of time, you can think about what you want to ask the doctor.  How often should I get a physical? -- It depends on your age and health. In general, for people age 21 years and older:   If you are younger than 50 years, you might be able to get a physical every 3 years.   If you are 50 years or older, your doctor might recommend a physical every year.  If you have an ongoing health condition, like diabetes or high blood pressure, your doctor will probably want to see you more often.  What happens during a physical? -- In general, each visit will include:   Physical exam - The doctor or nurse will check your height, weight, heart rate, and blood pressure. They will also look at your eyes and ears. They will ask about how you are feeling and whether you have any symptoms that bother you.   Medicines - It's a good idea to bring a list of all the medicines you take to each doctor visit. Your doctor will talk to you about your medicines and answer any questions. Tell them if you are having any side effects that bother you. You " "should also tell them if you are having trouble paying for any of your medicines.   Habits and behaviors - This includes:   Your diet   Your exercise habits   Whether you smoke, drink alcohol, or use drugs   Whether you are sexually active   Whether you feel safe at home  Your doctor will talk to you about things you can do to improve your health and lower your risk of health problems. They will also offer help and support. For example, if you want to quit smoking, they can give you advice and might prescribe medicines. If you want to improve your diet or get more physical activity, they can help you with this, too.   Lab tests, if needed - The tests you get will depend on your age and situation. For example, your doctor might want to check your:   Cholesterol   Blood sugar   Iron level   Vaccines - The recommended vaccines will depend on your age, health, and what vaccines you already had. Vaccines are very important because they can prevent certain serious or deadly infections.   Discussion of screening - \"Screening\" means checking for diseases or other health problems before they cause symptoms. Your doctor can recommend screening based on your age, risk, and preferences. This might include tests to check for:   Cancer, such as breast, prostate, cervical, ovarian, colorectal, prostate, lung, or skin cancer   Sexually transmitted infections, such as chlamydia and gonorrhea   Mental health conditions like depression and anxiety  Your doctor will talk to you about the different types of screening tests. They can help you decide which screenings to have. They can also explain what the results might mean.   Answering questions - The physical is a good time to ask the doctor or nurse questions about your health. If needed, they can refer you to other doctors or specialists, too.  Adults older than 65 years often need other care, too. As you get older, your doctor will talk to you about:   How to prevent falling at " home   Hearing or vision tests   Memory testing   How to take your medicines safely   Making sure that you have the help and support you need at home  All topics are updated as new evidence becomes available and our peer review process is complete.  This topic retrieved from [employer] on: May 02, 2024.  Topic 163247 Version 1.0  Release: 32.4.3 - C32.122  © 2024 UpToDate, Inc. and/or its affiliates. All rights reserved.  Consumer Information Use and Disclaimer   Disclaimer: This generalized information is a limited summary of diagnosis, treatment, and/or medication information. It is not meant to be comprehensive and should be used as a tool to help the user understand and/or assess potential diagnostic and treatment options. It does NOT include all information about conditions, treatments, medications, side effects, or risks that may apply to a specific patient. It is not intended to be medical advice or a substitute for the medical advice, diagnosis, or treatment of a health care provider based on the health care provider's examination and assessment of a patient's specific and unique circumstances. Patients must speak with a health care provider for complete information about their health, medical questions, and treatment options, including any risks or benefits regarding use of medications. This information does not endorse any treatments or medications as safe, effective, or approved for treating a specific patient. UpToDate, Inc. and its affiliates disclaim any warranty or liability relating to this information or the use thereof.The use of this information is governed by the Terms of Use, available at https://www.woltersCenoplexuwer.com/en/know/clinical-effectiveness-terms. 2024© UpToDate, Inc. and its affiliates and/or licensors. All rights reserved.  Copyright   © 2024 UpToDate, Inc. and/or its affiliates. All rights reserved.    Patient Education     Routine physical for adults   The Basics   Written by the  "do[facility] and editors at [facility]   What is a physical? -- A physical is a routine visit, or \"check-up,\" with your doctor. You might also hear it called a \"wellness visit\" or \"preventive visit.\"  During each visit, the doctor will:   Ask about your physical and mental health   Ask about your habits, behaviors, and lifestyle   Do an exam   Give you vaccines if needed   Talk to you about any medicines you take   Give advice about your health   Answer your questions  Getting regular check-ups is an important part of taking care of your health. It can help your doctor find and treat any problems you have. But it's also important for preventing health problems.  A routine physical is different from a \"sick visit.\" A sick visit is when you see a doctor because of a health concern or problem. Since physicals are scheduled ahead of time, you can think about what you want to ask the doctor.  How often should I get a physical? -- It depends on your age and health. In general, for people age 21 years and older:   If you are younger than 50 years, you might be able to get a physical every 3 years.   If you are 50 years or older, your doctor might recommend a physical every year.  If you have an ongoing health condition, like diabetes or high blood pressure, your doctor will probably want to see you more often.  What happens during a physical? -- In general, each visit will include:   Physical exam - The doctor or nurse will check your height, weight, heart rate, and blood pressure. They will also look at your eyes and ears. They will ask about how you are feeling and whether you have any symptoms that bother you.   Medicines - It's a good idea to bring a list of all the medicines you take to each doctor visit. Your doctor will talk to you about your medicines and answer any questions. Tell them if you are having any side effects that bother you. You should also tell them if you are having trouble paying for any of your " "medicines.   Habits and behaviors - This includes:   Your diet   Your exercise habits   Whether you smoke, drink alcohol, or use drugs   Whether you are sexually active   Whether you feel safe at home  Your doctor will talk to you about things you can do to improve your health and lower your risk of health problems. They will also offer help and support. For example, if you want to quit smoking, they can give you advice and might prescribe medicines. If you want to improve your diet or get more physical activity, they can help you with this, too.   Lab tests, if needed - The tests you get will depend on your age and situation. For example, your doctor might want to check your:   Cholesterol   Blood sugar   Iron level   Vaccines - The recommended vaccines will depend on your age, health, and what vaccines you already had. Vaccines are very important because they can prevent certain serious or deadly infections.   Discussion of screening - \"Screening\" means checking for diseases or other health problems before they cause symptoms. Your doctor can recommend screening based on your age, risk, and preferences. This might include tests to check for:   Cancer, such as breast, prostate, cervical, ovarian, colorectal, prostate, lung, or skin cancer   Sexually transmitted infections, such as chlamydia and gonorrhea   Mental health conditions like depression and anxiety  Your doctor will talk to you about the different types of screening tests. They can help you decide which screenings to have. They can also explain what the results might mean.   Answering questions - The physical is a good time to ask the doctor or nurse questions about your health. If needed, they can refer you to other doctors or specialists, too.  Adults older than 65 years often need other care, too. As you get older, your doctor will talk to you about:   How to prevent falling at home   Hearing or vision tests   Memory testing   How to take your " medicines safely   Making sure that you have the help and support you need at home  All topics are updated as new evidence becomes available and our peer review process is complete.  This topic retrieved from [employer] on: May 02, 2024.  Topic 492780 Version 1.0  Release: 32.4.3 - C32.122  © 2024 UpToDate, Inc. and/or its affiliates. All rights reserved.  Consumer Information Use and Disclaimer   Disclaimer: This generalized information is a limited summary of diagnosis, treatment, and/or medication information. It is not meant to be comprehensive and should be used as a tool to help the user understand and/or assess potential diagnostic and treatment options. It does NOT include all information about conditions, treatments, medications, side effects, or risks that may apply to a specific patient. It is not intended to be medical advice or a substitute for the medical advice, diagnosis, or treatment of a health care provider based on the health care provider's examination and assessment of a patient's specific and unique circumstances. Patients must speak with a health care provider for complete information about their health, medical questions, and treatment options, including any risks or benefits regarding use of medications. This information does not endorse any treatments or medications as safe, effective, or approved for treating a specific patient. UpToDate, Inc. and its affiliates disclaim any warranty or liability relating to this information or the use thereof.The use of this information is governed by the Terms of Use, available at https://www.woltersEPIOMED THERAPEUTICSuwer.com/en/know/clinical-effectiveness-terms. 2024© UpToDate, Inc. and its affiliates and/or licensors. All rights reserved.  Copyright   © 2024 UpToDate, Inc. and/or its affiliates. All rights reserved.

## 2024-10-14 ENCOUNTER — OFFICE VISIT (OUTPATIENT)
Dept: FAMILY MEDICINE CLINIC | Facility: CLINIC | Age: 54
End: 2024-10-14
Payer: COMMERCIAL

## 2024-10-14 VITALS
HEIGHT: 69 IN | TEMPERATURE: 97.9 F | BODY MASS INDEX: 31.84 KG/M2 | WEIGHT: 215 LBS | HEART RATE: 92 BPM | SYSTOLIC BLOOD PRESSURE: 158 MMHG | DIASTOLIC BLOOD PRESSURE: 100 MMHG | OXYGEN SATURATION: 98 %

## 2024-10-14 DIAGNOSIS — E78.2 MIXED HYPERLIPIDEMIA: ICD-10-CM

## 2024-10-14 DIAGNOSIS — I10 ESSENTIAL HYPERTENSION: ICD-10-CM

## 2024-10-14 DIAGNOSIS — E11.65 TYPE 2 DIABETES MELLITUS WITH HYPERGLYCEMIA, WITHOUT LONG-TERM CURRENT USE OF INSULIN (HCC): Primary | ICD-10-CM

## 2024-10-14 LAB
CREAT UR-MCNC: 178.6 MG/DL
LEFT EYE DIABETIC RETINOPATHY: NORMAL
LEFT EYE IMAGE QUALITY: NORMAL
LEFT EYE MACULAR EDEMA: NORMAL
LEFT EYE OTHER RETINOPATHY: NORMAL
MICROALBUMIN UR-MCNC: 107.4 MG/L
MICROALBUMIN/CREAT 24H UR: 60 MG/G CREATININE (ref 0–30)
RIGHT EYE DIABETIC RETINOPATHY: NORMAL
RIGHT EYE IMAGE QUALITY: NORMAL
RIGHT EYE MACULAR EDEMA: NORMAL
RIGHT EYE OTHER RETINOPATHY: NORMAL
SEVERITY (EYE EXAM): NORMAL
SL AMB POCT HEMOGLOBIN AIC: 7.9 (ref ?–6.5)

## 2024-10-14 PROCEDURE — 83036 HEMOGLOBIN GLYCOSYLATED A1C: CPT

## 2024-10-14 PROCEDURE — 82570 ASSAY OF URINE CREATININE: CPT

## 2024-10-14 PROCEDURE — 92250 FUNDUS PHOTOGRAPHY W/I&R: CPT

## 2024-10-14 PROCEDURE — 82043 UR ALBUMIN QUANTITATIVE: CPT

## 2024-10-14 PROCEDURE — 99214 OFFICE O/P EST MOD 30 MIN: CPT

## 2024-10-14 RX ORDER — LISINOPRIL 10 MG/1
10 TABLET ORAL DAILY
Qty: 30 TABLET | Refills: 2 | Status: SHIPPED | OUTPATIENT
Start: 2024-10-14

## 2024-10-14 RX ORDER — ROSUVASTATIN CALCIUM 10 MG/1
10 TABLET, COATED ORAL DAILY
Qty: 30 TABLET | Refills: 5 | Status: SHIPPED | OUTPATIENT
Start: 2024-10-14

## 2024-10-14 RX ORDER — AMLODIPINE BESYLATE 5 MG/1
5 TABLET ORAL DAILY
Qty: 30 TABLET | Refills: 2 | Status: SHIPPED | OUTPATIENT
Start: 2024-10-14

## 2024-10-14 NOTE — ASSESSMENT & PLAN NOTE
-Downtrending A1c, continue current management and follow with PCP   Lab Results   Component Value Date    HGBA1C 7.9 (A) 10/14/2024       Orders:    POCT urine microalbumin/creatinine    IRIS Diabetic eye exam    POCT hemoglobin A1c

## 2024-10-14 NOTE — ASSESSMENT & PLAN NOTE
-Swelling in b/l legs and BP still elevated 146/88.   -Titrate down on Amlodipine 5mg daily and start lisinopril 10mg daily  -BP check at physical  Orders:    lisinopril (ZESTRIL) 10 mg tablet; Take 1 tablet (10 mg total) by mouth daily    amLODIPine (NORVASC) 5 mg tablet; Take 1 tablet (5 mg total) by mouth daily

## 2024-10-14 NOTE — PROGRESS NOTES
Ambulatory Visit  Name: Augustin Osborne      : 1970      MRN: [id number]  Encounter Provider: Katiana Jose PA-C  Encounter Date: 10/14/2024   Encounter department: [facility] [address]    Assessment & Plan  Type 2 diabetes mellitus with hyperglycemia, without long-term current use of insulin (HCC)  -Downtrending A1c, continue current management and follow with PCP   Lab Results   Component Value Date    HGBA1C 7.9 (A) 10/14/2024       Orders:    POCT urine microalbumin/creatinine    IRIS Diabetic eye exam    POCT hemoglobin A1c    BMI 31.0-31.9,adult         Essential hypertension  -Swelling in b/l legs and BP still elevated 146/88.   -Titrate down on Amlodipine 5mg daily and start lisinopril 10mg daily  -BP check at physical  Orders:    lisinopril (ZESTRIL) 10 mg tablet; Take 1 tablet (10 mg total) by mouth daily    amLODIPine (NORVASC) 5 mg tablet; Take 1 tablet (5 mg total) by mouth daily    Mixed hyperlipidemia  -Continue Crestor 10mg daily  Orders:    rosuvastatin (CRESTOR) 10 MG tablet; Take 1 tablet (10 mg total) by mouth daily      Tobacco Cessation Counseling: The patient is sincerely urged to quit consumption of tobacco. He is not ready to quit tobacco.       History of Present Illness     HPI  Patient presents to office for diabetes follow up and physical for work.     His BP is elevated in office today at 158/100. He notes this has been a problem over the past couple of months. He has been taking amplodipine 10 mg for about 4 years. Repeat 146/88.     He notes he has had some b/l ankle swelling at the end of the day. Denies pain, SOB, skin changes. The swelling eventually goes away on its own. It started when taking the amlodipine.     A1C is 7.9 today.     He notes he has some right knee pain. His right knee has been aching since his procedure with Dr Webb 2024. He is going to call their office.           Review of Systems   Constitutional:   "Negative for chills, fatigue and fever.   HENT:  Negative for congestion, ear pain, rhinorrhea, sinus pain and sore throat.    Eyes:  Negative for pain and visual disturbance.   Respiratory:  Negative for cough and shortness of breath.    Cardiovascular:  Negative for chest pain, palpitations and leg swelling.   Gastrointestinal:  Negative for abdominal pain, constipation, diarrhea, nausea and vomiting.   Genitourinary:  Negative for difficulty urinating, dysuria, frequency, hematuria and urgency.   Musculoskeletal:  Negative for arthralgias, back pain and neck pain.   Skin:  Negative for color change and rash.   Neurological:  Negative for dizziness, seizures, syncope and headaches.   Psychiatric/Behavioral:  Negative for sleep disturbance.    All other systems reviewed and are negative.          Objective     /100 (BP Location: Left arm, Patient Position: Sitting, Cuff Size: Large)   Pulse 92   Temp 97.9 °F (36.6 °C) (Tympanic)   Ht 5' 9\" (1.753 m)   Wt 97.5 kg (215 lb)   SpO2 98%   BMI 31.75 kg/m²     Physical Exam  Vitals reviewed.   Constitutional:       General: He is not in acute distress.     Appearance: Normal appearance.   HENT:      Head: Normocephalic and atraumatic.      Right Ear: External ear normal.      Left Ear: External ear normal.      Nose: Nose normal.      Mouth/Throat:      Mouth: Mucous membranes are moist.   Eyes:      Extraocular Movements: Extraocular movements intact.      Conjunctiva/sclera: Conjunctivae normal.   Cardiovascular:      Rate and Rhythm: Normal rate and regular rhythm.      Pulses: no weak pulses.           Dorsalis pedis pulses are 2+ on the right side and 2+ on the left side.        Posterior tibial pulses are 2+ on the right side and 2+ on the left side.      Heart sounds: Normal heart sounds.   Pulmonary:      Effort: Pulmonary effort is normal.      Breath sounds: Normal breath sounds. No wheezing, rhonchi or rales.   Abdominal:      General: Bowel sounds " are normal. There is no distension.      Palpations: Abdomen is soft.      Tenderness: There is no abdominal tenderness.   Musculoskeletal:      Cervical back: Neck supple.      Right lower leg: No edema.      Left lower leg: No edema.   Feet:      Right foot:      Skin integrity: No ulcer, skin breakdown, erythema, warmth, callus or dry skin.      Left foot:      Skin integrity: No ulcer, skin breakdown, erythema, warmth, callus or dry skin.   Lymphadenopathy:      Cervical: No cervical adenopathy.   Skin:     General: Skin is warm.      Capillary Refill: Capillary refill takes less than 2 seconds.      Findings: No rash.   Neurological:      Mental Status: He is alert. Mental status is at baseline.       Diabetic Foot Exam    Patient's shoes and socks removed.    Right Foot/Ankle   Right Foot Inspection  Skin Exam: skin normal and skin intact. No dry skin, no warmth, no callus, no erythema, no maceration, no abnormal color, no pre-ulcer, no ulcer and no callus.     Toe Exam: ROM and strength within normal limits.     Sensory   Vibration: intact  Proprioception: intact  Monofilament testing: intact    Vascular  Capillary refills: < 3 seconds  The right DP pulse is 2+. The right PT pulse is 2+.     Left Foot/Ankle  Left Foot Inspection  Skin Exam: skin normal and skin intact. No dry skin, no warmth, no erythema, no maceration, normal color, no pre-ulcer, no ulcer and no callus.     Toe Exam: ROM and strength within normal limits.     Sensory   Vibration: intact  Proprioception: intact  Monofilament testing: intact    Vascular  Capillary refills: < 3 seconds  The left DP pulse is 2+. The left PT pulse is 2+.     Assign Risk Category  No deformity present  No loss of protective sensation  No weak pulses  Risk: 0

## 2024-10-14 NOTE — ASSESSMENT & PLAN NOTE
-Continue Crestor 10mg daily  Orders:    rosuvastatin (CRESTOR) 10 MG tablet; Take 1 tablet (10 mg total) by mouth daily

## 2024-11-05 DIAGNOSIS — I10 ESSENTIAL HYPERTENSION: ICD-10-CM

## 2024-11-05 DIAGNOSIS — E78.2 MIXED HYPERLIPIDEMIA: ICD-10-CM

## 2024-11-06 RX ORDER — LISINOPRIL 10 MG/1
10 TABLET ORAL DAILY
Qty: 90 TABLET | Refills: 1 | Status: SHIPPED | OUTPATIENT
Start: 2024-11-06

## 2024-11-06 RX ORDER — ROSUVASTATIN CALCIUM 10 MG/1
10 TABLET, COATED ORAL DAILY
Qty: 90 TABLET | Refills: 1 | Status: SHIPPED | OUTPATIENT
Start: 2024-11-06

## 2024-11-06 RX ORDER — AMLODIPINE BESYLATE 5 MG/1
5 TABLET ORAL DAILY
Qty: 90 TABLET | Refills: 1 | Status: SHIPPED | OUTPATIENT
Start: 2024-11-06

## 2024-11-18 DIAGNOSIS — E11.65 TYPE 2 DIABETES MELLITUS WITH HYPERGLYCEMIA, WITHOUT LONG-TERM CURRENT USE OF INSULIN (HCC): Primary | ICD-10-CM

## 2024-12-06 ENCOUNTER — TELEPHONE (OUTPATIENT)
Dept: CARDIOLOGY CLINIC | Facility: CLINIC | Age: 54
End: 2024-12-06

## 2024-12-06 NOTE — TELEPHONE ENCOUNTER
Caller: Ramirez    Doctor: not established     Reason for call: patient called in today because he wanted to schedule something with Dr. King due to the fact that he is trying to get [employer] but they are giving him a hard time because he had a heart procedure done back In 2015. He mentioned that Dr. King was one of the Referring providers as well as his PCP Dr. Gage but ultimately he had the procedure done with Dr. Gregorio Kenyon a [facility] Cardiologist.     He was asking me which would be the best option for him to get a letter stating the procedure was done as a precaution, I advised him that the provider that performed the procedure would be the best option to get paperwork done by. [facility]       I'm just documenting this so the next rep has an idea of what is going on. Thank You

## 2024-12-11 ENCOUNTER — NURSE TRIAGE (OUTPATIENT)
Age: 54
End: 2024-12-11

## 2024-12-11 DIAGNOSIS — I10 ESSENTIAL HYPERTENSION: ICD-10-CM

## 2024-12-11 RX ORDER — AMLODIPINE BESYLATE 10 MG/1
10 TABLET ORAL DAILY
Qty: 90 TABLET | Refills: 1 | Status: SHIPPED | OUTPATIENT
Start: 2024-12-11

## 2024-12-11 NOTE — TELEPHONE ENCOUNTER
Double the amlodipine to 10mg, I sent in the new dose to [facility] so when he fills it he will only have to take one.

## 2024-12-11 NOTE — TELEPHONE ENCOUNTER
"Last night and this morning \"migraine-like\" headache. Took automatic bp readings.180/114 and 5 minutes later 179/112. Unsure if he can double up on blood pressure medication. Did not take bp reading yesterday but bp has been controlled previously per patient. Please see triage questions. Patient advised to be seen. No available appointment at office. Advised patient to report to  for evaluation. Patient will \"think about it\". Advised patient to return call if he has any further questions or concerns, otherwise, go to  as advised.    Reason for Disposition   Systolic BP >= 180 OR Diastolic >= 110    Answer Assessment - Initial Assessment Questions  1. BLOOD PRESSURE: \"What is your blood pressure?\" \"Did you take at least two measurements 5 minutes apart?\"      180/114 and then 179/112  2. ONSET: \"When did you take your blood pressure?\"      Took BP med, Norvasc, this morning around 0900. Does not have medication bottle with him to confirm medication.  3. HOW: \"How did you take your blood pressure?\" (e.g., automatic home BP monitor, visiting nurse)      Auto  4. HISTORY: \"Do you have a history of high blood pressure?\"      Has had high bp for 4 years  5. MEDICINES: \"Are you taking any medicines for blood pressure?\" \"Have you missed any doses recently?\"      Taken norvasc. Has not missed dose.  6. OTHER SYMPTOMS: \"Do you have any symptoms?\" (e.g., blurred vision, chest pain, difficulty breathing, headache, weakness)      Migraine-quality headache starting last night around 9pm and continued when he woke up.    Protocols used: Blood Pressure - High-Adult-OH    "

## 2024-12-30 DIAGNOSIS — N52.2 DRUG-INDUCED ERECTILE DYSFUNCTION: ICD-10-CM

## 2024-12-30 RX ORDER — SILDENAFIL 50 MG/1
50 TABLET, FILM COATED ORAL DAILY PRN
Qty: 10 TABLET | Refills: 3 | Status: SHIPPED | OUTPATIENT
Start: 2024-12-30

## 2024-12-30 NOTE — TELEPHONE ENCOUNTER
Reason for call:   [x] Refill   [] Prior Auth  [x] Other: Patient is requesting more than 10 tablets at a time.     Office:   [x] PCP/Provider - Андрей Gage  [] Specialty/Provider -     Medication: sildenafil (VIAGRA) 50 MG tablet     Dose/Frequency: Take 1 tablet (50 mg total) by mouth daily as needed     Quantity: 10    Pharmacy: [facility] [place] - [address]    Does the patient have enough for 3 days?   [] Yes   [x] No - Send as HP to POD

## 2025-01-21 DIAGNOSIS — F41.9 ANXIETY: ICD-10-CM

## 2025-01-21 RX ORDER — VENLAFAXINE HYDROCHLORIDE 150 MG/1
150 CAPSULE, EXTENDED RELEASE ORAL DAILY
Qty: 90 CAPSULE | Refills: 1 | Status: SHIPPED | OUTPATIENT
Start: 2025-01-21

## 2025-01-22 ENCOUNTER — APPOINTMENT (OUTPATIENT)
Dept: LAB | Facility: HOSPITAL | Age: 55
End: 2025-01-22
Payer: COMMERCIAL

## 2025-01-22 DIAGNOSIS — E11.65 TYPE 2 DIABETES MELLITUS WITH HYPERGLYCEMIA, WITHOUT LONG-TERM CURRENT USE OF INSULIN (HCC): ICD-10-CM

## 2025-01-22 LAB
ALBUMIN SERPL BCG-MCNC: 4.3 G/DL (ref 3.5–5)
ALP SERPL-CCNC: 64 U/L (ref 34–104)
ALT SERPL W P-5'-P-CCNC: 27 U/L (ref 7–52)
ANION GAP SERPL CALCULATED.3IONS-SCNC: 6 MMOL/L (ref 4–13)
AST SERPL W P-5'-P-CCNC: 19 U/L (ref 13–39)
BILIRUB SERPL-MCNC: 0.57 MG/DL (ref 0.2–1)
BUN SERPL-MCNC: 12 MG/DL (ref 5–25)
CALCIUM SERPL-MCNC: 9.2 MG/DL (ref 8.4–10.2)
CHLORIDE SERPL-SCNC: 104 MMOL/L (ref 96–108)
CO2 SERPL-SCNC: 28 MMOL/L (ref 21–32)
CREAT SERPL-MCNC: 0.96 MG/DL (ref 0.6–1.3)
CREAT UR-MCNC: 177.7 MG/DL
EST. AVERAGE GLUCOSE BLD GHB EST-MCNC: 177 MG/DL
GFR SERPL CREATININE-BSD FRML MDRD: 88 ML/MIN/1.73SQ M
GLUCOSE P FAST SERPL-MCNC: 151 MG/DL (ref 65–99)
HBA1C MFR BLD: 7.8 %
MICROALBUMIN UR-MCNC: 33 MG/L
MICROALBUMIN/CREAT 24H UR: 19 MG/G CREATININE (ref 0–30)
POTASSIUM SERPL-SCNC: 4.3 MMOL/L (ref 3.5–5.3)
PROT SERPL-MCNC: 7.1 G/DL (ref 6.4–8.4)
SODIUM SERPL-SCNC: 138 MMOL/L (ref 135–147)

## 2025-01-22 PROCEDURE — 82570 ASSAY OF URINE CREATININE: CPT

## 2025-01-22 PROCEDURE — 82043 UR ALBUMIN QUANTITATIVE: CPT

## 2025-01-22 PROCEDURE — 83036 HEMOGLOBIN GLYCOSYLATED A1C: CPT

## 2025-01-22 PROCEDURE — 80053 COMPREHEN METABOLIC PANEL: CPT

## 2025-01-22 PROCEDURE — 36415 COLL VENOUS BLD VENIPUNCTURE: CPT

## 2025-02-03 ENCOUNTER — OFFICE VISIT (OUTPATIENT)
Dept: FAMILY MEDICINE CLINIC | Facility: CLINIC | Age: 55
End: 2025-02-03
Payer: COMMERCIAL

## 2025-02-03 VITALS
WEIGHT: 211 LBS | SYSTOLIC BLOOD PRESSURE: 136 MMHG | OXYGEN SATURATION: 99 % | DIASTOLIC BLOOD PRESSURE: 86 MMHG | HEIGHT: 69 IN | HEART RATE: 79 BPM | BODY MASS INDEX: 31.25 KG/M2

## 2025-02-03 DIAGNOSIS — I10 ESSENTIAL HYPERTENSION: ICD-10-CM

## 2025-02-03 DIAGNOSIS — N52.2 DRUG-INDUCED ERECTILE DYSFUNCTION: ICD-10-CM

## 2025-02-03 DIAGNOSIS — E11.65 TYPE 2 DIABETES MELLITUS WITH HYPERGLYCEMIA, WITHOUT LONG-TERM CURRENT USE OF INSULIN (HCC): ICD-10-CM

## 2025-02-03 DIAGNOSIS — E78.2 MIXED HYPERLIPIDEMIA: ICD-10-CM

## 2025-02-03 DIAGNOSIS — Z00.00 ANNUAL PHYSICAL EXAM: Primary | ICD-10-CM

## 2025-02-03 PROBLEM — M75.102 TEAR OF LEFT ROTATOR CUFF: Status: RESOLVED | Noted: 2020-06-04 | Resolved: 2025-02-03

## 2025-02-03 PROBLEM — M62.08 DIASTASIS RECTI: Status: RESOLVED | Noted: 2023-02-14 | Resolved: 2025-02-03

## 2025-02-03 PROCEDURE — 99214 OFFICE O/P EST MOD 30 MIN: CPT | Performed by: FAMILY MEDICINE

## 2025-02-03 PROCEDURE — 99396 PREV VISIT EST AGE 40-64: CPT | Performed by: FAMILY MEDICINE

## 2025-02-03 RX ORDER — SILDENAFIL 50 MG/1
50 TABLET, FILM COATED ORAL DAILY PRN
Qty: 50 TABLET | Refills: 1 | Status: SHIPPED | OUTPATIENT
Start: 2025-02-03

## 2025-02-03 RX ORDER — TIRZEPATIDE 2.5 MG/.5ML
2.5 INJECTION, SOLUTION SUBCUTANEOUS WEEKLY
Qty: 2 ML | Refills: 0 | Status: SHIPPED | OUTPATIENT
Start: 2025-02-03 | End: 2025-02-07

## 2025-02-03 NOTE — ASSESSMENT & PLAN NOTE
Continue metformin, start Mounjaro  2.5 mg weekly for 4 weeks titrating up monthly to goal of 12.5 to 15 mg.  Lab Results   Component Value Date    HGBA1C 7.8 (H) 01/22/2025     Orders:  •  Tirzepatide (Mounjaro) 2.5 MG/0.5ML SOAJ; Inject 2.5 mg under the skin once a week  •  Comprehensive metabolic panel; Future  •  Hemoglobin A1C; Future

## 2025-02-03 NOTE — PROGRESS NOTES
Adult Annual Physical  Name: Augustin Osborne      : 1970      MRN: [id number]  Encounter Provider: Андрей Gage DO  Encounter Date: 2/3/2025   Encounter department: [facility] [address]    Assessment & Plan  Annual physical exam         Essential hypertension  Controlled, continue amlodipine, lisinopril       Type 2 diabetes mellitus with hyperglycemia, without long-term current use of insulin (HCC)  Continue metformin, start Mounjaro  2.5 mg weekly for 4 weeks titrating up monthly to goal of 12.5 to 15 mg.  Lab Results   Component Value Date    HGBA1C 7.8 (H) 2025     Orders:  •  Tirzepatide (Mounjaro) 2.5 MG/0.5ML SOAJ; Inject 2.5 mg under the skin once a week  •  Comprehensive metabolic panel; Future  •  Hemoglobin A1C; Future    Mixed hyperlipidemia  Controlled, continue rosuvastatin.       Drug-induced erectile dysfunction    Orders:  •  sildenafil (VIAGRA) 50 MG tablet; Take 1 tablet (50 mg total) by mouth daily as needed for erectile dysfunction      Immunizations and preventive care screenings were discussed with patient today. Appropriate education was printed on patient's after visit summary.    Discussed risks and benefits of prostate cancer screening. We discussed the controversial history of PSA screening for prostate cancer in the [place] as well as the risk of over detection and over treatment of prostate cancer by way of PSA screening.  The patient understands that PSA blood testing is an imperfect way to screen for prostate cancer and that elevated PSA levels in the blood may also be caused by infection, inflammation, prostatic trauma or manipulation, urological procedures, or by benign prostatic enlargement.    The role of the digital rectal examination in prostate cancer screening was also discussed and I discussed with him that there is large interobserver variability in the findings of digital rectal examination.    Counseling:  Dental  "Health: discussed importance of regular tooth brushing, flossing, and dental visits.      Depression Screening and Follow-up Plan: Patient was screened for depression during today's encounter. They screened negative with a PHQ-2 score of 2.        History of Present Illness     Adult Annual Physical:  Patient presents for annual physical. Patient was in today for checkup, he states that he is doing well.  He did get his blood work done, and this was reviewed with him today..     Diet and Physical Activity:  - Diet/Nutrition: diabetic diet.  - Exercise: moderate cardiovascular exercise.    Depression Screening:  - PHQ-2 Score: 2    General Health:  - Sleep: sleeps well.  - Hearing: normal hearing bilateral ears.  - Vision: no vision problems.  - Dental: regular dental visits.     Health:    - Urinary symptoms: none.     Review of Systems   Constitutional:  Negative for chills, fatigue and fever.   HENT:  Negative for congestion, ear pain, hearing loss, postnasal drip, rhinorrhea and sore throat.    Eyes:  Negative for pain and visual disturbance.   Respiratory:  Negative for chest tightness, shortness of breath and wheezing.    Cardiovascular:  Negative for chest pain and leg swelling.   Gastrointestinal:  Negative for abdominal distention, abdominal pain, constipation, diarrhea and vomiting.   Endocrine: Negative for cold intolerance and heat intolerance.   Genitourinary:  Negative for difficulty urinating, frequency and urgency.   Musculoskeletal:  Negative for arthralgias and gait problem.   Skin:  Negative for color change.   Neurological:  Negative for dizziness, tremors, syncope, numbness and headaches.   Hematological:  Negative for adenopathy.   Psychiatric/Behavioral:  Negative for agitation, confusion and sleep disturbance. The patient is not nervous/anxious.          Objective   /86   Pulse 79   Ht 5' 9\" (1.753 m)   Wt 95.7 kg (211 lb)   SpO2 99%   BMI 31.16 kg/m²     Physical " Exam  Constitutional:       Appearance: He is well-developed.   HENT:      Head: Normocephalic.      Right Ear: External ear normal.      Left Ear: External ear normal.      Nose: Nose normal.   Eyes:      Extraocular Movements: Extraocular movements intact.      Conjunctiva/sclera: Conjunctivae normal.      Pupils: Pupils are equal, round, and reactive to light.   Neck:      Thyroid: No thyromegaly.   Cardiovascular:      Rate and Rhythm: Normal rate and regular rhythm.      Heart sounds: Normal heart sounds.   Pulmonary:      Effort: Pulmonary effort is normal.      Breath sounds: Normal breath sounds.   Abdominal:      General: Bowel sounds are normal.      Palpations: Abdomen is soft.   Musculoskeletal:         General: Normal range of motion.      Cervical back: Normal range of motion.   Skin:     General: Skin is warm and dry.   Neurological:      Mental Status: He is alert and oriented to person, place, and time.   Psychiatric:         Mood and Affect: Mood normal.         Behavior: Behavior normal.

## 2025-02-03 NOTE — PATIENT INSTRUCTIONS
"Patient Education     Routine physical for adults   The Basics   Written by the doctors and editors at [facility]   What is a physical? -- A physical is a routine visit, or \"check-up,\" with your doctor. You might also hear it called a \"wellness visit\" or \"preventive visit.\"  During each visit, the doctor will:   Ask about your physical and mental health   Ask about your habits, behaviors, and lifestyle   Do an exam   Give you vaccines if needed   Talk to you about any medicines you take   Give advice about your health   Answer your questions  Getting regular check-ups is an important part of taking care of your health. It can help your doctor find and treat any problems you have. But it's also important for preventing health problems.  A routine physical is different from a \"sick visit.\" A sick visit is when you see a doctor because of a health concern or problem. Since physicals are scheduled ahead of time, you can think about what you want to ask the doctor.  How often should I get a physical? -- It depends on your age and health. In general, for people age 21 years and older:   If you are younger than 50 years, you might be able to get a physical every 3 years.   If you are 50 years or older, your doctor might recommend a physical every year.  If you have an ongoing health condition, like diabetes or high blood pressure, your doctor will probably want to see you more often.  What happens during a physical? -- In general, each visit will include:   Physical exam - The doctor or nurse will check your height, weight, heart rate, and blood pressure. They will also look at your eyes and ears. They will ask about how you are feeling and whether you have any symptoms that bother you.   Medicines - It's a good idea to bring a list of all the medicines you take to each doctor visit. Your doctor will talk to you about your medicines and answer any questions. Tell them if you are having any side effects that bother you. You " "should also tell them if you are having trouble paying for any of your medicines.   Habits and behaviors - This includes:   Your diet   Your exercise habits   Whether you smoke, drink alcohol, or use drugs   Whether you are sexually active   Whether you feel safe at home  Your doctor will talk to you about things you can do to improve your health and lower your risk of health problems. They will also offer help and support. For example, if you want to quit smoking, they can give you advice and might prescribe medicines. If you want to improve your diet or get more physical activity, they can help you with this, too.   Lab tests, if needed - The tests you get will depend on your age and situation. For example, your doctor might want to check your:   Cholesterol   Blood sugar   Iron level   Vaccines - The recommended vaccines will depend on your age, health, and what vaccines you already had. Vaccines are very important because they can prevent certain serious or deadly infections.   Discussion of screening - \"Screening\" means checking for diseases or other health problems before they cause symptoms. Your doctor can recommend screening based on your age, risk, and preferences. This might include tests to check for:   Cancer, such as breast, prostate, cervical, ovarian, colorectal, prostate, lung, or skin cancer   Sexually transmitted infections, such as chlamydia and gonorrhea   Mental health conditions like depression and anxiety  Your doctor will talk to you about the different types of screening tests. They can help you decide which screenings to have. They can also explain what the results might mean.   Answering questions - The physical is a good time to ask the doctor or nurse questions about your health. If needed, they can refer you to other doctors or specialists, too.  Adults older than 65 years often need other care, too. As you get older, your doctor will talk to you about:   How to prevent falling at " home   Hearing or vision tests   Memory testing   How to take your medicines safely   Making sure that you have the help and support you need at home  All topics are updated as new evidence becomes available and our peer review process is complete.  This topic retrieved from [employer] on: May 02, 2024.  Topic 558507 Version 1.0  Release: 32.4.3 - C32.122  © 2024 UpToDate, Inc. and/or its affiliates. All rights reserved.  Consumer Information Use and Disclaimer   Disclaimer: This generalized information is a limited summary of diagnosis, treatment, and/or medication information. It is not meant to be comprehensive and should be used as a tool to help the user understand and/or assess potential diagnostic and treatment options. It does NOT include all information about conditions, treatments, medications, side effects, or risks that may apply to a specific patient. It is not intended to be medical advice or a substitute for the medical advice, diagnosis, or treatment of a health care provider based on the health care provider's examination and assessment of a patient's specific and unique circumstances. Patients must speak with a health care provider for complete information about their health, medical questions, and treatment options, including any risks or benefits regarding use of medications. This information does not endorse any treatments or medications as safe, effective, or approved for treating a specific patient. UpToDate, Inc. and its affiliates disclaim any warranty or liability relating to this information or the use thereof.The use of this information is governed by the Terms of Use, available at https://www.woltersVidSchooluwer.com/en/know/clinical-effectiveness-terms. 2024© UpToDate, Inc. and its affiliates and/or licensors. All rights reserved.  Copyright   © 2024 UpToDate, Inc. and/or its affiliates. All rights reserved.

## 2025-02-04 ENCOUNTER — TELEPHONE (OUTPATIENT)
Dept: FAMILY MEDICINE CLINIC | Facility: CLINIC | Age: 55
End: 2025-02-04

## 2025-02-06 NOTE — TELEPHONE ENCOUNTER
PA for Mounjaro) 2.5 MG/0.5ML SOAJ SUBMITTED to     via    [x][facility]-KEY: [id number]  []Surescripts-Case ID #   []Availity-Auth ID # NDC #   []Faxed to plan   []Other website   []Phone call Case ID #     [x]PA sent as URGENT    All office notes, labs and other pertaining documents and studies sent. Clinical questions answered. Awaiting determination from insurance company.     Turnaround time for your insurance to make a decision on your Prior Authorization can take 7-21 business days.

## 2025-02-06 NOTE — TELEPHONE ENCOUNTER
PA for Mounjaro 2.5 mg  DENIED    Reason:(Screenshot if applicable)        Message sent to office clinical pool Yes    Denial letter scanned into Media Yes    Appeal started No (Provider will need to decide if appeal is warranted and send clinical documentation to Prior Authorization Team for initiation.)    **Please follow up with your patient regarding denial and next steps**

## 2025-02-07 DIAGNOSIS — E11.65 TYPE 2 DIABETES MELLITUS WITH HYPERGLYCEMIA, WITHOUT LONG-TERM CURRENT USE OF INSULIN (HCC): Primary | ICD-10-CM

## 2025-02-13 NOTE — TELEPHONE ENCOUNTER
Pt called, picked up the Ozempic. Pt states on 3/1 he will be getting new insurance through work, does not have the info yet. Not sure if the refill next month will effect this or not when insurance changes. When he gets the info, he will reach out to us.

## 2025-03-08 DIAGNOSIS — E11.65 TYPE 2 DIABETES MELLITUS WITH HYPERGLYCEMIA, WITHOUT LONG-TERM CURRENT USE OF INSULIN (HCC): ICD-10-CM

## 2025-03-08 RX ORDER — SEMAGLUTIDE 0.68 MG/ML
INJECTION, SOLUTION SUBCUTANEOUS
Qty: 3 ML | Refills: 0 | Status: SHIPPED | OUTPATIENT
Start: 2025-03-08

## 2025-04-05 DIAGNOSIS — I10 ESSENTIAL HYPERTENSION: ICD-10-CM

## 2025-04-05 RX ORDER — AMLODIPINE BESYLATE 10 MG/1
10 TABLET ORAL DAILY
Qty: 90 TABLET | Refills: 1 | Status: SHIPPED | OUTPATIENT
Start: 2025-04-05

## 2025-04-20 DIAGNOSIS — E11.65 TYPE 2 DIABETES MELLITUS WITH HYPERGLYCEMIA, WITHOUT LONG-TERM CURRENT USE OF INSULIN (HCC): ICD-10-CM

## 2025-05-07 ENCOUNTER — TELEPHONE (OUTPATIENT)
Age: 55
End: 2025-05-07

## 2025-05-07 NOTE — TELEPHONE ENCOUNTER
Patient called back and stated he been on the ozempic for about 6 wweeks and about the last 2 weeks anything he eat doesn't stay with him and he has massive diarrhea. Patient would like to know if we could prescribe a similar drug but not that one. Thank you

## 2025-05-08 DIAGNOSIS — I10 ESSENTIAL HYPERTENSION: ICD-10-CM

## 2025-05-08 DIAGNOSIS — E78.2 MIXED HYPERLIPIDEMIA: ICD-10-CM

## 2025-05-08 RX ORDER — LISINOPRIL 10 MG/1
10 TABLET ORAL DAILY
Qty: 90 TABLET | Refills: 1 | Status: SHIPPED | OUTPATIENT
Start: 2025-05-08

## 2025-05-08 RX ORDER — ROSUVASTATIN CALCIUM 10 MG/1
10 TABLET, COATED ORAL DAILY
Qty: 30 TABLET | Refills: 0 | Status: SHIPPED | OUTPATIENT
Start: 2025-05-08

## 2025-05-08 NOTE — TELEPHONE ENCOUNTER
All of the medications in this class will do the same thing.  I want him to skip a week and try again and see if the symptoms return.

## 2025-05-08 NOTE — TELEPHONE ENCOUNTER
Patient needs updated blood work. Please place orders. A courtesy refill was provided.         Pt needs Lipids done

## 2025-06-06 ENCOUNTER — NURSE TRIAGE (OUTPATIENT)
Age: 55
End: 2025-06-06

## 2025-06-06 NOTE — TELEPHONE ENCOUNTER
"REASON FOR CONVERSATION: Medication Reaction    SYMPTOMS: mild dizziness when he bends forward, last for a few seconds only. Patient is denying other symptoms.    OTHER HEALTH INFORMATION: Patient states since starting ozempic he had a week of explosive diarrhea, and now that has subsided he's having mild dizzy spells when he leans forward, lasting for a few seconds.    PROTOCOL DISPOSITION: Home Care    CARE ADVICE PROVIDED: Nursing education on ozempic side effects were given to the patient. Patient advised to call us back if symptom worsens or he developed new ones.    PRACTICE FOLLOW-UP: Please call back patient with any further recommendations.      Reason for Disposition   Caller has medicine question only, adult not sick, and triager answers question    Answer Assessment - Initial Assessment Questions  1. NAME of MEDICINE: \"What medicine(s) are you calling about?\"      Ozempic    2. QUESTION: \"What is your question?\" (e.g., double dose of medicine, side effect)      Patient is experiencing some mild dizziness when he bends forward for a few seconds since starting the medication.    3. PRESCRIBER: \"Who prescribed the medicine?\" Reason: if prescribed by specialist, call should be referred to that group.      Dr Андрей Gage   4. SYMPTOMS: \"Do you have any symptoms?\" If Yes, ask: \"What symptoms are you having?\"  \"How bad are the symptoms (e.g., mild, moderate, severe)      Mild dizziness that occurs when bending for a few seconds    Protocols used: Medication Question Call-Adult-[place]    "

## 2025-06-07 DIAGNOSIS — E78.2 MIXED HYPERLIPIDEMIA: ICD-10-CM

## 2025-06-09 RX ORDER — ROSUVASTATIN CALCIUM 10 MG/1
10 TABLET, COATED ORAL DAILY
Qty: 90 TABLET | Refills: 1 | Status: SHIPPED | OUTPATIENT
Start: 2025-06-09

## 2025-07-10 DIAGNOSIS — F41.9 ANXIETY: ICD-10-CM

## 2025-07-10 RX ORDER — VENLAFAXINE HYDROCHLORIDE 150 MG/1
150 CAPSULE, EXTENDED RELEASE ORAL DAILY
Qty: 90 CAPSULE | Refills: 1 | Status: SHIPPED | OUTPATIENT
Start: 2025-07-10

## 2025-07-27 ENCOUNTER — HOSPITAL ENCOUNTER (EMERGENCY)
Facility: HOSPITAL | Age: 55
Discharge: HOME | End: 2025-07-27
Attending: EMERGENCY MEDICINE | Admitting: EMERGENCY MEDICINE
Payer: COMMERCIAL

## 2025-07-27 ENCOUNTER — NURSE TRIAGE (OUTPATIENT)
Dept: OTHER | Facility: OTHER | Age: 55
End: 2025-07-27

## 2025-07-27 ENCOUNTER — APPOINTMENT (EMERGENCY)
Dept: CT IMAGING | Facility: HOSPITAL | Age: 55
End: 2025-07-27
Payer: COMMERCIAL

## 2025-07-27 VITALS
WEIGHT: 203.93 LBS | HEART RATE: 98 BPM | SYSTOLIC BLOOD PRESSURE: 145 MMHG | TEMPERATURE: 98.4 F | DIASTOLIC BLOOD PRESSURE: 93 MMHG | BODY MASS INDEX: 29.19 KG/M2 | RESPIRATION RATE: 18 BRPM | OXYGEN SATURATION: 98 % | HEIGHT: 70 IN

## 2025-07-27 DIAGNOSIS — K57.92 DIVERTICULITIS: Primary | ICD-10-CM

## 2025-07-27 LAB
ALBUMIN SERPL BCG-MCNC: 4.2 G/DL (ref 3.5–5)
ALP SERPL-CCNC: 62 U/L (ref 34–104)
ALT SERPL W P-5'-P-CCNC: 19 U/L (ref 7–52)
ANION GAP SERPL CALCULATED.3IONS-SCNC: 6 MMOL/L (ref 4–13)
AST SERPL W P-5'-P-CCNC: 15 U/L (ref 13–39)
BASOPHILS # BLD AUTO: 0.02 THOUSANDS/ÂΜL (ref 0–0.1)
BASOPHILS NFR BLD AUTO: 0 % (ref 0–1)
BILIRUB DIRECT SERPL-MCNC: 0.11 MG/DL (ref 0–0.2)
BILIRUB SERPL-MCNC: 0.72 MG/DL (ref 0.2–1)
BUN SERPL-MCNC: 16 MG/DL (ref 5–25)
CALCIUM SERPL-MCNC: 9.4 MG/DL (ref 8.4–10.2)
CHLORIDE SERPL-SCNC: 103 MMOL/L (ref 96–108)
CO2 SERPL-SCNC: 29 MMOL/L (ref 21–32)
CREAT SERPL-MCNC: 1.12 MG/DL (ref 0.6–1.3)
EOSINOPHIL # BLD AUTO: 0.13 THOUSAND/ÂΜL (ref 0–0.61)
EOSINOPHIL NFR BLD AUTO: 1 % (ref 0–6)
ERYTHROCYTE [DISTWIDTH] IN BLOOD BY AUTOMATED COUNT: 12.9 % (ref 11.6–15.1)
GFR SERPL CREATININE-BSD FRML MDRD: 72 ML/MIN/1.73SQ M
GLUCOSE SERPL-MCNC: 114 MG/DL (ref 65–140)
HCT VFR BLD AUTO: 37.3 % (ref 36.5–49.3)
HGB BLD-MCNC: 12.6 G/DL (ref 12–17)
IMM GRANULOCYTES # BLD AUTO: 0.03 THOUSAND/UL (ref 0–0.2)
IMM GRANULOCYTES NFR BLD AUTO: 0 % (ref 0–2)
LACTATE SERPL-SCNC: 1.5 MMOL/L (ref 0.5–2)
LYMPHOCYTES # BLD AUTO: 1.54 THOUSANDS/ÂΜL (ref 0.6–4.47)
LYMPHOCYTES NFR BLD AUTO: 15 % (ref 14–44)
MCH RBC QN AUTO: 27.8 PG (ref 26.8–34.3)
MCHC RBC AUTO-ENTMCNC: 33.8 G/DL (ref 31.4–37.4)
MCV RBC AUTO: 82 FL (ref 82–98)
MONOCYTES # BLD AUTO: 0.91 THOUSAND/ÂΜL (ref 0.17–1.22)
MONOCYTES NFR BLD AUTO: 9 % (ref 4–12)
NEUTROPHILS # BLD AUTO: 7.81 THOUSANDS/ÂΜL (ref 1.85–7.62)
NEUTS SEG NFR BLD AUTO: 75 % (ref 43–75)
NRBC BLD AUTO-RTO: 0 /100 WBCS
PLATELET # BLD AUTO: 250 THOUSANDS/UL (ref 149–390)
PMV BLD AUTO: 9.2 FL (ref 8.9–12.7)
POTASSIUM SERPL-SCNC: 3.9 MMOL/L (ref 3.5–5.3)
PROT SERPL-MCNC: 7 G/DL (ref 6.4–8.4)
RBC # BLD AUTO: 4.53 MILLION/UL (ref 3.88–5.62)
SODIUM SERPL-SCNC: 138 MMOL/L (ref 135–147)
WBC # BLD AUTO: 10.44 THOUSAND/UL (ref 4.31–10.16)

## 2025-07-27 PROCEDURE — 87040 BLOOD CULTURE FOR BACTERIA: CPT | Performed by: EMERGENCY MEDICINE

## 2025-07-27 PROCEDURE — 99284 EMERGENCY DEPT VISIT MOD MDM: CPT

## 2025-07-27 PROCEDURE — 99284 EMERGENCY DEPT VISIT MOD MDM: CPT | Performed by: EMERGENCY MEDICINE

## 2025-07-27 PROCEDURE — 80076 HEPATIC FUNCTION PANEL: CPT | Performed by: EMERGENCY MEDICINE

## 2025-07-27 PROCEDURE — 85025 COMPLETE CBC W/AUTO DIFF WBC: CPT | Performed by: EMERGENCY MEDICINE

## 2025-07-27 PROCEDURE — 96365 THER/PROPH/DIAG IV INF INIT: CPT

## 2025-07-27 PROCEDURE — 74176 CT ABD & PELVIS W/O CONTRAST: CPT

## 2025-07-27 PROCEDURE — 83605 ASSAY OF LACTIC ACID: CPT | Performed by: EMERGENCY MEDICINE

## 2025-07-27 PROCEDURE — 80048 BASIC METABOLIC PNL TOTAL CA: CPT | Performed by: EMERGENCY MEDICINE

## 2025-07-27 PROCEDURE — 36415 COLL VENOUS BLD VENIPUNCTURE: CPT | Performed by: EMERGENCY MEDICINE

## 2025-07-27 RX ADMIN — PIPERACILLIN AND TAZOBACTAM 4.5 G: 36; 4.5 INJECTION, POWDER, FOR SOLUTION INTRAVENOUS at 20:07

## 2025-07-29 ENCOUNTER — TELEPHONE (OUTPATIENT)
Age: 55
End: 2025-07-29

## 2025-08-02 LAB
BACTERIA BLD CULT: NORMAL
BACTERIA BLD CULT: NORMAL

## 2025-08-04 ENCOUNTER — OFFICE VISIT (OUTPATIENT)
Dept: FAMILY MEDICINE CLINIC | Facility: CLINIC | Age: 55
End: 2025-08-04
Payer: COMMERCIAL

## 2025-08-04 VITALS
HEART RATE: 108 BPM | BODY MASS INDEX: 29.06 KG/M2 | SYSTOLIC BLOOD PRESSURE: 122 MMHG | HEIGHT: 70 IN | DIASTOLIC BLOOD PRESSURE: 72 MMHG | OXYGEN SATURATION: 98 % | WEIGHT: 203 LBS

## 2025-08-04 DIAGNOSIS — I10 ESSENTIAL HYPERTENSION: ICD-10-CM

## 2025-08-04 DIAGNOSIS — E11.65 TYPE 2 DIABETES MELLITUS WITH HYPERGLYCEMIA, WITHOUT LONG-TERM CURRENT USE OF INSULIN (HCC): Primary | ICD-10-CM

## 2025-08-04 DIAGNOSIS — K57.92 DIVERTICULITIS: ICD-10-CM

## 2025-08-04 DIAGNOSIS — E78.2 MIXED HYPERLIPIDEMIA: ICD-10-CM

## 2025-08-04 LAB — SL AMB POCT HEMOGLOBIN AIC: 6.5 (ref ?–6.5)

## 2025-08-04 PROCEDURE — 83036 HEMOGLOBIN GLYCOSYLATED A1C: CPT | Performed by: FAMILY MEDICINE

## 2025-08-04 PROCEDURE — 99214 OFFICE O/P EST MOD 30 MIN: CPT | Performed by: FAMILY MEDICINE

## 2025-08-05 ENCOUNTER — TELEPHONE (OUTPATIENT)
Dept: FAMILY MEDICINE CLINIC | Facility: CLINIC | Age: 55
End: 2025-08-05

## 2025-08-17 DIAGNOSIS — I10 ESSENTIAL HYPERTENSION: ICD-10-CM

## 2025-08-17 DIAGNOSIS — E11.65 TYPE 2 DIABETES MELLITUS WITH HYPERGLYCEMIA, WITHOUT LONG-TERM CURRENT USE OF INSULIN (HCC): ICD-10-CM

## 2025-08-19 RX ORDER — AMLODIPINE BESYLATE 10 MG/1
10 TABLET ORAL DAILY
Qty: 90 TABLET | Refills: 1 | Status: SHIPPED | OUTPATIENT
Start: 2025-08-19

## (undated) DEVICE — GAUZE SPONGES,8 PLY: Brand: CURITY

## (undated) DEVICE — U-DRAPE: Brand: CONVERTORS

## (undated) DEVICE — SHOULDER STABILIZATION KIT,                                    DISPOSABLE 12 PER BOX

## (undated) DEVICE — 3M™ TEGADERM™ TRANSPARENT FILM DRESSING FRAME STYLE, 1624W, 2-3/8 IN X 2-3/4 IN (6 CM X 7 CM), 100/CT 4CT/CASE: Brand: 3M™ TEGADERM™

## (undated) DEVICE — Device

## (undated) DEVICE — DRESSING MEPILEX AG BORDER 4 X 8 IN

## (undated) DEVICE — ABDOMINAL PAD: Brand: DERMACEA

## (undated) DEVICE — TUBING SUCTION 5MM X 12 FT

## (undated) DEVICE — ENDOPATH XCEL UNIVERSAL TROCAR STABLILITY SLEEVES: Brand: ENDOPATH XCEL

## (undated) DEVICE — SCD SEQUENTIAL COMPRESSION COMFORT SLEEVE MEDIUM KNEE LENGTH: Brand: KENDALL SCD

## (undated) DEVICE — SUT ETHILON 3-0 PS-1 18 IN 1663H

## (undated) DEVICE — BLADE SHAVER EXCALIBUR 4MM 13CM COOLCUT

## (undated) DEVICE — LIGHT HANDLE COVER SLEEVE DISP BLUE STELLAR

## (undated) DEVICE — PENCIL ELECTROSURG E-Z CLEAN -0035H

## (undated) DEVICE — GLOVE SRG BIOGEL 9

## (undated) DEVICE — NEEDLE 25G X 1 1/2

## (undated) DEVICE — GLOVE INDICATOR PI UNDERGLOVE SZ 7.5 BLUE

## (undated) DEVICE — SORBAFIX ABSORBABLE FIXATION SYSTEM 30 ABSORBABLE FASTENERS: Brand: SORBAFIX ABSORBABLE FIXATION SYSTEM

## (undated) DEVICE — ENDOPATH XCEL BLADELESS TROCARS WITH STABILITY SLEEVES: Brand: ENDOPATH XCEL

## (undated) DEVICE — INTENDED FOR TISSUE SEPARATION, AND OTHER PROCEDURES THAT REQUIRE A SHARP SURGICAL BLADE TO PUNCTURE OR CUT.: Brand: BARD-PARKER ® CARBON RIB-BACK BLADES

## (undated) DEVICE — INTENDED FOR TISSUE SEPARATION, AND OTHER PROCEDURES THAT REQUIRE A SHARP SURGICAL BLADE TO PUNCTURE OR CUT.: Brand: BARD-PARKER SAFETY BLADES SIZE 15, STERILE

## (undated) DEVICE — BETHLEHEM UNIVERSAL  ARTHRO PK: Brand: CARDINAL HEALTH

## (undated) DEVICE — GAUZE SPONGES,16 PLY: Brand: CURITY

## (undated) DEVICE — ARTHROSCOPY FLOOR MAT

## (undated) DEVICE — ELECTRODE LAP L WIRE E-Z CLEAN 33CM -0100

## (undated) DEVICE — DRAPE EQUIPMENT RF WAND

## (undated) DEVICE — REM POLYHESIVE ADULT PATIENT RETURN ELECTRODE: Brand: VALLEYLAB

## (undated) DEVICE — ENDOPATH PNEUMONEEDLE INSUFFLATION NEEDLES WITH LUER LOCK CONNECTORS 120MM: Brand: ENDOPATH

## (undated) DEVICE — IMPERVIOUS STOCKINETTE: Brand: DEROYAL

## (undated) DEVICE — NEEDLE SUT SCORPION MULTIFIRE

## (undated) DEVICE — GLOVE SRG BIOGEL ECLIPSE 7.5

## (undated) DEVICE — GLOVE INDICATOR PI UNDERGLOVE SZ 9 BLUE

## (undated) DEVICE — CLOSURE DEVICE ENDO CLOSE

## (undated) DEVICE — BLADE SHAVER TORPEDO 4MM 13CM  COOLCUT

## (undated) DEVICE — ASTOUND SURGICAL GOWN, XXX LARGE, X-LONG: Brand: CONVERTORS

## (undated) DEVICE — DRAPE SHEET THREE QUARTER

## (undated) DEVICE — MEDI-VAC YANKAUER SUCTION HANDLE W/STRAIGHT TIP & CONTROL VENT: Brand: CARDINAL HEALTH

## (undated) DEVICE — 3M™ STERI-STRIP™ REINFORCED ADHESIVE SKIN CLOSURES, R1546, 1/4 IN X 4 IN (6 MM X 100 MM), 10 STRIPS/ENVELOPE: Brand: 3M™ STERI-STRIP™

## (undated) DEVICE — SUT VICRYL 4-0 PS-2 27 IN J426H

## (undated) DEVICE — BLADE SHAVER CUTTER BN 4MM 13CM COOLCUT

## (undated) DEVICE — SUT NUROLON 0 CTX C551D

## (undated) DEVICE — TUBING ARTHROSCOPIC WAVE  MAIN PUMP

## (undated) DEVICE — CANNULA 7 X70MM THRD SEAL SIDE PORT

## (undated) DEVICE — CHLORAPREP HI-LITE 26ML ORANGE

## (undated) DEVICE — 3M™ IOBAN™ 2 ANTIMICROBIAL INCISE DRAPE 6640EZ: Brand: IOBAN™ 2

## (undated) DEVICE — ALLENTOWN LAP CHOLE APP PACK: Brand: CARDINAL HEALTH

## (undated) DEVICE — OCCLUSIVE GAUZE STRIP,3% BISMUTH TRIBROMOPHENATE IN PETROLATUM BLEND: Brand: XEROFORM

## (undated) DEVICE — BULB SYRINGE,IRRIGATION WITH PROTECTIVE CAP: Brand: DOVER